# Patient Record
Sex: FEMALE | Race: WHITE | NOT HISPANIC OR LATINO | Employment: OTHER | URBAN - METROPOLITAN AREA
[De-identification: names, ages, dates, MRNs, and addresses within clinical notes are randomized per-mention and may not be internally consistent; named-entity substitution may affect disease eponyms.]

---

## 2017-02-08 ENCOUNTER — HOSPITAL ENCOUNTER (OUTPATIENT)
Dept: MAMMOGRAPHY | Facility: HOSPITAL | Age: 67
Discharge: HOME | End: 2017-02-08
Attending: FAMILY MEDICINE

## 2017-02-08 DIAGNOSIS — Z12.31 VISIT FOR SCREENING MAMMOGRAM: ICD-10-CM

## 2017-06-05 ENCOUNTER — RECORDS - HEALTHEAST (OUTPATIENT)
Dept: LAB | Facility: CLINIC | Age: 67
End: 2017-06-05

## 2017-06-05 LAB
CHOLEST SERPL-MCNC: 156 MG/DL
FASTING STATUS PATIENT QL REPORTED: YES
HDLC SERPL-MCNC: 38 MG/DL
LDLC SERPL CALC-MCNC: 76 MG/DL
TRIGL SERPL-MCNC: 210 MG/DL

## 2018-02-13 ENCOUNTER — RECORDS - HEALTHEAST (OUTPATIENT)
Dept: ADMINISTRATIVE | Facility: OTHER | Age: 68
End: 2018-02-13

## 2018-02-14 ENCOUNTER — HOSPITAL ENCOUNTER (OUTPATIENT)
Dept: MAMMOGRAPHY | Facility: HOSPITAL | Age: 68
Discharge: HOME | End: 2018-02-14
Attending: FAMILY MEDICINE

## 2018-02-14 DIAGNOSIS — Z12.31 VISIT FOR SCREENING MAMMOGRAM: ICD-10-CM

## 2018-02-21 ENCOUNTER — OFFICE VISIT - HEALTHEAST (OUTPATIENT)
Dept: SURGERY | Facility: CLINIC | Age: 68
End: 2018-02-21

## 2018-02-21 DIAGNOSIS — K43.9 VENTRAL HERNIA WITHOUT OBSTRUCTION OR GANGRENE: ICD-10-CM

## 2018-02-21 DIAGNOSIS — D25.9 UTERINE LEIOMYOMA, UNSPECIFIED LOCATION: ICD-10-CM

## 2018-02-21 RX ORDER — LISINOPRIL 10 MG/1
10 TABLET ORAL DAILY
Status: SHIPPED | COMMUNITY
Start: 2017-12-26 | End: 2024-05-23

## 2018-02-21 RX ORDER — BETAMETHASONE VALERATE 1.2 MG/G
1 CREAM TOPICAL 2 TIMES DAILY PRN
Status: SHIPPED | COMMUNITY
Start: 2017-12-26

## 2018-02-21 RX ORDER — PRAVASTATIN SODIUM 40 MG
40 TABLET ORAL AT BEDTIME
Status: SHIPPED | COMMUNITY
Start: 2024-05-12

## 2018-02-21 RX ORDER — OMEPRAZOLE 40 MG/1
40 CAPSULE, DELAYED RELEASE ORAL DAILY
Status: SHIPPED | COMMUNITY
Start: 2017-12-26

## 2018-02-21 RX ORDER — LEVOTHYROXINE SODIUM 150 UG/1
137 TABLET ORAL SEE ADMIN INSTRUCTIONS
Status: SHIPPED | COMMUNITY
Start: 2017-12-26 | End: 2024-05-15

## 2018-02-21 ASSESSMENT — MIFFLIN-ST. JEOR: SCORE: 1159.54

## 2018-03-09 ASSESSMENT — MIFFLIN-ST. JEOR: SCORE: 1167.92

## 2018-03-12 ENCOUNTER — ANESTHESIA - HEALTHEAST (OUTPATIENT)
Dept: SURGERY | Facility: HOSPITAL | Age: 68
End: 2018-03-12

## 2018-03-13 ENCOUNTER — SURGERY - HEALTHEAST (OUTPATIENT)
Dept: SURGERY | Facility: HOSPITAL | Age: 68
End: 2018-03-13

## 2018-03-13 ASSESSMENT — MIFFLIN-ST. JEOR: SCORE: 1155.16

## 2018-03-14 ASSESSMENT — MIFFLIN-ST. JEOR
SCORE: 1148.42
SCORE: 1154.31

## 2018-03-21 ENCOUNTER — OFFICE VISIT - HEALTHEAST (OUTPATIENT)
Dept: SURGERY | Facility: CLINIC | Age: 68
End: 2018-03-21

## 2018-03-21 DIAGNOSIS — K43.9 HERNIA, VENTRAL: ICD-10-CM

## 2018-06-08 ENCOUNTER — RECORDS - HEALTHEAST (OUTPATIENT)
Dept: LAB | Facility: CLINIC | Age: 68
End: 2018-06-08

## 2018-06-08 LAB
ALBUMIN SERPL-MCNC: 3.9 G/DL (ref 3.5–5)
ALP SERPL-CCNC: 96 U/L (ref 45–120)
ALT SERPL W P-5'-P-CCNC: 13 U/L (ref 0–45)
ANION GAP SERPL CALCULATED.3IONS-SCNC: 10 MMOL/L (ref 5–18)
AST SERPL W P-5'-P-CCNC: 16 U/L (ref 0–40)
BILIRUB SERPL-MCNC: 0.5 MG/DL (ref 0–1)
BUN SERPL-MCNC: 20 MG/DL (ref 8–22)
CALCIUM SERPL-MCNC: 9.7 MG/DL (ref 8.5–10.5)
CHLORIDE BLD-SCNC: 110 MMOL/L (ref 98–107)
CHOLEST SERPL-MCNC: 157 MG/DL
CO2 SERPL-SCNC: 21 MMOL/L (ref 22–31)
CREAT SERPL-MCNC: 0.82 MG/DL (ref 0.6–1.1)
FASTING STATUS PATIENT QL REPORTED: ABNORMAL
GFR SERPL CREATININE-BSD FRML MDRD: >60 ML/MIN/1.73M2
GLUCOSE BLD-MCNC: 94 MG/DL (ref 70–125)
HDLC SERPL-MCNC: 41 MG/DL
LDLC SERPL CALC-MCNC: 90 MG/DL
POTASSIUM BLD-SCNC: 4.4 MMOL/L (ref 3.5–5)
PROT SERPL-MCNC: 6.6 G/DL (ref 6–8)
SODIUM SERPL-SCNC: 141 MMOL/L (ref 136–145)
TRIGL SERPL-MCNC: 130 MG/DL
TSH SERPL DL<=0.005 MIU/L-ACNC: 0.18 UIU/ML (ref 0.3–5)

## 2018-10-30 ENCOUNTER — RECORDS - HEALTHEAST (OUTPATIENT)
Dept: LAB | Facility: CLINIC | Age: 68
End: 2018-10-30

## 2018-10-30 LAB
ANION GAP SERPL CALCULATED.3IONS-SCNC: 10 MMOL/L (ref 5–18)
BUN SERPL-MCNC: 19 MG/DL (ref 8–22)
CALCIUM SERPL-MCNC: 10 MG/DL (ref 8.5–10.5)
CHLORIDE BLD-SCNC: 108 MMOL/L (ref 98–107)
CO2 SERPL-SCNC: 22 MMOL/L (ref 22–31)
CREAT SERPL-MCNC: 0.86 MG/DL (ref 0.6–1.1)
ERYTHROCYTE [DISTWIDTH] IN BLOOD BY AUTOMATED COUNT: 13.1 % (ref 11–14.5)
GFR SERPL CREATININE-BSD FRML MDRD: >60 ML/MIN/1.73M2
GLUCOSE BLD-MCNC: 94 MG/DL (ref 70–125)
HCT VFR BLD AUTO: 40.4 % (ref 35–47)
HGB BLD-MCNC: 12.9 G/DL (ref 12–16)
MCH RBC QN AUTO: 28.2 PG (ref 27–34)
MCHC RBC AUTO-ENTMCNC: 31.9 G/DL (ref 32–36)
MCV RBC AUTO: 88 FL (ref 80–100)
PLATELET # BLD AUTO: 281 THOU/UL (ref 140–440)
PMV BLD AUTO: 10 FL (ref 8.5–12.5)
POTASSIUM BLD-SCNC: 4.5 MMOL/L (ref 3.5–5)
RBC # BLD AUTO: 4.57 MILL/UL (ref 3.8–5.4)
SODIUM SERPL-SCNC: 140 MMOL/L (ref 136–145)
WBC: 9.6 THOU/UL (ref 4–11)

## 2019-02-06 ENCOUNTER — OFFICE VISIT - HEALTHEAST (OUTPATIENT)
Dept: SURGERY | Facility: CLINIC | Age: 69
End: 2019-02-06

## 2019-02-06 DIAGNOSIS — R10.84 ABDOMINAL PAIN, GENERALIZED: ICD-10-CM

## 2019-02-12 ENCOUNTER — RECORDS - HEALTHEAST (OUTPATIENT)
Dept: LAB | Facility: CLINIC | Age: 69
End: 2019-02-12

## 2019-02-12 LAB
TSH SERPL DL<=0.005 MIU/L-ACNC: 0.07 UIU/ML (ref 0.3–5)
VIT B12 SERPL-MCNC: 968 PG/ML (ref 213–816)

## 2019-02-22 ENCOUNTER — HOSPITAL ENCOUNTER (OUTPATIENT)
Dept: MAMMOGRAPHY | Facility: CLINIC | Age: 69
Discharge: HOME | End: 2019-02-22
Attending: FAMILY MEDICINE

## 2019-02-22 DIAGNOSIS — Z12.31 VISIT FOR SCREENING MAMMOGRAM: ICD-10-CM

## 2019-04-19 ENCOUNTER — RECORDS - HEALTHEAST (OUTPATIENT)
Dept: LAB | Facility: CLINIC | Age: 69
End: 2019-04-19

## 2019-04-19 LAB
ANION GAP SERPL CALCULATED.3IONS-SCNC: 9 MMOL/L (ref 5–18)
BUN SERPL-MCNC: 18 MG/DL (ref 8–22)
CALCIUM SERPL-MCNC: 10.1 MG/DL (ref 8.5–10.5)
CHLORIDE BLD-SCNC: 107 MMOL/L (ref 98–107)
CO2 SERPL-SCNC: 26 MMOL/L (ref 22–31)
CREAT SERPL-MCNC: 0.95 MG/DL (ref 0.6–1.1)
ERYTHROCYTE [DISTWIDTH] IN BLOOD BY AUTOMATED COUNT: 13.6 % (ref 11–14.5)
GFR SERPL CREATININE-BSD FRML MDRD: 58 ML/MIN/1.73M2
GLUCOSE BLD-MCNC: 93 MG/DL (ref 70–125)
HCT VFR BLD AUTO: 41.5 % (ref 35–47)
HGB BLD-MCNC: 13.2 G/DL (ref 12–16)
MCH RBC QN AUTO: 27.8 PG (ref 27–34)
MCHC RBC AUTO-ENTMCNC: 31.8 G/DL (ref 32–36)
MCV RBC AUTO: 88 FL (ref 80–100)
PLATELET # BLD AUTO: 280 THOU/UL (ref 140–440)
PMV BLD AUTO: 9.9 FL (ref 8.5–12.5)
POTASSIUM BLD-SCNC: 4.9 MMOL/L (ref 3.5–5)
RBC # BLD AUTO: 4.74 MILL/UL (ref 3.8–5.4)
SODIUM SERPL-SCNC: 142 MMOL/L (ref 136–145)
WBC: 6.4 THOU/UL (ref 4–11)

## 2019-05-29 ENCOUNTER — RECORDS - HEALTHEAST (OUTPATIENT)
Dept: LAB | Facility: CLINIC | Age: 69
End: 2019-05-29

## 2019-05-31 LAB — BACTERIA SPEC CULT: NORMAL

## 2019-08-09 ENCOUNTER — RECORDS - HEALTHEAST (OUTPATIENT)
Dept: LAB | Facility: CLINIC | Age: 69
End: 2019-08-09

## 2019-08-09 LAB
CHOLEST SERPL-MCNC: 161 MG/DL
FASTING STATUS PATIENT QL REPORTED: NORMAL
HDLC SERPL-MCNC: 50 MG/DL
LDLC SERPL CALC-MCNC: 83 MG/DL
TRIGL SERPL-MCNC: 140 MG/DL
TSH SERPL DL<=0.005 MIU/L-ACNC: 0.16 UIU/ML (ref 0.3–5)

## 2019-08-12 LAB — 25(OH)D3 SERPL-MCNC: 56.8 NG/ML (ref 30–80)

## 2020-03-13 ENCOUNTER — HOSPITAL ENCOUNTER (OUTPATIENT)
Dept: MAMMOGRAPHY | Facility: CLINIC | Age: 70
Discharge: HOME | End: 2020-03-13
Attending: FAMILY MEDICINE

## 2020-03-13 DIAGNOSIS — Z12.31 VISIT FOR SCREENING MAMMOGRAM: ICD-10-CM

## 2020-03-16 ENCOUNTER — RECORDS - HEALTHEAST (OUTPATIENT)
Dept: ADMINISTRATIVE | Facility: OTHER | Age: 70
End: 2020-03-16

## 2020-03-20 ENCOUNTER — HOSPITAL ENCOUNTER (OUTPATIENT)
Dept: MAMMOGRAPHY | Facility: CLINIC | Age: 70
Discharge: HOME | End: 2020-03-20
Attending: FAMILY MEDICINE

## 2020-03-20 DIAGNOSIS — N64.89 BREAST ASYMMETRY: ICD-10-CM

## 2020-11-06 ENCOUNTER — RECORDS - HEALTHEAST (OUTPATIENT)
Dept: LAB | Facility: CLINIC | Age: 70
End: 2020-11-06

## 2020-11-06 LAB
ALBUMIN SERPL-MCNC: 3.9 G/DL (ref 3.5–5)
ALP SERPL-CCNC: 94 U/L (ref 45–120)
ALT SERPL W P-5'-P-CCNC: 22 U/L (ref 0–45)
ANION GAP SERPL CALCULATED.3IONS-SCNC: 11 MMOL/L (ref 5–18)
AST SERPL W P-5'-P-CCNC: 22 U/L (ref 0–40)
BILIRUB SERPL-MCNC: 0.4 MG/DL (ref 0–1)
BUN SERPL-MCNC: 22 MG/DL (ref 8–28)
CALCIUM SERPL-MCNC: 9.2 MG/DL (ref 8.5–10.5)
CHLORIDE BLD-SCNC: 108 MMOL/L (ref 98–107)
CHOLEST SERPL-MCNC: 173 MG/DL
CO2 SERPL-SCNC: 21 MMOL/L (ref 22–31)
CREAT SERPL-MCNC: 0.98 MG/DL (ref 0.6–1.1)
FASTING STATUS PATIENT QL REPORTED: ABNORMAL
GFR SERPL CREATININE-BSD FRML MDRD: 56 ML/MIN/1.73M2
GLUCOSE BLD-MCNC: 86 MG/DL (ref 70–125)
HDLC SERPL-MCNC: 46 MG/DL
LDLC SERPL CALC-MCNC: 97 MG/DL
POTASSIUM BLD-SCNC: 4.7 MMOL/L (ref 3.5–5)
PROT SERPL-MCNC: 6.4 G/DL (ref 6–8)
SODIUM SERPL-SCNC: 140 MMOL/L (ref 136–145)
TRIGL SERPL-MCNC: 148 MG/DL
TSH SERPL DL<=0.005 MIU/L-ACNC: 0.09 UIU/ML (ref 0.3–5)

## 2020-11-09 LAB — 25(OH)D3 SERPL-MCNC: 40.3 NG/ML (ref 30–80)

## 2020-12-28 ENCOUNTER — RECORDS - HEALTHEAST (OUTPATIENT)
Dept: LAB | Facility: CLINIC | Age: 70
End: 2020-12-28

## 2020-12-29 LAB — BACTERIA SPEC CULT: NO GROWTH

## 2021-05-06 ENCOUNTER — RECORDS - HEALTHEAST (OUTPATIENT)
Dept: LAB | Facility: CLINIC | Age: 71
End: 2021-05-06

## 2021-05-06 LAB — TSH SERPL DL<=0.005 MIU/L-ACNC: 0.35 UIU/ML (ref 0.3–5)

## 2021-05-07 ENCOUNTER — HOSPITAL ENCOUNTER (OUTPATIENT)
Dept: MAMMOGRAPHY | Facility: CLINIC | Age: 71
Discharge: HOME | End: 2021-05-07
Attending: FAMILY MEDICINE

## 2021-05-07 DIAGNOSIS — Z12.31 VISIT FOR SCREENING MAMMOGRAM: ICD-10-CM

## 2021-05-24 ENCOUNTER — RECORDS - HEALTHEAST (OUTPATIENT)
Dept: ADMINISTRATIVE | Facility: CLINIC | Age: 71
End: 2021-05-24

## 2021-05-25 ENCOUNTER — RECORDS - HEALTHEAST (OUTPATIENT)
Dept: ADMINISTRATIVE | Facility: CLINIC | Age: 71
End: 2021-05-25

## 2021-05-26 ENCOUNTER — RECORDS - HEALTHEAST (OUTPATIENT)
Dept: ADMINISTRATIVE | Facility: CLINIC | Age: 71
End: 2021-05-26

## 2021-05-28 ENCOUNTER — RECORDS - HEALTHEAST (OUTPATIENT)
Dept: ADMINISTRATIVE | Facility: CLINIC | Age: 71
End: 2021-05-28

## 2021-05-29 ENCOUNTER — RECORDS - HEALTHEAST (OUTPATIENT)
Dept: ADMINISTRATIVE | Facility: CLINIC | Age: 71
End: 2021-05-29

## 2021-05-30 ENCOUNTER — RECORDS - HEALTHEAST (OUTPATIENT)
Dept: ADMINISTRATIVE | Facility: CLINIC | Age: 71
End: 2021-05-30

## 2021-06-01 ENCOUNTER — RECORDS - HEALTHEAST (OUTPATIENT)
Dept: ADMINISTRATIVE | Facility: CLINIC | Age: 71
End: 2021-06-01

## 2021-06-01 VITALS — HEIGHT: 62 IN | BODY MASS INDEX: 27.92 KG/M2 | WEIGHT: 151.7 LBS

## 2021-06-01 VITALS — WEIGHT: 155.9 LBS | BODY MASS INDEX: 29.43 KG/M2 | HEIGHT: 61 IN

## 2021-06-16 PROBLEM — D25.9 FIBROID UTERUS: Status: ACTIVE | Noted: 2018-03-13

## 2021-06-16 NOTE — PROGRESS NOTES
Hernia education & surgery packet provided to pt.    Nori Navas CMA ([facility])     Ph: [phone]    Fx: [phone]

## 2021-06-16 NOTE — PROGRESS NOTES
HPI: Pt is here for follow up of a CLAIRE with Ventral hernia repair..   she is doing well.  Pain is well controlled.  No difficulties with the surgical wound/wounds.  she is eating well and denies fever and chills.         /81 (Patient Site: Right Arm, Patient Position: Sitting, Cuff Size: Adult Regular)  Pulse 85  SpO2 96%  Breastfeeding? No    EXAM:  GENERAL:Appears well  ABDOMEN:  Soft, +BS  SURGICAL WOUNDS:  Incisions healing well, no enduration or drainage.  Staples were removed in clinic today.      Assessment/Plan:   Doing well after surgery.  Her staples were removed and should follow up as needed.      Josue Love MD  [facility] Department of Surgery

## 2021-06-16 NOTE — PROGRESS NOTES
"GENERAL SURGICAL CONSULTATION    I was requested by Dr. Zamarripa, to consult on this pt to evaluate them for recurrent ventral hernia    HPI:  This is a 68 y.o. female here today with recurrent ventral hernia.  This patient had an open cholecystectomy for which she developed a ventral hernia.  She has had at least 2 ventral hernia repairs done in the past.  The patient has now been assessed by Dr. Zamarripa for an expansive uterine fibroid.  Dr. Zamarripa is planning an open hysterectomy and has requested surgical input because the patient has evidence for a recurrent ventral hernia.  The patient's not critically symptomatic with the hernia defect.  She does have deformity of the abdominal wall.  She denies troubles with nausea vomiting or weight loss.    Allergies:Penicillins    History reviewed. No pertinent past medical history.    Past surgical history:  Open cholecystectomy  Ventral hernias ×2 the last one was laparoscopic with mesh.    CURRENT MEDS:  Current Outpatient Prescriptions   Medication Sig Dispense Refill     betamethasone valerate (VALISONE) 0.1 % cream        levothyroxine (SYNTHROID, LEVOTHROID) 150 MCG tablet        lisinopril (PRINIVIL,ZESTRIL) 10 MG tablet        omeprazole (PRILOSEC) 40 MG capsule        pravastatin (PRAVACHOL) 40 MG tablet        No current facility-administered medications for this visit.        Family History   Problem Relation Age of Onset     Cancer Father 76     brain     Family history is not pertinent to this patients Chief Complaint.     reports that she has quit smoking. She has never used smokeless tobacco. She reports that she drinks alcohol. She reports that she does not use illicit drugs.    Review of Systems -   10 point Review of systems is negative except for; as mentioned above in HPI and PMHx    BP (!) 152/95 (Patient Site: Right Arm, Patient Position: Sitting, Cuff Size: Adult Regular)  Pulse 89  Ht 5' 1\" (1.549 m)  Wt 155 lb 14.4 oz (70.7 kg)  SpO2 95%  " Breastfeeding? No  BMI 29.46 kg/m2  Body mass index is 29.46 kg/(m^2).    EXAM:  GENERAL: Well developed female  HEENT: EOMI, Anicteric Sclera, Moist Mucous Membranes,  In Mouth the pt does not have redness or bleeding gums  CARDIOVASCULAR: RRR w/out murmur   CHEST/LUNG: Clear to Auscultation  ABDOMEN:  Non tender to palpation, +BS, the abdominal wall is deformed she has protuberance of the left side of the abdominal wall.  She has a herniation which is protruding out in the left periumbilical region.  At least some of that hernia content is reducible.  This area is not particularly tender to palpation, however the patient does say she is uncomfortable as I try to reduce the hernia.  MUSCULOSKELETAL:  No deformities with good range of motion in all extremities  NEURO: She is ambulatory with good strength in both legs.  HEME/LYMPH: No Cervical Adenopathy or tenderness.     IMAGES:  I evaluated these images myself and it shows an enlarged uterus along with the ventral hernia.    Assessment/Plan:  68-year-old patient with large fibroid which will require an open hysterectomy in addition that the patient has a ventral hernia and has had prior hernia repairs.  I think it is appropriate the general surgery works as an assistant to help with a hysterectomy because the patient will likely have anterior abdominal wall adhesions which may need to be negotiated and exposing the uterus for the hysterectomy.  Following the hysterectomy will need to do some abdominal wall reconstruction to treat the ventral hernia.  If there is no contamination with the surgery will likely use mesh if there is any contamination we may just need to do a primary abdominal wall closure and deal with any hernia recurrence as if they come up.    Thank Dr. Zamarripa for this consultation I look forward to working with you in the care of this very pleasant patient      Josue Love MD  [facility]  [phone]

## 2021-06-16 NOTE — ANESTHESIA CARE TRANSFER NOTE
Last vitals:   Vitals:    03/13/18 1013   BP: 133/67   Pulse: 96   Resp: 20   Temp: 36.5  C (97.7  F)   SpO2: 96%     Patient's level of consciousness is awake  Spontaneous respirations: yes  Maintains airway independently: yes  Dentition unchanged: yes  Oropharynx: oropharynx clear of all foreign objects    QCDR Measures:  ASA# 20 - Surgical Safety Checklist: WHO surgical safety checklist completed prior to induction  PQRS# 430 - Adult PONV Prevention: 4558F - Pt received => 2 anti-emetic agents (different classes) preop & intraop  ASA# 8 - Peds PONV Prevention: NA - Not pediatric patient, not GA or 2 or more risk factors NOT present  PQRS# 424 - Dipika-op Temp Management: 4559F - At least one body temp DOCUMENTED => 35.5C or 95.9F within required timeframe  PQRS# 426 - PACU Transfer Protocol: - Transfer of care checklist used  ASA# 14 - Acute Post-op Pain: ASA14A - Patient experienced pain >= 7 out of 10   Transferred to pacu w O2 via mask 8 L/min. VSS

## 2021-06-16 NOTE — ANESTHESIA PREPROCEDURE EVALUATION
Anesthesia Evaluation      Patient summary reviewed   History of anesthetic complications (PONV)     Airway   Mallampati: II  Neck ROM: limited   Pulmonary - normal exam                          Cardiovascular - normal exam  (+) hypertension, ,     ECG reviewed  Rhythm: regular  Rate: normal,         Neuro/Psych    (+) depression,     Endo/Other    (+) hypothyroidism,      GI/Hepatic/Renal    (+) GERD,             Dental - normal exam                 Chemistry        Component Value Date/Time     06/05/2017 0845    K 4.3 06/05/2017 0845     (H) 06/05/2017 0845    CO2 22 06/05/2017 0845    BUN 15 06/05/2017 0845    CREATININE 0.86 06/05/2017 0845     06/05/2017 0845        Component Value Date/Time    CALCIUM 9.5 06/05/2017 0845    ALKPHOS 91 06/05/2017 0845    AST 17 06/05/2017 0845    ALT 20 06/05/2017 0845    BILITOT 0.4 06/05/2017 0845        Lab Results   Component Value Date    WBC 7.9 02/19/2016    HGB 14.1 03/12/2018    HCT 40.3 02/19/2016    MCV 88 02/19/2016     02/19/2016                Anesthesia Plan  Planned anesthetic: general endotracheal  Background diprivan    ASA 2   Induction: intravenous   Anesthetic plan and risks discussed with: patient  Anesthesia plan special considerations: antiemetics,   Post-op plan: routine recovery

## 2021-06-16 NOTE — ANESTHESIA POSTPROCEDURE EVALUATION
Patient: Mary Martino  EXPLORATORY LAPAROTOMY TOTAL ABDOMINAL HYSTERECTOMY, BILATERAL SALPINGO OOPHORECTOMY PELVIC WASHINGS LYSIS OF ADHESIONS, VENTRAL HERNIA REPAIR  Anesthesia type: general    Patient location: PACU  Last vitals:   Vitals:    03/13/18 1030   BP: 98/57   Pulse: 81   Resp: 13   Temp:    SpO2: 94%     Post vital signs: stable  Level of consciousness: awake and responds to simple questions  Post-anesthesia pain: pain controlled  Post-anesthesia nausea and vomiting: nausea being treated  Pulmonary: unassisted, return to baseline  Cardiovascular: stable and blood pressure at baseline  Hydration: adequate  Anesthetic events: no     QCDR Measures:  ASA# 11 - Dipika-op Cardiac Arrest: ASA11B - Patient did NOT experience unanticipated cardiac arrest  ASA# 12 - Dipika-op Mortality Rate: ASA12B - Patient did NOT die  ASA# 13 - PACU Re-Intubation Rate: ASA13B - Patient did NOT require a new airway mgmt  ASA# 10 - Composite Anes Safety: ASA10A - No serious adverse event    Additional Notes:

## 2021-07-13 ENCOUNTER — RECORDS - HEALTHEAST (OUTPATIENT)
Dept: ADMINISTRATIVE | Facility: CLINIC | Age: 71
End: 2021-07-13

## 2021-07-21 ENCOUNTER — RECORDS - HEALTHEAST (OUTPATIENT)
Dept: ADMINISTRATIVE | Facility: CLINIC | Age: 71
End: 2021-07-21

## 2022-02-07 ENCOUNTER — LAB REQUISITION (OUTPATIENT)
Dept: LAB | Facility: CLINIC | Age: 72
End: 2022-02-07
Payer: COMMERCIAL

## 2022-02-07 DIAGNOSIS — Z00.00 ENCOUNTER FOR GENERAL ADULT MEDICAL EXAMINATION WITHOUT ABNORMAL FINDINGS: ICD-10-CM

## 2022-02-07 DIAGNOSIS — E78.5 HYPERLIPIDEMIA, UNSPECIFIED: ICD-10-CM

## 2022-02-07 LAB
ALBUMIN SERPL-MCNC: 3.8 G/DL (ref 3.5–5)
ALP SERPL-CCNC: 85 U/L (ref 45–120)
ALT SERPL W P-5'-P-CCNC: 31 U/L (ref 0–45)
ANION GAP SERPL CALCULATED.3IONS-SCNC: 10 MMOL/L (ref 5–18)
AST SERPL W P-5'-P-CCNC: 28 U/L (ref 0–40)
BILIRUB SERPL-MCNC: 0.5 MG/DL (ref 0–1)
BUN SERPL-MCNC: 20 MG/DL (ref 8–28)
CALCIUM SERPL-MCNC: 9.6 MG/DL (ref 8.5–10.5)
CHLORIDE BLD-SCNC: 108 MMOL/L (ref 98–107)
CHOLEST SERPL-MCNC: 166 MG/DL
CO2 SERPL-SCNC: 23 MMOL/L (ref 22–31)
CREAT SERPL-MCNC: 1.1 MG/DL (ref 0.6–1.1)
FASTING STATUS PATIENT QL REPORTED: NORMAL
GFR SERPL CREATININE-BSD FRML MDRD: 53 ML/MIN/1.73M2
GLUCOSE BLD-MCNC: 92 MG/DL (ref 70–125)
HDLC SERPL-MCNC: 50 MG/DL
LDLC SERPL CALC-MCNC: 96 MG/DL
POTASSIUM BLD-SCNC: 4.6 MMOL/L (ref 3.5–5)
PROT SERPL-MCNC: 6.3 G/DL (ref 6–8)
SODIUM SERPL-SCNC: 141 MMOL/L (ref 136–145)
TRIGL SERPL-MCNC: 102 MG/DL

## 2022-02-07 PROCEDURE — 82306 VITAMIN D 25 HYDROXY: CPT | Mod: ORL | Performed by: FAMILY MEDICINE

## 2022-02-07 PROCEDURE — 80061 LIPID PANEL: CPT | Mod: ORL | Performed by: FAMILY MEDICINE

## 2022-02-07 PROCEDURE — 80053 COMPREHEN METABOLIC PANEL: CPT | Mod: ORL | Performed by: FAMILY MEDICINE

## 2022-02-08 LAB — DEPRECATED CALCIDIOL+CALCIFEROL SERPL-MC: 47 UG/L (ref 30–80)

## 2022-06-14 ENCOUNTER — LAB REQUISITION (OUTPATIENT)
Dept: LAB | Facility: CLINIC | Age: 72
End: 2022-06-14
Payer: COMMERCIAL

## 2022-06-14 DIAGNOSIS — E03.9 HYPOTHYROIDISM, UNSPECIFIED: ICD-10-CM

## 2022-06-14 DIAGNOSIS — R20.2 PARESTHESIA OF SKIN: ICD-10-CM

## 2022-06-14 LAB
TSH SERPL DL<=0.005 MIU/L-ACNC: 2.54 UIU/ML (ref 0.3–5)
VIT B12 SERPL-MCNC: 439 PG/ML (ref 213–816)

## 2022-06-14 PROCEDURE — 82607 VITAMIN B-12: CPT | Mod: ORL | Performed by: FAMILY MEDICINE

## 2022-06-14 PROCEDURE — 84443 ASSAY THYROID STIM HORMONE: CPT | Mod: ORL | Performed by: FAMILY MEDICINE

## 2022-07-06 ENCOUNTER — ANCILLARY PROCEDURE (OUTPATIENT)
Dept: MAMMOGRAPHY | Facility: CLINIC | Age: 72
End: 2022-07-06
Attending: FAMILY MEDICINE
Payer: COMMERCIAL

## 2022-07-06 DIAGNOSIS — Z12.31 VISIT FOR SCREENING MAMMOGRAM: ICD-10-CM

## 2022-07-06 PROCEDURE — 77067 SCR MAMMO BI INCL CAD: CPT

## 2022-12-05 ENCOUNTER — HOSPITAL ENCOUNTER (OUTPATIENT)
Facility: AMBULATORY SURGERY CENTER | Age: 72
End: 2022-12-05
Attending: COLON & RECTAL SURGERY
Payer: COMMERCIAL

## 2023-02-20 ENCOUNTER — LAB REQUISITION (OUTPATIENT)
Dept: LAB | Facility: CLINIC | Age: 73
End: 2023-02-20
Payer: COMMERCIAL

## 2023-02-20 DIAGNOSIS — N18.31 CHRONIC KIDNEY DISEASE, STAGE 3A (H): ICD-10-CM

## 2023-02-20 DIAGNOSIS — E78.5 HYPERLIPIDEMIA, UNSPECIFIED: ICD-10-CM

## 2023-02-20 DIAGNOSIS — E55.9 VITAMIN D DEFICIENCY, UNSPECIFIED: ICD-10-CM

## 2023-02-20 DIAGNOSIS — E53.8 DEFICIENCY OF OTHER SPECIFIED B GROUP VITAMINS: ICD-10-CM

## 2023-02-20 LAB
ALBUMIN SERPL BCG-MCNC: 4 G/DL (ref 3.5–5.2)
ALP SERPL-CCNC: 83 U/L (ref 35–104)
ALT SERPL W P-5'-P-CCNC: 18 U/L (ref 10–35)
ANION GAP SERPL CALCULATED.3IONS-SCNC: 12 MMOL/L (ref 7–15)
AST SERPL W P-5'-P-CCNC: 22 U/L (ref 10–35)
BILIRUB SERPL-MCNC: 0.4 MG/DL
BUN SERPL-MCNC: 16 MG/DL (ref 8–23)
CALCIUM SERPL-MCNC: 9.4 MG/DL (ref 8.8–10.2)
CHLORIDE SERPL-SCNC: 108 MMOL/L (ref 98–107)
CHOLEST SERPL-MCNC: 165 MG/DL
CREAT SERPL-MCNC: 0.99 MG/DL (ref 0.51–0.95)
DEPRECATED CALCIDIOL+CALCIFEROL SERPL-MC: 51 UG/L (ref 20–75)
DEPRECATED HCO3 PLAS-SCNC: 23 MMOL/L (ref 22–29)
GFR SERPL CREATININE-BSD FRML MDRD: 60 ML/MIN/1.73M2
GLUCOSE SERPL-MCNC: 90 MG/DL (ref 70–99)
HDLC SERPL-MCNC: 47 MG/DL
LDLC SERPL CALC-MCNC: 90 MG/DL
NONHDLC SERPL-MCNC: 118 MG/DL
POTASSIUM SERPL-SCNC: 4.1 MMOL/L (ref 3.4–5.3)
PROT SERPL-MCNC: 6.1 G/DL (ref 6.4–8.3)
SODIUM SERPL-SCNC: 143 MMOL/L (ref 136–145)
TRIGL SERPL-MCNC: 138 MG/DL
VIT B12 SERPL-MCNC: 571 PG/ML (ref 232–1245)

## 2023-02-20 PROCEDURE — 80053 COMPREHEN METABOLIC PANEL: CPT | Mod: ORL | Performed by: FAMILY MEDICINE

## 2023-02-20 PROCEDURE — 82306 VITAMIN D 25 HYDROXY: CPT | Mod: ORL | Performed by: FAMILY MEDICINE

## 2023-02-20 PROCEDURE — 80061 LIPID PANEL: CPT | Mod: ORL | Performed by: FAMILY MEDICINE

## 2023-02-20 PROCEDURE — 82607 VITAMIN B-12: CPT | Mod: ORL | Performed by: FAMILY MEDICINE

## 2023-06-16 ENCOUNTER — HOSPITAL ENCOUNTER (EMERGENCY)
Facility: CLINIC | Age: 73
Discharge: HOME | End: 2023-06-16
Attending: FAMILY MEDICINE | Admitting: FAMILY MEDICINE
Payer: COMMERCIAL

## 2023-06-16 ENCOUNTER — APPOINTMENT (OUTPATIENT)
Dept: CT IMAGING | Facility: CLINIC | Age: 73
End: 2023-06-16
Attending: FAMILY MEDICINE
Payer: COMMERCIAL

## 2023-06-16 VITALS
HEART RATE: 80 BPM | BODY MASS INDEX: 27.56 KG/M2 | TEMPERATURE: 97.2 F | SYSTOLIC BLOOD PRESSURE: 123 MMHG | OXYGEN SATURATION: 95 % | WEIGHT: 146 LBS | HEIGHT: 61 IN | RESPIRATION RATE: 18 BRPM | DIASTOLIC BLOOD PRESSURE: 69 MMHG

## 2023-06-16 DIAGNOSIS — K43.9 VENTRAL HERNIA WITHOUT OBSTRUCTION OR GANGRENE: ICD-10-CM

## 2023-06-16 DIAGNOSIS — R10.33 PERIUMBILICAL ABDOMINAL PAIN: ICD-10-CM

## 2023-06-16 DIAGNOSIS — K85.90 ACUTE PANCREATITIS WITHOUT INFECTION OR NECROSIS, UNSPECIFIED PANCREATITIS TYPE: ICD-10-CM

## 2023-06-16 LAB
ALBUMIN SERPL BCG-MCNC: 3.8 G/DL (ref 3.5–5.2)
ALP SERPL-CCNC: 84 U/L (ref 35–104)
ALT SERPL W P-5'-P-CCNC: 16 U/L (ref 0–50)
ANION GAP SERPL CALCULATED.3IONS-SCNC: 14 MMOL/L (ref 7–15)
AST SERPL W P-5'-P-CCNC: 24 U/L (ref 0–45)
BASOPHILS # BLD AUTO: 0.1 10E3/UL (ref 0–0.2)
BASOPHILS NFR BLD AUTO: 1 %
BILIRUB SERPL-MCNC: 0.3 MG/DL
BUN SERPL-MCNC: 21.8 MG/DL (ref 8–23)
CALCIUM SERPL-MCNC: 10 MG/DL (ref 8.8–10.2)
CHLORIDE SERPL-SCNC: 94 MMOL/L (ref 98–107)
CREAT SERPL-MCNC: 1.24 MG/DL (ref 0.51–0.95)
DEPRECATED HCO3 PLAS-SCNC: 23 MMOL/L (ref 22–29)
EOSINOPHIL # BLD AUTO: 0.3 10E3/UL (ref 0–0.7)
EOSINOPHIL NFR BLD AUTO: 3 %
ERYTHROCYTE [DISTWIDTH] IN BLOOD BY AUTOMATED COUNT: 12.9 % (ref 10–15)
GFR SERPL CREATININE-BSD FRML MDRD: 46 ML/MIN/1.73M2
GLUCOSE SERPL-MCNC: 100 MG/DL (ref 70–99)
HCT VFR BLD AUTO: 37.1 % (ref 35–47)
HGB BLD-MCNC: 13 G/DL (ref 11.7–15.7)
IMM GRANULOCYTES # BLD: 0 10E3/UL
IMM GRANULOCYTES NFR BLD: 0 %
LIPASE SERPL-CCNC: 147 U/L (ref 13–60)
LYMPHOCYTES # BLD AUTO: 1.1 10E3/UL (ref 0.8–5.3)
LYMPHOCYTES NFR BLD AUTO: 14 %
MCH RBC QN AUTO: 28.8 PG (ref 26.5–33)
MCHC RBC AUTO-ENTMCNC: 35 G/DL (ref 31.5–36.5)
MCV RBC AUTO: 82 FL (ref 78–100)
MONOCYTES # BLD AUTO: 0.8 10E3/UL (ref 0–1.3)
MONOCYTES NFR BLD AUTO: 11 %
NEUTROPHILS # BLD AUTO: 5.5 10E3/UL (ref 1.6–8.3)
NEUTROPHILS NFR BLD AUTO: 71 %
NRBC # BLD AUTO: 0 10E3/UL
NRBC BLD AUTO-RTO: 0 /100
PLATELET # BLD AUTO: 274 10E3/UL (ref 150–450)
POTASSIUM SERPL-SCNC: 3.6 MMOL/L (ref 3.4–5.3)
PROT SERPL-MCNC: 6.5 G/DL (ref 6.4–8.3)
RBC # BLD AUTO: 4.51 10E6/UL (ref 3.8–5.2)
SODIUM SERPL-SCNC: 131 MMOL/L (ref 136–145)
WBC # BLD AUTO: 7.7 10E3/UL (ref 4–11)

## 2023-06-16 PROCEDURE — 74177 CT ABD & PELVIS W/CONTRAST: CPT

## 2023-06-16 PROCEDURE — 258N000003 HC RX IP 258 OP 636: Performed by: FAMILY MEDICINE

## 2023-06-16 PROCEDURE — 96360 HYDRATION IV INFUSION INIT: CPT | Mod: 59 | Performed by: FAMILY MEDICINE

## 2023-06-16 PROCEDURE — 99285 EMERGENCY DEPT VISIT HI MDM: CPT | Mod: 25 | Performed by: FAMILY MEDICINE

## 2023-06-16 PROCEDURE — 85025 COMPLETE CBC W/AUTO DIFF WBC: CPT | Performed by: FAMILY MEDICINE

## 2023-06-16 PROCEDURE — 99284 EMERGENCY DEPT VISIT MOD MDM: CPT | Performed by: FAMILY MEDICINE

## 2023-06-16 PROCEDURE — 96361 HYDRATE IV INFUSION ADD-ON: CPT | Performed by: FAMILY MEDICINE

## 2023-06-16 PROCEDURE — 83690 ASSAY OF LIPASE: CPT | Performed by: FAMILY MEDICINE

## 2023-06-16 PROCEDURE — 250N000011 HC RX IP 250 OP 636: Performed by: FAMILY MEDICINE

## 2023-06-16 PROCEDURE — 250N000009 HC RX 250: Performed by: FAMILY MEDICINE

## 2023-06-16 PROCEDURE — 36415 COLL VENOUS BLD VENIPUNCTURE: CPT | Performed by: FAMILY MEDICINE

## 2023-06-16 PROCEDURE — 80053 COMPREHEN METABOLIC PANEL: CPT | Performed by: FAMILY MEDICINE

## 2023-06-16 RX ORDER — IOPAMIDOL 755 MG/ML
64 INJECTION, SOLUTION INTRAVASCULAR ONCE
Status: COMPLETED | OUTPATIENT
Start: 2023-06-16 | End: 2023-06-16

## 2023-06-16 RX ADMIN — SODIUM CHLORIDE 1000 ML: 9 INJECTION, SOLUTION INTRAVENOUS at 13:27

## 2023-06-16 RX ADMIN — SODIUM CHLORIDE 58 ML: 9 INJECTION, SOLUTION INTRAVENOUS at 12:58

## 2023-06-16 RX ADMIN — IOPAMIDOL 64 ML: 755 INJECTION, SOLUTION INTRAVENOUS at 12:59

## 2023-06-16 ASSESSMENT — ACTIVITIES OF DAILY LIVING (ADL)
ADLS_ACUITY_SCORE: 35
ADLS_ACUITY_SCORE: 35

## 2023-06-16 NOTE — ED PROVIDER NOTES
History     Chief Complaint   Patient presents with     Abdominal Pain     Pt reports she had a colonoscopy on last Wednesday, pt reports periumbilical pain since. Pt reports they had a difficult time with the colonoscopy due to scar tissue and pt needs to have further testing done. Pt reports hx of hernia repairs in the past. Pt reports pain 7/10, pt denies nausea, vomiting and fevers at this time.      HPI  Mary Martino is a 73 year old female, past medical history is significant for uterine fibroids, ventral hernia, depression, GERD, hyperlipidemia, hypertension, hypothyroidism, seasonal allergies, presents to the emergency department with concerns of abdominal pain, acute on chronic, per the patient.  The patient states that she always has some discomfort around the site where she has had ventral hernia repair x4 the last in 2018, but since a failed attempt at colonoscopy with [facility] in [place] on 6/14/2023 she has had increased discomfort.  The patient states that she had planned colonoscopy and had done preparation for it but they were not able to get in very far due to stricture apparently of some type.  She has been able to eat and drink and reports no nausea since that time but has had persistent pain that is probably a little bit worse when she is upright or walking in the supraumbilical area where she had ventral hernia repair.  She notes no fever chills or sweats.  She really has not had much for rectal output since the time of colonoscopy due to the colonic prep.  She contacted her gastroenterologist and was directed to our emergency department.      Allergies:  Allergies   Allergen Reactions     Codeine Nausea and Vomiting     Penicillins Hives     Occurred during childhood, Other reaction(s): *Unknown - Childhood Rxn, tolerates cephalosporins w/o problems       Problem List:    Patient Active Problem List    Diagnosis Date Noted     Fibroid uterus 03/13/2018     Priority: Medium     Recurrent  ventral hernia with incarceration      Priority: Medium        Past Medical History:    Past Medical History:   Diagnosis Date     Depression      GERD (gastroesophageal reflux disease)      Hyperlipidemia      Hypertension      Hypothyroidism      Leiomyoma of uterus      PONV (postoperative nausea and vomiting)      Seasonal allergies      Ventral hernia        Past Surgical History:    Past Surgical History:   Procedure Laterality Date     APPENDECTOMY       CHOLECYSTECTOMY       COLON SURGERY  2009    partial colon resection     EYE SURGERY      for proptosis     HEMIARTHROPLASTY SHOULDER FRACTURE Right      HERNIA REPAIR       HERNIORRHAPHY VENTRAL N/A 3/13/2018    Procedure: VENTRAL HERNIA REPAIR;  Surgeon: Josue Love MD;  Location: [facility] [place];  Service:      HUMERUS FRACTURE SURGERY       HYSTERECTOMY Bilateral 3/13/2018    Procedure: EXPLORATORY LAPAROTOMY TOTAL ABDOMINAL HYSTERECTOMY, BILATERAL SALPINGO OOPHORECTOMY PELVIC WASHINGS LYSIS OF ADHESIONS;  Surgeon: Sheri Zamarripa MD;  Location: [facility] [place];  Service:      THYROID SURGERY      ablation     TUBAL LIGATION         Family History:    Family History   Problem Relation Age of Onset     Cancer Father 76.00        brain     Breast Cancer No family hx of        Social History:  Marital Status:   [2]  Social History     Tobacco Use     Smoking status: Former     Types: Cigarettes     Quit date: 2000     Years since quittin.4     Smokeless tobacco: Never   Substance Use Topics     Alcohol use: Yes     Comment: Alcoholic Drinks/day: 1 drink a month      Drug use: No        Medications:    aspirin 81 MG EC tablet  betamethasone valerate (VALISONE) 0.1 % cream  calcium carbonate (OS-VADIM) 600 mg calcium (1,500 mg) tablet  cholecalciferol, vitamin D3, 1,000 unit tablet  levothyroxine (SYNTHROID, LEVOTHROID) 150 MCG tablet  lisinopril (PRINIVIL,ZESTRIL) 10 MG tablet  naproxen sodium (ALEVE) 220 MG  "tablet  OMEGA-3/DHA/EPA/FISH OIL (FISH OIL-OMEGA-3 FATTY ACIDS) 300-1,000 mg capsule  omeprazole (PRILOSEC) 40 MG capsule  peg 400-propylene glycol (SYSTANE, PROPYLENE GLYCOL,) 0.4-0.3 % Drop  pravastatin (PRAVACHOL) 40 MG tablet          Review of Systems   All other systems reviewed and are negative.      Physical Exam   BP: 134/80  Pulse: 78  Temp: 97.2  F (36.2  C)  Resp: 18  Height: 154.9 cm (5' 1\")  Weight: 66.2 kg (146 lb)  SpO2: 96 %      Physical Exam  Vitals and nursing note reviewed.   Constitutional:       General: She is not in acute distress.     Appearance: She is well-developed and normal weight. She is not ill-appearing.   HENT:      Head: Normocephalic and atraumatic.      Mouth/Throat:      Mouth: Mucous membranes are moist.      Pharynx: Oropharynx is clear.   Eyes:      Extraocular Movements: Extraocular movements intact.      Pupils: Pupils are equal, round, and reactive to light.   Cardiovascular:      Rate and Rhythm: Normal rate and regular rhythm.      Heart sounds: Normal heart sounds.   Pulmonary:      Effort: Pulmonary effort is normal.      Breath sounds: Normal breath sounds.   Abdominal:      Comments: Soft bowel sounds present minimal tenderness supraumbilical midline area in the distribution of previous ventral hernia surgery.  Scar midline.  No rebound or guarding.  No referred pain no CVA tenderness.   Skin:     General: Skin is warm and dry.      Capillary Refill: Capillary refill takes less than 2 seconds.   Neurological:      General: No focal deficit present.      Mental Status: She is alert.   Psychiatric:         Mood and Affect: Mood normal.         Behavior: Behavior normal.         ED Course                 Procedures                Results for orders placed or performed during the hospital encounter of 06/16/23 (from the past 24 hour(s))   CBC with platelets, differential    Narrative    The following orders were created for panel order CBC with platelets, " differential.  Procedure                               Abnormality         Status                     ---------                               -----------         ------                     CBC with platelets and d...[[id number]]                      Final result                 Please view results for these tests on the individual orders.   Comprehensive metabolic panel   Result Value Ref Range    Sodium 131 (L) 136 - 145 mmol/L    Potassium 3.6 3.4 - 5.3 mmol/L    Chloride 94 (L) 98 - 107 mmol/L    Carbon Dioxide (CO2) 23 22 - 29 mmol/L    Anion Gap 14 7 - 15 mmol/L    Urea Nitrogen 21.8 8.0 - 23.0 mg/dL    Creatinine 1.24 (H) 0.51 - 0.95 mg/dL    Calcium 10.0 8.8 - 10.2 mg/dL    Glucose 100 (H) 70 - 99 mg/dL    Alkaline Phosphatase 84 35 - 104 U/L    AST 24 0 - 45 U/L    ALT 16 0 - 50 U/L    Protein Total 6.5 6.4 - 8.3 g/dL    Albumin 3.8 3.5 - 5.2 g/dL    Bilirubin Total 0.3 <=1.2 mg/dL    GFR Estimate 46 (L) >60 mL/min/1.73m2   Lipase   Result Value Ref Range    Lipase 147 (H) 13 - 60 U/L   CBC with platelets and differential   Result Value Ref Range    WBC Count 7.7 4.0 - 11.0 10e3/uL    RBC Count 4.51 3.80 - 5.20 10e6/uL    Hemoglobin 13.0 11.7 - 15.7 g/dL    Hematocrit 37.1 35.0 - 47.0 %    MCV 82 78 - 100 fL    MCH 28.8 26.5 - 33.0 pg    MCHC 35.0 31.5 - 36.5 g/dL    RDW 12.9 10.0 - 15.0 %    Platelet Count 274 150 - 450 10e3/uL    % Neutrophils 71 %    % Lymphocytes 14 %    % Monocytes 11 %    % Eosinophils 3 %    % Basophils 1 %    % Immature Granulocytes 0 %    NRBCs per 100 WBC 0 <1 /100    Absolute Neutrophils 5.5 1.6 - 8.3 10e3/uL    Absolute Lymphocytes 1.1 0.8 - 5.3 10e3/uL    Absolute Monocytes 0.8 0.0 - 1.3 10e3/uL    Absolute Eosinophils 0.3 0.0 - 0.7 10e3/uL    Absolute Basophils 0.1 0.0 - 0.2 10e3/uL    Absolute Immature Granulocytes 0.0 <=0.4 10e3/uL    Absolute NRBCs 0.0 10e3/uL   CT Abdomen Pelvis w Contrast    Narrative    CT ABDOMEN PELVIS W CONTRAST 6/16/2023 1:12 PM    CLINICAL HISTORY:  Central abdominal pain with history of ventral  hernia repair x4    TECHNIQUE: CT scan of the abdomen and pelvis was performed following  injection of IV contrast. Multiplanar reformats were obtained. Dose  reduction techniques were used.  CONTRAST: 64 mL Isovue-370    COMPARISON: None.    FINDINGS:   LOWER CHEST: Unremarkable.    HEPATOBILIARY: Cholecystectomy. No evidence of biliary obstruction.    PANCREAS: Normal.    SPLEEN: Normal.    ADRENAL GLANDS: Normal.    KIDNEYS/BLADDER: Bilateral simple-appearing cysts, no specific  follow-up recommended. No hydronephrosis. Urinary bladder is partially  obscured but otherwise unremarkable.    BOWEL: Likely prior right hemicolectomy. Diverticulosis in the colon.  No acute inflammatory change. No obstruction.     PELVIC ORGANS: Hysterectomy.    ADDITIONAL FINDINGS: Likely prior ventral hernia repair with mesh with  inferior diastases recti and superimposed fat containing hernia,  aperture measuring 1.7 cm (series 3 image 114). No internal structures  or fat stranding to indicate acute complication.    MUSCULOSKELETAL: Bilateral hip arthroplasties with significant  resultant artifact pelvis. No acute bony abnormality. Mild lumbar  scoliosis.      Impression    IMPRESSION:   1.  Prior ventral hernia repair with small inferior fat-containing  ventral hernia as above without evidence of acute complication.  2.  No additional visualized explanation for patient's symptoms.    MADELEINE WOODS MD         SYSTEM ID:  [id number]       Medications   0.9% sodium chloride BOLUS (1,000 mLs Intravenous $New Bag 6/16/23 1327)   iopamidol (ISOVUE-370) solution 64 mL (64 mLs Intravenous $Given 6/16/23 1259)   sodium chloride 0.9 % bag 500mL for CT scan flush use (58 mLs Intravenous $Given 6/16/23 1258)   3:20 PM  Reviewed results in the room with the patient who is currently comfortable and minimal discomfort in the supraumbilical area identified previously.  I suspect that the bump in her  lipase relates to the recent colonoscopy prep, it may be the reason for her pain however her CT abdomen shows a normal-appearing pancreas at this time.  Her labs are otherwise reassuring with no white count, vitally stable and within adult normals.  I suspect that this represents some ongoing chronic irritation relating to her previous ventral hernia repairs as well as possibly a small component of mild pancreatitis.  We discussed options and at this point the patient would like to go home which is excepted and provided that she follows a bland diet and clear fluids until symptoms have fully resolved.  If this is not the case if she is worsening or not improving she will return to the emergency department at any time.      Assessments & Plan (with Medical Decision Making)   Assessment and plan with medical decision making at the time stamp above.      Disclaimer: This note consists of symbols derived from keyboarding, dictation and/or voice recognition software. As a result, there may be errors in the script that have gone undetected. Please consider this when interpreting information found in this chart.      I have reviewed the nursing notes.    I have reviewed the findings, diagnosis, plan and need for follow up with the patient.        New Prescriptions    No medications on file       Final diagnoses:   Periumbilical abdominal pain   Acute pancreatitis without infection or necrosis, unspecified pancreatitis type   Ventral hernia without obstruction or gangrene       6/16/2023   [facility] EMERGENCY DEPT     Johny Segura MD  06/16/23 6647

## 2023-06-16 NOTE — DISCHARGE INSTRUCTIONS
[place] diet and clear fluids as we discussed until clearly improving.  Tylenol as needed for pain  Return if worse or changes.

## 2023-06-16 NOTE — ED TRIAGE NOTES
Pt reports she had a colonoscopy on last Wednesday, pt reports periumbilical pain since. Pt reports they had a difficult time with the colonoscopy due to scar tissue and pt needs to have further testing done. Pt reports hx of hernia repairs in the past. Pt reports pain 7/10, pt denies nausea, vomiting and fevers at this time.

## 2023-08-07 ENCOUNTER — LAB REQUISITION (OUTPATIENT)
Dept: LAB | Facility: CLINIC | Age: 73
End: 2023-08-07
Payer: COMMERCIAL

## 2023-08-07 DIAGNOSIS — Z87.19 PERSONAL HISTORY OF OTHER DISEASES OF THE DIGESTIVE SYSTEM: ICD-10-CM

## 2023-08-07 PROCEDURE — 80053 COMPREHEN METABOLIC PANEL: CPT | Mod: ORL | Performed by: PHYSICIAN ASSISTANT

## 2023-08-07 PROCEDURE — 83690 ASSAY OF LIPASE: CPT | Mod: ORL | Performed by: PHYSICIAN ASSISTANT

## 2023-08-08 LAB
ALBUMIN SERPL BCG-MCNC: 4.2 G/DL (ref 3.5–5.2)
ALP SERPL-CCNC: 88 U/L (ref 35–104)
ALT SERPL W P-5'-P-CCNC: 23 U/L (ref 0–50)
ANION GAP SERPL CALCULATED.3IONS-SCNC: 11 MMOL/L (ref 7–15)
AST SERPL W P-5'-P-CCNC: 23 U/L (ref 0–45)
BILIRUB SERPL-MCNC: 0.2 MG/DL
BUN SERPL-MCNC: 23.3 MG/DL (ref 8–23)
CALCIUM SERPL-MCNC: 9.7 MG/DL (ref 8.8–10.2)
CHLORIDE SERPL-SCNC: 108 MMOL/L (ref 98–107)
CREAT SERPL-MCNC: 1.15 MG/DL (ref 0.51–0.95)
DEPRECATED HCO3 PLAS-SCNC: 23 MMOL/L (ref 22–29)
GFR SERPL CREATININE-BSD FRML MDRD: 50 ML/MIN/1.73M2
GLUCOSE SERPL-MCNC: 102 MG/DL (ref 70–99)
LIPASE SERPL-CCNC: 110 U/L (ref 13–60)
POTASSIUM SERPL-SCNC: 4.1 MMOL/L (ref 3.4–5.3)
PROT SERPL-MCNC: 6.3 G/DL (ref 6.4–8.3)
SODIUM SERPL-SCNC: 142 MMOL/L (ref 136–145)

## 2023-08-21 ENCOUNTER — ANCILLARY PROCEDURE (OUTPATIENT)
Dept: MAMMOGRAPHY | Facility: CLINIC | Age: 73
End: 2023-08-21
Attending: FAMILY MEDICINE
Payer: COMMERCIAL

## 2023-08-21 DIAGNOSIS — Z12.31 VISIT FOR SCREENING MAMMOGRAM: ICD-10-CM

## 2023-08-21 PROCEDURE — 77067 SCR MAMMO BI INCL CAD: CPT

## 2023-12-11 ENCOUNTER — LAB REQUISITION (OUTPATIENT)
Dept: LAB | Facility: CLINIC | Age: 73
End: 2023-12-11
Payer: COMMERCIAL

## 2023-12-11 DIAGNOSIS — R06.02 SHORTNESS OF BREATH: ICD-10-CM

## 2023-12-11 DIAGNOSIS — R74.8 ABNORMAL LEVELS OF OTHER SERUM ENZYMES: ICD-10-CM

## 2023-12-11 PROCEDURE — 82150 ASSAY OF AMYLASE: CPT | Mod: ORL | Performed by: FAMILY MEDICINE

## 2023-12-11 PROCEDURE — 83690 ASSAY OF LIPASE: CPT | Mod: ORL | Performed by: FAMILY MEDICINE

## 2023-12-11 PROCEDURE — 80053 COMPREHEN METABOLIC PANEL: CPT | Mod: ORL | Performed by: FAMILY MEDICINE

## 2023-12-11 PROCEDURE — 84145 PROCALCITONIN (PCT): CPT | Mod: ORL | Performed by: FAMILY MEDICINE

## 2023-12-12 LAB
ALBUMIN SERPL BCG-MCNC: 3.7 G/DL (ref 3.5–5.2)
ALP SERPL-CCNC: 78 U/L (ref 40–150)
ALT SERPL W P-5'-P-CCNC: 17 U/L (ref 0–50)
AMYLASE SERPL-CCNC: 63 U/L (ref 28–100)
ANION GAP SERPL CALCULATED.3IONS-SCNC: 9 MMOL/L (ref 7–15)
AST SERPL W P-5'-P-CCNC: 23 U/L (ref 0–45)
BILIRUB SERPL-MCNC: 0.5 MG/DL
BUN SERPL-MCNC: 13 MG/DL (ref 8–23)
CALCIUM SERPL-MCNC: 8.3 MG/DL (ref 8.8–10.2)
CHLORIDE SERPL-SCNC: 98 MMOL/L (ref 98–107)
CREAT SERPL-MCNC: 1.1 MG/DL (ref 0.51–0.95)
DEPRECATED HCO3 PLAS-SCNC: 34 MMOL/L (ref 22–29)
EGFRCR SERPLBLD CKD-EPI 2021: 53 ML/MIN/1.73M2
GLUCOSE SERPL-MCNC: 91 MG/DL (ref 70–99)
LIPASE SERPL-CCNC: 59 U/L (ref 13–60)
POTASSIUM SERPL-SCNC: 2.7 MMOL/L (ref 3.4–5.3)
PROCALCITONIN SERPL IA-MCNC: 0.07 NG/ML
PROT SERPL-MCNC: 6.3 G/DL (ref 6.4–8.3)
SODIUM SERPL-SCNC: 141 MMOL/L (ref 135–145)

## 2024-02-26 ENCOUNTER — LAB REQUISITION (OUTPATIENT)
Dept: LAB | Facility: CLINIC | Age: 74
End: 2024-02-26
Payer: COMMERCIAL

## 2024-02-26 ENCOUNTER — HOSPITAL ENCOUNTER (EMERGENCY)
Facility: CLINIC | Age: 74
Discharge: HOME | End: 2024-02-26
Attending: EMERGENCY MEDICINE | Admitting: EMERGENCY MEDICINE
Payer: COMMERCIAL

## 2024-02-26 VITALS
DIASTOLIC BLOOD PRESSURE: 83 MMHG | HEART RATE: 84 BPM | OXYGEN SATURATION: 92 % | TEMPERATURE: 97 F | HEIGHT: 61 IN | RESPIRATION RATE: 12 BRPM | SYSTOLIC BLOOD PRESSURE: 146 MMHG | WEIGHT: 140 LBS | BODY MASS INDEX: 26.43 KG/M2

## 2024-02-26 DIAGNOSIS — N18.31 CHRONIC KIDNEY DISEASE, STAGE 3A (H): ICD-10-CM

## 2024-02-26 DIAGNOSIS — R55 SYNCOPE, UNSPECIFIED SYNCOPE TYPE: ICD-10-CM

## 2024-02-26 DIAGNOSIS — E03.9 HYPOTHYROIDISM, UNSPECIFIED: ICD-10-CM

## 2024-02-26 DIAGNOSIS — I12.9 HYPERTENSIVE CHRONIC KIDNEY DISEASE WITH STAGE 1 THROUGH STAGE 4 CHRONIC KIDNEY DISEASE, OR UNSPECIFIED CHRONIC KIDNEY DISEASE: ICD-10-CM

## 2024-02-26 DIAGNOSIS — E78.5 HYPERLIPIDEMIA, UNSPECIFIED: ICD-10-CM

## 2024-02-26 LAB
ALBUMIN UR-MCNC: NEGATIVE MG/DL
ANION GAP SERPL CALCULATED.3IONS-SCNC: 10 MMOL/L (ref 7–15)
APPEARANCE UR: CLEAR
BASOPHILS # BLD AUTO: 0.1 10E3/UL (ref 0–0.2)
BASOPHILS NFR BLD AUTO: 1 %
BILIRUB UR QL STRIP: NEGATIVE
BUN SERPL-MCNC: 23.4 MG/DL (ref 8–23)
CALCIUM SERPL-MCNC: 9.7 MG/DL (ref 8.8–10.2)
CHLORIDE SERPL-SCNC: 104 MMOL/L (ref 98–107)
COLOR UR AUTO: YELLOW
CREAT SERPL-MCNC: 1.12 MG/DL (ref 0.51–0.95)
DEPRECATED HCO3 PLAS-SCNC: 24 MMOL/L (ref 22–29)
EGFRCR SERPLBLD CKD-EPI 2021: 51 ML/MIN/1.73M2
EOSINOPHIL # BLD AUTO: 0.2 10E3/UL (ref 0–0.7)
EOSINOPHIL NFR BLD AUTO: 3 %
ERYTHROCYTE [DISTWIDTH] IN BLOOD BY AUTOMATED COUNT: 13.9 % (ref 10–15)
ERYTHROCYTE [DISTWIDTH] IN BLOOD BY AUTOMATED COUNT: 14.4 % (ref 10–15)
GLUCOSE SERPL-MCNC: 168 MG/DL (ref 70–99)
GLUCOSE UR STRIP-MCNC: NEGATIVE MG/DL
HCT VFR BLD AUTO: 37 % (ref 35–47)
HCT VFR BLD AUTO: 40.7 % (ref 35–47)
HGB BLD-MCNC: 12.4 G/DL (ref 11.7–15.7)
HGB BLD-MCNC: 13.1 G/DL (ref 11.7–15.7)
HGB UR QL STRIP: NEGATIVE
HOLD SPECIMEN: NORMAL
IMM GRANULOCYTES # BLD: 0 10E3/UL
IMM GRANULOCYTES NFR BLD: 1 %
KETONES UR STRIP-MCNC: NEGATIVE MG/DL
LEUKOCYTE ESTERASE UR QL STRIP: NEGATIVE
LYMPHOCYTES # BLD AUTO: 1.2 10E3/UL (ref 0.8–5.3)
LYMPHOCYTES NFR BLD AUTO: 17 %
MCH RBC QN AUTO: 27.5 PG (ref 26.5–33)
MCH RBC QN AUTO: 28.2 PG (ref 26.5–33)
MCHC RBC AUTO-ENTMCNC: 32.2 G/DL (ref 31.5–36.5)
MCHC RBC AUTO-ENTMCNC: 33.5 G/DL (ref 31.5–36.5)
MCV RBC AUTO: 84 FL (ref 78–100)
MCV RBC AUTO: 86 FL (ref 78–100)
MONOCYTES # BLD AUTO: 0.6 10E3/UL (ref 0–1.3)
MONOCYTES NFR BLD AUTO: 9 %
NEUTROPHILS # BLD AUTO: 5 10E3/UL (ref 1.6–8.3)
NEUTROPHILS NFR BLD AUTO: 69 %
NITRATE UR QL: NEGATIVE
NRBC # BLD AUTO: 0 10E3/UL
NRBC BLD AUTO-RTO: 0 /100
PH UR STRIP: 7 [PH] (ref 5–7)
PLATELET # BLD AUTO: 246 10E3/UL (ref 150–450)
PLATELET # BLD AUTO: 274 10E3/UL (ref 150–450)
POTASSIUM SERPL-SCNC: 4.6 MMOL/L (ref 3.4–5.3)
RBC # BLD AUTO: 4.39 10E6/UL (ref 3.8–5.2)
RBC # BLD AUTO: 4.76 10E6/UL (ref 3.8–5.2)
RBC URINE: 2 /HPF
SODIUM SERPL-SCNC: 138 MMOL/L (ref 135–145)
SP GR UR STRIP: 1.01 (ref 1–1.03)
TROPONIN T SERPL HS-MCNC: 20 NG/L
TROPONIN T SERPL HS-MCNC: 20 NG/L
UROBILINOGEN UR STRIP-MCNC: NORMAL MG/DL
WBC # BLD AUTO: 6.8 10E3/UL (ref 4–11)
WBC # BLD AUTO: 7.1 10E3/UL (ref 4–11)
WBC URINE: 1 /HPF

## 2024-02-26 PROCEDURE — 85025 COMPLETE CBC W/AUTO DIFF WBC: CPT | Performed by: EMERGENCY MEDICINE

## 2024-02-26 PROCEDURE — 80053 COMPREHEN METABOLIC PANEL: CPT | Performed by: EMERGENCY MEDICINE

## 2024-02-26 PROCEDURE — 84484 ASSAY OF TROPONIN QUANT: CPT | Performed by: EMERGENCY MEDICINE

## 2024-02-26 PROCEDURE — 93005 ELECTROCARDIOGRAM TRACING: CPT | Performed by: EMERGENCY MEDICINE

## 2024-02-26 PROCEDURE — 80061 LIPID PANEL: CPT | Mod: ORL | Performed by: FAMILY MEDICINE

## 2024-02-26 PROCEDURE — 84443 ASSAY THYROID STIM HORMONE: CPT | Mod: ORL | Performed by: FAMILY MEDICINE

## 2024-02-26 PROCEDURE — 81001 URINALYSIS AUTO W/SCOPE: CPT | Performed by: EMERGENCY MEDICINE

## 2024-02-26 PROCEDURE — 82607 VITAMIN B-12: CPT | Mod: ORL | Performed by: FAMILY MEDICINE

## 2024-02-26 PROCEDURE — 36415 COLL VENOUS BLD VENIPUNCTURE: CPT | Performed by: EMERGENCY MEDICINE

## 2024-02-26 PROCEDURE — 82306 VITAMIN D 25 HYDROXY: CPT | Mod: ORL | Performed by: FAMILY MEDICINE

## 2024-02-26 PROCEDURE — 85027 COMPLETE CBC AUTOMATED: CPT | Mod: ORL | Performed by: FAMILY MEDICINE

## 2024-02-26 PROCEDURE — 99284 EMERGENCY DEPT VISIT MOD MDM: CPT | Mod: 25 | Performed by: EMERGENCY MEDICINE

## 2024-02-26 PROCEDURE — 80048 BASIC METABOLIC PNL TOTAL CA: CPT | Performed by: EMERGENCY MEDICINE

## 2024-02-26 PROCEDURE — 258N000003 HC RX IP 258 OP 636: Performed by: EMERGENCY MEDICINE

## 2024-02-26 PROCEDURE — 96360 HYDRATION IV INFUSION INIT: CPT | Performed by: EMERGENCY MEDICINE

## 2024-02-26 PROCEDURE — 93010 ELECTROCARDIOGRAM REPORT: CPT | Performed by: EMERGENCY MEDICINE

## 2024-02-26 RX ADMIN — SODIUM CHLORIDE, POTASSIUM CHLORIDE, SODIUM LACTATE AND CALCIUM CHLORIDE 500 ML: 600; 310; 30; 20 INJECTION, SOLUTION INTRAVENOUS at 13:51

## 2024-02-26 ASSESSMENT — ACTIVITIES OF DAILY LIVING (ADL)
ADLS_ACUITY_SCORE: 35

## 2024-02-26 ASSESSMENT — COLUMBIA-SUICIDE SEVERITY RATING SCALE - C-SSRS
2. HAVE YOU ACTUALLY HAD ANY THOUGHTS OF KILLING YOURSELF IN THE PAST MONTH?: NO
6. HAVE YOU EVER DONE ANYTHING, STARTED TO DO ANYTHING, OR PREPARED TO DO ANYTHING TO END YOUR LIFE?: NO
1. IN THE PAST MONTH, HAVE YOU WISHED YOU WERE DEAD OR WISHED YOU COULD GO TO SLEEP AND NOT WAKE UP?: NO

## 2024-02-26 NOTE — DISCHARGE INSTRUCTIONS
You were seen today after passing out during a blood draw. Things here look ok. In the future, try to eat food before you get blood drawn, unless the doctor says to fast. Even then, you can drink water beforehand.    Take care and feel better soon.

## 2024-02-26 NOTE — ED TRIAGE NOTES
Pt had syncopal episode while having blood drawn in the clinic.      Triage Assessment (Adult)       Row Name 02/26/24 1211          Triage Assessment    Airway WDL WDL        Cognitive/Neuro/Behavioral WDL    Cognitive/Neuro/Behavioral WDL WDL

## 2024-02-26 NOTE — ED PROVIDER NOTES
History     Chief Complaint   Patient presents with    Syncope     HPI  Mary Martino is a 74 year old female who presents with syncope.  The patient had a blood draw today.  She during the blood draw looked at the blood and felt lightheaded and her vision was going dark and she lost consciousness briefly.  She did not hit her head.  No cardiac history.  Denies any palpitation or chest pain prior to this.  Denies any recent illness or fevers.  She feels well at this time.  She was would like to go home if possible.  Fevers, chills, diarrhea.  She does relate that she did not eat breakfast today.  Arrives via EMS.  They provided additional history.    Allergies:  Allergies   Allergen Reactions    Codeine Nausea and Vomiting    Penicillins Hives     Occurred during childhood, Other reaction(s): *Unknown - Childhood Rxn, tolerates cephalosporins w/o problems       Problem List:    Patient Active Problem List    Diagnosis Date Noted    Fibroid uterus 03/13/2018     Priority: Medium    Recurrent ventral hernia with incarceration      Priority: Medium        Past Medical History:    Past Medical History:   Diagnosis Date    Depression     GERD (gastroesophageal reflux disease)     Hyperlipidemia     Hypertension     Hypothyroidism     Leiomyoma of uterus     PONV (postoperative nausea and vomiting)     Seasonal allergies     Ventral hernia        Past Surgical History:    Past Surgical History:   Procedure Laterality Date    APPENDECTOMY      CHOLECYSTECTOMY      COLON SURGERY  2009    partial colon resection    EYE SURGERY      for proptosis    HEMIARTHROPLASTY SHOULDER FRACTURE Right     HERNIA REPAIR      HERNIORRHAPHY VENTRAL N/A 3/13/2018    Procedure: VENTRAL HERNIA REPAIR;  Surgeon: Josue Love MD;  Location: [facility] [place];  Service:     HUMERUS FRACTURE SURGERY      HYSTERECTOMY Bilateral 3/13/2018    Procedure: EXPLORATORY LAPAROTOMY TOTAL ABDOMINAL HYSTERECTOMY, BILATERAL SALPINGO OOPHORECTOMY  "PELVIC WASHINGS LYSIS OF ADHESIONS;  Surgeon: Sheri Zamarripa MD;  Location: [facility] OR;  Service:     THYROID SURGERY      ablation    TUBAL LIGATION         Family History:    Family History   Problem Relation Age of Onset    Cancer Father 76.00        brain    Breast Cancer No family hx of        Social History:  Marital Status:   [2]  Social History     Tobacco Use    Smoking status: Former     Types: Cigarettes     Quit date: 2000     Years since quittin.1    Smokeless tobacco: Never   Substance Use Topics    Alcohol use: Yes     Comment: Alcoholic Drinks/day: 1 drink a month     Drug use: No        Medications:    aspirin 81 MG EC tablet  betamethasone valerate (VALISONE) 0.1 % cream  calcium carbonate (OS-VADIM) 600 mg calcium (1,500 mg) tablet  cholecalciferol, vitamin D3, 1,000 unit tablet  levothyroxine (SYNTHROID, LEVOTHROID) 150 MCG tablet  lisinopril (PRINIVIL,ZESTRIL) 10 MG tablet  naproxen sodium (ALEVE) 220 MG tablet  OMEGA-3/DHA/EPA/FISH OIL (FISH OIL-OMEGA-3 FATTY ACIDS) 300-1,000 mg capsule  omeprazole (PRILOSEC) 40 MG capsule  peg 400-propylene glycol (SYSTANE, PROPYLENE GLYCOL,) 0.4-0.3 % Drop  pravastatin (PRAVACHOL) 40 MG tablet          Review of Systems    Physical Exam   BP: (!) 145/95  Pulse: 69  Temp: 97  F (36.1  C)  Resp: 18  Height: 154.9 cm (5' 1\")  Weight: 63.5 kg (140 lb)  SpO2: 97 %      Physical Exam  Vitals reviewed.   Constitutional:       General: She is not in acute distress.     Appearance: She is not toxic-appearing.   HENT:      Head: Normocephalic.      Right Ear: External ear normal.      Left Ear: External ear normal.   Eyes:      General:         Right eye: No discharge.         Left eye: No discharge.   Cardiovascular:      Rate and Rhythm: Normal rate.      Pulses: Normal pulses.   Pulmonary:      Effort: No respiratory distress.      Breath sounds: No wheezing.   Abdominal:      General: There is no distension.      Tenderness: There is no " abdominal tenderness.   Musculoskeletal:         General: No swelling.   Skin:     Capillary Refill: Capillary refill takes less than 2 seconds.      Coloration: Skin is not jaundiced.   Neurological:      Mental Status: She is alert and oriented to person, place, and time.      Cranial Nerves: No cranial nerve deficit.   Psychiatric:      Comments: Very nice         ED Course     ED Course as of 02/26/24 1511   Mon Feb 26, 2024   1315 For unknown reasons, labs were drawn prior to my seeing the patient.  This shows her creatinine is basically at baseline but she does have a slight bump in her BUN.  She is not confused.  Her high-sensitivity troponin is 20 and I do not think that this was a cardiac event.   1505 Urine reassuring. Patient ate here. Wants to go home. Gave ER precautions. Will discharge at her request at this time.      Procedures              EKG Interpretation:      Interpreted by Forrest Brooke MD  Time reviewed: 114   Symptoms at time of EKG: none (had syncope)  Rhythm: normal sinus   Rate: normal  Axis: normal  Ectopy: none  Conduction: normal, no Brugada pattern, no delta wave, no epsilon wave, no hocm pattern, no prolonged qt  ST Segments/ T Waves: No ST-T wave changes  Q Waves: none  Comparison to prior: no ischemic changes from 2018    Clinical Impression: normal EKG      Results for orders placed or performed during the hospital encounter of 02/26/24 (from the past 24 hour(s))   [place] Draw    Narrative    The following orders were created for panel order [place] Draw.  Procedure                               Abnormality         Status                     ---------                               -----------         ------                     Extra Blue Top Tube[[id number]]                              Final result               Extra Red Top Tube[[id number]]                               Final result               Extra Green Top (Lithium...[[id number]]                      Final result                Extra Purple Top Tube[[id number]]                            Final result                 Please view results for these tests on the individual orders.   Extra Blue Top Tube   Result Value Ref Range    Hold Specimen JIC    Extra Red Top Tube   Result Value Ref Range    Hold Specimen JIC    Extra Green Top (Lithium Heparin) Tube   Result Value Ref Range    Hold Specimen JIC    Extra Purple Top Tube   Result Value Ref Range    Hold Specimen JIC    CBC with Platelets & Differential    Narrative    The following orders were created for panel order CBC with Platelets & Differential.  Procedure                               Abnormality         Status                     ---------                               -----------         ------                     CBC with platelets and d...[[id number]]                      Final result                 Please view results for these tests on the individual orders.   Basic metabolic panel   Result Value Ref Range    Sodium 138 135 - 145 mmol/L    Potassium 4.6 3.4 - 5.3 mmol/L    Chloride 104 98 - 107 mmol/L    Carbon Dioxide (CO2) 24 22 - 29 mmol/L    Anion Gap 10 7 - 15 mmol/L    Urea Nitrogen 23.4 (H) 8.0 - 23.0 mg/dL    Creatinine 1.12 (H) 0.51 - 0.95 mg/dL    GFR Estimate 51 (L) >60 mL/min/1.73m2    Calcium 9.7 8.8 - 10.2 mg/dL    Glucose 168 (H) 70 - 99 mg/dL   Troponin T, High Sensitivity   Result Value Ref Range    Troponin T, High Sensitivity 20 (H) <=14 ng/L   CBC with platelets and differential   Result Value Ref Range    WBC Count 7.1 4.0 - 11.0 10e3/uL    RBC Count 4.39 3.80 - 5.20 10e6/uL    Hemoglobin 12.4 11.7 - 15.7 g/dL    Hematocrit 37.0 35.0 - 47.0 %    MCV 84 78 - 100 fL    MCH 28.2 26.5 - 33.0 pg    MCHC 33.5 31.5 - 36.5 g/dL    RDW 13.9 10.0 - 15.0 %    Platelet Count 246 150 - 450 10e3/uL    % Neutrophils 69 %    % Lymphocytes 17 %    % Monocytes 9 %    % Eosinophils 3 %    % Basophils 1 %    % Immature Granulocytes 1 %    NRBCs per 100 WBC 0 <1 /100     Absolute Neutrophils 5.0 1.6 - 8.3 10e3/uL    Absolute Lymphocytes 1.2 0.8 - 5.3 10e3/uL    Absolute Monocytes 0.6 0.0 - 1.3 10e3/uL    Absolute Eosinophils 0.2 0.0 - 0.7 10e3/uL    Absolute Basophils 0.1 0.0 - 0.2 10e3/uL    Absolute Immature Granulocytes 0.0 <=0.4 10e3/uL    Absolute NRBCs 0.0 10e3/uL   Troponin T, High Sensitivity   Result Value Ref Range    Troponin T, High Sensitivity 20 (H) <=14 ng/L   UA with Microscopic reflex to Culture    Specimen: Urine, Clean Catch   Result Value Ref Range    Color Urine Yellow Colorless, Straw, Light Yellow, Yellow    Appearance Urine Clear Clear    Glucose Urine Negative Negative mg/dL    Bilirubin Urine Negative Negative    Ketones Urine Negative Negative mg/dL    Specific Gravity Urine 1.011 1.003 - 1.035    Blood Urine Negative Negative    pH Urine 7.0 5.0 - 7.0    Protein Albumin Urine Negative Negative mg/dL    Urobilinogen Urine Normal Normal, 2.0 mg/dL    Nitrite Urine Negative Negative    Leukocyte Esterase Urine Negative Negative    RBC Urine 2 <=2 /HPF    WBC Urine 1 <=5 /HPF    Narrative    Urine Culture not indicated       Medications   lactated ringers BOLUS 500 mL (500 mLs Intravenous $New Bag 2/26/24 1351)       Assessments & Plan (with Medical Decision Making)     I reviewed the patient's previous EKG and the 1 today and they are reassuring.  She feels well.  I got her some food.  She is agreeable to giving us a urine sample.  She is nontoxic.  I presume that this was most likely a vasovagal event in the setting of a blood draw.  I will reassess her after this.  I do not think I need to pursue a broad workup.    I have reviewed the nursing notes.    I have reviewed the findings, diagnosis, plan and need for follow up with the patient.        Medical Decision Making  The patient's presentation was of high complexity (an acute health issue posing potential threat to life or bodily function).    The patient's evaluation involved:  an assessment requiring  an independent historian (see separate area of note for details)  ordering and/or review of 3+ test(s) in this encounter (see separate area of note for details)    The patient's management necessitated high risk (a decision regarding hospitalization).        New Prescriptions    No medications on file       Final diagnoses:   Syncope, unspecified syncope type       2/26/2024   [facility] EMERGENCY DEPT       Forrest Brooke MD  02/26/24 9810

## 2024-02-27 LAB
ALBUMIN SERPL BCG-MCNC: 4.1 G/DL (ref 3.5–5.2)
ALP SERPL-CCNC: 77 U/L (ref 40–150)
ALT SERPL W P-5'-P-CCNC: 17 U/L (ref 0–50)
ANION GAP SERPL CALCULATED.3IONS-SCNC: 11 MMOL/L (ref 7–15)
AST SERPL W P-5'-P-CCNC: 23 U/L (ref 0–45)
BILIRUB SERPL-MCNC: 0.4 MG/DL
BUN SERPL-MCNC: 22.7 MG/DL (ref 8–23)
CALCIUM SERPL-MCNC: 10 MG/DL (ref 8.8–10.2)
CHLORIDE SERPL-SCNC: 104 MMOL/L (ref 98–107)
CHOLEST SERPL-MCNC: 172 MG/DL
CREAT SERPL-MCNC: 1.12 MG/DL (ref 0.51–0.95)
DEPRECATED HCO3 PLAS-SCNC: 25 MMOL/L (ref 22–29)
EGFRCR SERPLBLD CKD-EPI 2021: 51 ML/MIN/1.73M2
FASTING STATUS PATIENT QL REPORTED: ABNORMAL
GLUCOSE SERPL-MCNC: 98 MG/DL (ref 70–99)
HDLC SERPL-MCNC: 47 MG/DL
LDLC SERPL CALC-MCNC: 98 MG/DL
NONHDLC SERPL-MCNC: 125 MG/DL
POTASSIUM SERPL-SCNC: 4.1 MMOL/L (ref 3.4–5.3)
PROT SERPL-MCNC: 6.7 G/DL (ref 6.4–8.3)
SODIUM SERPL-SCNC: 140 MMOL/L (ref 135–145)
TRIGL SERPL-MCNC: 137 MG/DL
TSH SERPL DL<=0.005 MIU/L-ACNC: 2.52 UIU/ML (ref 0.3–4.2)
VIT B12 SERPL-MCNC: 505 PG/ML (ref 232–1245)
VIT D+METAB SERPL-MCNC: 47 NG/ML (ref 20–50)

## 2024-05-15 ENCOUNTER — OFFICE VISIT (OUTPATIENT)
Dept: SURGERY | Facility: CLINIC | Age: 74
End: 2024-05-15
Payer: COMMERCIAL

## 2024-05-15 VITALS — SYSTOLIC BLOOD PRESSURE: 110 MMHG | WEIGHT: 142 LBS | BODY MASS INDEX: 26.83 KG/M2 | DIASTOLIC BLOOD PRESSURE: 64 MMHG

## 2024-05-15 DIAGNOSIS — R10.13 ABDOMINAL PAIN, EPIGASTRIC: Primary | ICD-10-CM

## 2024-05-15 PROCEDURE — 99204 OFFICE O/P NEW MOD 45 MIN: CPT | Performed by: SURGERY

## 2024-05-15 RX ORDER — LEVOTHYROXINE SODIUM 100 UG/1
100 TABLET ORAL DAILY
COMMUNITY
Start: 2024-04-25

## 2024-05-15 RX ORDER — LEVOFLOXACIN 500 MG/1
1 TABLET, FILM COATED ORAL
COMMUNITY
Start: 2024-01-06 | End: 2024-05-23

## 2024-05-15 RX ORDER — LISINOPRIL AND HYDROCHLOROTHIAZIDE 12.5; 2 MG/1; MG/1
1 TABLET ORAL EVERY EVENING
COMMUNITY

## 2024-05-15 RX ORDER — DIMENHYDRINATE 50 MG
1 TABLET ORAL DAILY
COMMUNITY

## 2024-05-15 NOTE — PROGRESS NOTES
"GENERAL SURGICAL CONSULTATION    I was requested by Misha Alonzo to consult on this pt to evaluate them for abdominal pain.    HPI:  This is a 74 year old female here today with abdominal pain. The pain is made worse by eating.  The pain is just above the umbilicus slightly Right of center.  I did a laparoscopic ventral hernia repair with a 5\" x 7\" ventreo mesh 3/13/2018 that covers the approximate area where she is having pain.       Allergies:Amlodipine, Codeine, and Penicillins    Past Medical History:   Diagnosis Date    Depression     GERD (gastroesophageal reflux disease)     Hyperlipidemia     Hypertension     Hypothyroidism     h/o grave's disease    Leiomyoma of uterus     PONV (postoperative nausea and vomiting)     Seasonal allergies     Ventral hernia        Past Surgical History:   Procedure Laterality Date    APPENDECTOMY      CHOLECYSTECTOMY      COLON SURGERY  2009    partial colon resection    EYE SURGERY      for proptosis    HEMIARTHROPLASTY SHOULDER FRACTURE Right     HERNIA REPAIR      HERNIORRHAPHY VENTRAL N/A 3/13/2018    Procedure: VENTRAL HERNIA REPAIR;  Surgeon: Josue Love MD;  Location: [facility] - [place];  Service:     HUMERUS FRACTURE SURGERY      HYSTERECTOMY Bilateral 3/13/2018    Procedure: EXPLORATORY LAPAROTOMY TOTAL ABDOMINAL HYSTERECTOMY, BILATERAL SALPINGO OOPHORECTOMY PELVIC WASHINGS LYSIS OF ADHESIONS;  Surgeon: Sheri Zamarripa MD;  Location: [facility] [place];  Service:     THYROID SURGERY      ablation    TUBAL LIGATION         CURRENT MEDS:  Current Outpatient Medications   Medication Sig Dispense Refill    aspirin 81 MG EC tablet [ASPIRIN 81 MG EC TABLET] Take 81 mg by mouth daily.      betamethasone valerate (VALISONE) 0.1 % cream [BETAMETHASONE VALERATE (VALISONE) 0.1 % CREAM] Apply 1 application topically 2 (two) times a day as needed.       calcium carbonate (OS-VADIM) 600 mg calcium (1,500 mg) tablet [CALCIUM CARBONATE (OS-VADIM) 600 MG CALCIUM (1,500 MG) " TABLET] Take 600 mg by mouth daily.       cholecalciferol, vitamin D3, 1,000 unit tablet [CHOLECALCIFEROL, VITAMIN D3, 1,000 UNIT TABLET] Take 1,000 Units by mouth daily.       Flaxseed, Linseed, (FLAX SEED OIL) 1000 MG capsule as directed Orally      levofloxacin (LEVAQUIN) 500 MG tablet Take 1 tablet by mouth daily at 2 pm      levothyroxine (SYNTHROID/LEVOTHROID) 100 MCG tablet       lisinopril (PRINIVIL,ZESTRIL) 10 MG tablet [LISINOPRIL (PRINIVIL,ZESTRIL) 10 MG TABLET] Take 10 mg by mouth daily.       lisinopril-hydrochlorothiazide (ZESTORETIC) 20-12.5 MG tablet TAKE 1 TABLET BY MOUTH ONCE  DAILY for 100      naproxen sodium (ALEVE) 220 MG tablet [NAPROXEN SODIUM (ALEVE) 220 MG TABLET] Take 220 mg by mouth 2 (two) times a day as needed.       omeprazole (PRILOSEC) 40 MG capsule [OMEPRAZOLE (PRILOSEC) 40 MG CAPSULE] Take 40 mg by mouth daily.       pravastatin (PRAVACHOL) 40 MG tablet [PRAVASTATIN (PRAVACHOL) 40 MG TABLET] Take 40 mg by mouth at bedtime.          Family History   Problem Relation Age of Onset    Cancer Father 76.00        brain    Breast Cancer No family hx of      Family history is not pertinent to this patients Chief Complaint.     reports that she quit smoking about 24 years ago. She has never used smokeless tobacco. She reports current alcohol use. She reports that she does not use drugs.    Review of Systems -   10 point Review of systems is negative except for; as mentioned above in HPI and PMHx    /64   Wt 64.4 kg (142 lb)   BMI 26.83 kg/m    Body mass index is 26.83 kg/m .    EXAM:  GENERAL: Well developed female  HEENT: EOMI, Anicteric Sclera, Moist Mucous Membranes,  In Mouth the pt does not have redness or bleeding gums  CARDIOVASCULAR: RRR w/out murmur   CHEST/LUNG: Clear to Auscultation  ABDOMEN:  Mildly tender to palpation just above the umbilicus, +BS, No palpable hernia.  MUSCULOSKELETAL:  No deformities with good range of motion in all extremities  NEURO: She is  ambulatory with good strength in both legs.  HEME/LYMPH: No Cervical Adenopathy or tenderness.     IMAGES:  I evaluated these images myself and her CT from June 2023 does not show any hernia's.  There is a loop of colon that comes in close approximation to where I believe the mesh is located.      Assessment/Plan:  Abdominal pain in a person who had a prior ventral hernia.  The pain seems to be worse with eating.  No new hernia seen .  I would be helpful to see an updated CT scan to help assess the source of this pain.      AB/ Pelvis CT with contrast.      Josue Love MD  [facility]  [phone]

## 2024-05-15 NOTE — LETTER
"    5/15/2024         RE: Mary Martino  [id number] [address] [address]        Dear Colleague,    Thank you for referring your patient, Mary Martino, to the [facility] [place]. Please see a copy of my visit note below.    GENERAL SURGICAL CONSULTATION    I was requested by Misha Alonzo to consult on this pt to evaluate them for abdominal pain.    HPI:  This is a 74 year old female here today with abdominal pain. The pain is made worse by eating.  The pain is just above the umbilicus slightly Right of center.  I did a laparoscopic ventral hernia repair with a 5\" x 7\" ventreo mesh 3/13/2018 that covers the approximate area where she is having pain.       Allergies:Amlodipine, Codeine, and Penicillins    Past Medical History:   Diagnosis Date     Depression      GERD (gastroesophageal reflux disease)      Hyperlipidemia      Hypertension      Hypothyroidism     h/o grave's disease     Leiomyoma of uterus      PONV (postoperative nausea and vomiting)      Seasonal allergies      Ventral hernia        Past Surgical History:   Procedure Laterality Date     APPENDECTOMY       CHOLECYSTECTOMY       COLON SURGERY  2009    partial colon resection     EYE SURGERY      for proptosis     HEMIARTHROPLASTY SHOULDER FRACTURE Right      HERNIA REPAIR       HERNIORRHAPHY VENTRAL N/A 3/13/2018    Procedure: VENTRAL HERNIA REPAIR;  Surgeon: Josue Love MD;  Location: [facility] - [place];  Service:      HUMERUS FRACTURE SURGERY       HYSTERECTOMY Bilateral 3/13/2018    Procedure: EXPLORATORY LAPAROTOMY TOTAL ABDOMINAL HYSTERECTOMY, BILATERAL SALPINGO OOPHORECTOMY PELVIC WASHINGS LYSIS OF ADHESIONS;  Surgeon: Sheri Zamarripa MD;  Location: [facility] [place];  Service:      THYROID SURGERY      ablation     TUBAL LIGATION         CURRENT MEDS:  Current Outpatient Medications   Medication Sig Dispense Refill     aspirin 81 MG EC tablet [ASPIRIN 81 MG EC TABLET] Take 81 mg by " mouth daily.       betamethasone valerate (VALISONE) 0.1 % cream [BETAMETHASONE VALERATE (VALISONE) 0.1 % CREAM] Apply 1 application topically 2 (two) times a day as needed.        calcium carbonate (OS-VADIM) 600 mg calcium (1,500 mg) tablet [CALCIUM CARBONATE (OS-VADIM) 600 MG CALCIUM (1,500 MG) TABLET] Take 600 mg by mouth daily.        cholecalciferol, vitamin D3, 1,000 unit tablet [CHOLECALCIFEROL, VITAMIN D3, 1,000 UNIT TABLET] Take 1,000 Units by mouth daily.        Flaxseed, Linseed, (FLAX SEED OIL) 1000 MG capsule as directed Orally       levofloxacin (LEVAQUIN) 500 MG tablet Take 1 tablet by mouth daily at 2 pm       levothyroxine (SYNTHROID/LEVOTHROID) 100 MCG tablet        lisinopril (PRINIVIL,ZESTRIL) 10 MG tablet [LISINOPRIL (PRINIVIL,ZESTRIL) 10 MG TABLET] Take 10 mg by mouth daily.        lisinopril-hydrochlorothiazide (ZESTORETIC) 20-12.5 MG tablet TAKE 1 TABLET BY MOUTH ONCE  DAILY for 100       naproxen sodium (ALEVE) 220 MG tablet [NAPROXEN SODIUM (ALEVE) 220 MG TABLET] Take 220 mg by mouth 2 (two) times a day as needed.        omeprazole (PRILOSEC) 40 MG capsule [OMEPRAZOLE (PRILOSEC) 40 MG CAPSULE] Take 40 mg by mouth daily.        pravastatin (PRAVACHOL) 40 MG tablet [PRAVASTATIN (PRAVACHOL) 40 MG TABLET] Take 40 mg by mouth at bedtime.          Family History   Problem Relation Age of Onset     Cancer Father 76.00        brain     Breast Cancer No family hx of      Family history is not pertinent to this patients Chief Complaint.     reports that she quit smoking about 24 years ago. She has never used smokeless tobacco. She reports current alcohol use. She reports that she does not use drugs.    Review of Systems -   10 point Review of systems is negative except for; as mentioned above in HPI and PMHx    /64   Wt 64.4 kg (142 lb)   BMI 26.83 kg/m    Body mass index is 26.83 kg/m .    EXAM:  GENERAL: Well developed female  HEENT: EOMI, Anicteric Sclera, Moist Mucous Membranes,  In Mouth  the pt does not have redness or bleeding gums  CARDIOVASCULAR: RRR w/out murmur   CHEST/LUNG: Clear to Auscultation  ABDOMEN:  Mildly tender to palpation just above the umbilicus, +BS, No palpable hernia.  MUSCULOSKELETAL:  No deformities with good range of motion in all extremities  NEURO: She is ambulatory with good strength in both legs.  HEME/LYMPH: No Cervical Adenopathy or tenderness.     IMAGES:  I evaluated these images myself and her CT from June 2023 does not show any hernia's.  There is a loop of colon that comes in close approximation to where I believe the mesh is located.      Assessment/Plan:  Abdominal pain in a person who had a prior ventral hernia.  The pain seems to be worse with eating.  No new hernia seen .  I would be helpful to see an updated CT scan to help assess the source of this pain.      AB/ Pelvis CT with contrast.      Josue Love MD  [facility]  [phone]       Again, thank you for allowing me to participate in the care of your patient.        Sincerely,        Josue Love MD

## 2024-05-22 ENCOUNTER — HOSPITAL ENCOUNTER (OUTPATIENT)
Dept: CT IMAGING | Facility: HOSPITAL | Age: 74
Discharge: HOME | End: 2024-05-22
Attending: SURGERY | Admitting: SURGERY
Payer: COMMERCIAL

## 2024-05-22 DIAGNOSIS — R10.13 ABDOMINAL PAIN, EPIGASTRIC: ICD-10-CM

## 2024-05-22 LAB
CREAT BLD-MCNC: 1.4 MG/DL (ref 0.6–1.1)
EGFRCR SERPLBLD CKD-EPI 2021: 39 ML/MIN/1.73M2

## 2024-05-22 PROCEDURE — 74177 CT ABD & PELVIS W/CONTRAST: CPT

## 2024-05-22 PROCEDURE — 250N000009 HC RX 250: Performed by: SURGERY

## 2024-05-22 PROCEDURE — 250N000011 HC RX IP 250 OP 636: Performed by: SURGERY

## 2024-05-22 PROCEDURE — 82565 ASSAY OF CREATININE: CPT

## 2024-05-22 RX ORDER — IOPAMIDOL 755 MG/ML
69 INJECTION, SOLUTION INTRAVASCULAR ONCE
Status: COMPLETED | OUTPATIENT
Start: 2024-05-22 | End: 2024-05-22

## 2024-05-22 RX ADMIN — IOPAMIDOL 69 ML: 755 INJECTION, SOLUTION INTRAVENOUS at 15:18

## 2024-05-22 RX ADMIN — SODIUM CHLORIDE 67 ML: 9 INJECTION, SOLUTION INTRAVENOUS at 15:25

## 2024-05-23 ENCOUNTER — APPOINTMENT (OUTPATIENT)
Dept: RADIOLOGY | Facility: HOSPITAL | Age: 74
End: 2024-05-23
Attending: EMERGENCY MEDICINE
Payer: COMMERCIAL

## 2024-05-23 ENCOUNTER — APPOINTMENT (OUTPATIENT)
Dept: CT IMAGING | Facility: HOSPITAL | Age: 74
End: 2024-05-23
Attending: EMERGENCY MEDICINE
Payer: COMMERCIAL

## 2024-05-23 ENCOUNTER — HOSPITAL ENCOUNTER (OUTPATIENT)
Facility: CLINIC | Age: 74
Setting detail: OBSERVATION
Discharge: HOME | End: 2024-05-24
Attending: FAMILY MEDICINE | Admitting: FAMILY MEDICINE
Payer: COMMERCIAL

## 2024-05-23 ENCOUNTER — HOSPITAL ENCOUNTER (EMERGENCY)
Facility: HOSPITAL | Age: 74
Discharge: ANOTHER HOSPITAL | End: 2024-05-23
Attending: FAMILY MEDICINE | Admitting: FAMILY MEDICINE
Payer: COMMERCIAL

## 2024-05-23 VITALS
OXYGEN SATURATION: 94 % | HEART RATE: 89 BPM | DIASTOLIC BLOOD PRESSURE: 83 MMHG | RESPIRATION RATE: 15 BRPM | WEIGHT: 139 LBS | BODY MASS INDEX: 26.26 KG/M2 | SYSTOLIC BLOOD PRESSURE: 131 MMHG | TEMPERATURE: 98 F

## 2024-05-23 DIAGNOSIS — J96.01 ACUTE RESPIRATORY FAILURE WITH HYPOXIA (H): ICD-10-CM

## 2024-05-23 DIAGNOSIS — S42.215A CLOSED NONDISPLACED FRACTURE OF SURGICAL NECK OF LEFT HUMERUS, UNSPECIFIED FRACTURE MORPHOLOGY, INITIAL ENCOUNTER: ICD-10-CM

## 2024-05-23 DIAGNOSIS — S42.214A CLOSED NONDISPLACED FRACTURE OF SURGICAL NECK OF RIGHT HUMERUS, UNSPECIFIED FRACTURE MORPHOLOGY, INITIAL ENCOUNTER: Primary | ICD-10-CM
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
## 2024-05-23 DIAGNOSIS — K29.70 GASTRITIS WITHOUT BLEEDING, UNSPECIFIED CHRONICITY, UNSPECIFIED GASTRITIS TYPE: ICD-10-CM

## 2024-05-23 PROBLEM — S42.213A HUMERUS SURGICAL NECK FRACTURE: Status: ACTIVE | Noted: 2024-05-23

## 2024-05-23 LAB
ALBUMIN UR-MCNC: NEGATIVE MG/DL
ANION GAP SERPL CALCULATED.3IONS-SCNC: 15 MMOL/L (ref 7–15)
APPEARANCE UR: CLEAR
BASE EXCESS BLDV CALC-SCNC: -13.1 MMOL/L (ref -3–3)
BASE EXCESS BLDV CALC-SCNC: -2.5 MMOL/L (ref -3–3)
BASOPHILS # BLD AUTO: 0 10E3/UL (ref 0–0.2)
BASOPHILS NFR BLD AUTO: 0 %
BILIRUB UR QL STRIP: NEGATIVE
BUN SERPL-MCNC: 23.4 MG/DL (ref 8–23)
CALCIUM SERPL-MCNC: 9.4 MG/DL (ref 8.8–10.2)
CHLORIDE SERPL-SCNC: 105 MMOL/L (ref 98–107)
COLOR UR AUTO: COLORLESS
CREAT SERPL-MCNC: 1.12 MG/DL (ref 0.51–0.95)
DEPRECATED HCO3 PLAS-SCNC: 21 MMOL/L (ref 22–29)
EGFRCR SERPLBLD CKD-EPI 2021: 51 ML/MIN/1.73M2
EOSINOPHIL # BLD AUTO: 0 10E3/UL (ref 0–0.7)
EOSINOPHIL NFR BLD AUTO: 0 %
ERYTHROCYTE [DISTWIDTH] IN BLOOD BY AUTOMATED COUNT: 14.2 % (ref 10–15)
GLUCOSE SERPL-MCNC: 120 MG/DL (ref 70–99)
GLUCOSE UR STRIP-MCNC: NEGATIVE MG/DL
HCO3 BLDV-SCNC: 11 MMOL/L (ref 21–28)
HCO3 BLDV-SCNC: 21 MMOL/L (ref 21–28)
HCT VFR BLD AUTO: 35.1 % (ref 35–47)
HGB BLD-MCNC: 12 G/DL (ref 11.7–15.7)
HGB UR QL STRIP: NEGATIVE
IMM GRANULOCYTES # BLD: 0.1 10E3/UL
IMM GRANULOCYTES NFR BLD: 1 %
KETONES UR STRIP-MCNC: NEGATIVE MG/DL
LACTATE SERPL-SCNC: 1.3 MMOL/L (ref 0.7–2)
LACTATE SERPL-SCNC: 3.5 MMOL/L (ref 0.7–2)
LEUKOCYTE ESTERASE UR QL STRIP: NEGATIVE
LYMPHOCYTES # BLD AUTO: 1 10E3/UL (ref 0.8–5.3)
LYMPHOCYTES NFR BLD AUTO: 9 %
MCH RBC QN AUTO: 28.8 PG (ref 26.5–33)
MCHC RBC AUTO-ENTMCNC: 34.2 G/DL (ref 31.5–36.5)
MCV RBC AUTO: 84 FL (ref 78–100)
MONOCYTES # BLD AUTO: 1.1 10E3/UL (ref 0–1.3)
MONOCYTES NFR BLD AUTO: 9 %
MUCOUS THREADS #/AREA URNS LPF: PRESENT /LPF
NEUTROPHILS # BLD AUTO: 9.7 10E3/UL (ref 1.6–8.3)
NEUTROPHILS NFR BLD AUTO: 81 %
NITRATE UR QL: NEGATIVE
NRBC # BLD AUTO: 0 10E3/UL
NRBC BLD AUTO-RTO: 0 /100
NT-PROBNP SERPL-MCNC: 307 PG/ML (ref 0–900)
O2/TOTAL GAS SETTING VFR VENT: 21 %
O2/TOTAL GAS SETTING VFR VENT: 28 %
OXYHGB MFR BLDV: 64 % (ref 70–75)
OXYHGB MFR BLDV: 71 % (ref 70–75)
PCO2 BLDV: 15 MM HG (ref 40–50)
PCO2 BLDV: 30 MM HG (ref 40–50)
PH BLDV: 7.46 [PH] (ref 7.32–7.43)
PH BLDV: 7.46 [PH] (ref 7.32–7.43)
PH UR STRIP: 7.5 [PH] (ref 5–7)
PLATELET # BLD AUTO: 245 10E3/UL (ref 150–450)
PO2 BLDV: 32 MM HG (ref 25–47)
PO2 BLDV: 37 MM HG (ref 25–47)
POTASSIUM SERPL-SCNC: 4.1 MMOL/L (ref 3.4–5.3)
RBC # BLD AUTO: 4.17 10E6/UL (ref 3.8–5.2)
RBC URINE: 0 /HPF
SALICYLATES SERPL-MCNC: <0.3 MG/DL
SAO2 % BLDV: 65.5 % (ref 70–75)
SAO2 % BLDV: 71.8 % (ref 70–75)
SODIUM SERPL-SCNC: 141 MMOL/L (ref 135–145)
SP GR UR STRIP: 1.02 (ref 1–1.03)
SQUAMOUS EPITHELIAL: <1 /HPF
TROPONIN T SERPL HS-MCNC: 17 NG/L
TROPONIN T SERPL HS-MCNC: 19 NG/L
UROBILINOGEN UR STRIP-MCNC: <2 MG/DL
WBC # BLD AUTO: 11.9 10E3/UL (ref 4–11)
WBC URINE: 0 /HPF

## 2024-05-23 PROCEDURE — 99223 1ST HOSP IP/OBS HIGH 75: CPT

## 2024-05-23 PROCEDURE — 250N000013 HC RX MED GY IP 250 OP 250 PS 637: Performed by: EMERGENCY MEDICINE

## 2024-05-23 PROCEDURE — 250N000013 HC RX MED GY IP 250 OP 250 PS 637

## 2024-05-23 PROCEDURE — 83605 ASSAY OF LACTIC ACID: CPT | Mod: 91 | Performed by: EMERGENCY MEDICINE

## 2024-05-23 PROCEDURE — 96366 THER/PROPH/DIAG IV INF ADDON: CPT

## 2024-05-23 PROCEDURE — 80048 BASIC METABOLIC PNL TOTAL CA: CPT | Performed by: EMERGENCY MEDICINE

## 2024-05-23 PROCEDURE — 84484 ASSAY OF TROPONIN QUANT: CPT | Performed by: EMERGENCY MEDICINE

## 2024-05-23 PROCEDURE — 83605 ASSAY OF LACTIC ACID: CPT | Performed by: EMERGENCY MEDICINE

## 2024-05-23 PROCEDURE — 96367 TX/PROPH/DG ADDL SEQ IV INF: CPT

## 2024-05-23 PROCEDURE — 71045 X-RAY EXAM CHEST 1 VIEW: CPT

## 2024-05-23 PROCEDURE — G0379 DIRECT REFER HOSPITAL OBSERV: HCPCS

## 2024-05-23 PROCEDURE — 93005 ELECTROCARDIOGRAM TRACING: CPT | Performed by: EMERGENCY MEDICINE

## 2024-05-23 PROCEDURE — 87040 BLOOD CULTURE FOR BACTERIA: CPT | Mod: XS | Performed by: EMERGENCY MEDICINE

## 2024-05-23 PROCEDURE — 999N000157 HC STATISTIC RCP TIME EA 10 MIN

## 2024-05-23 PROCEDURE — 74177 CT ABD & PELVIS W/CONTRAST: CPT

## 2024-05-23 PROCEDURE — 250N000011 HC RX IP 250 OP 636: Performed by: EMERGENCY MEDICINE

## 2024-05-23 PROCEDURE — 71275 CT ANGIOGRAPHY CHEST: CPT

## 2024-05-23 PROCEDURE — 82805 BLOOD GASES W/O2 SATURATION: CPT | Performed by: EMERGENCY MEDICINE

## 2024-05-23 PROCEDURE — 36415 COLL VENOUS BLD VENIPUNCTURE: CPT | Performed by: EMERGENCY MEDICINE

## 2024-05-23 PROCEDURE — 96365 THER/PROPH/DIAG IV INF INIT: CPT | Mod: 59

## 2024-05-23 PROCEDURE — G0378 HOSPITAL OBSERVATION PER HR: HCPCS

## 2024-05-23 PROCEDURE — 250N000009 HC RX 250: Performed by: EMERGENCY MEDICINE

## 2024-05-23 PROCEDURE — 83880 ASSAY OF NATRIURETIC PEPTIDE: CPT | Performed by: EMERGENCY MEDICINE

## 2024-05-23 PROCEDURE — 81001 URINALYSIS AUTO W/SCOPE: CPT | Performed by: EMERGENCY MEDICINE

## 2024-05-23 PROCEDURE — 96375 TX/PRO/DX INJ NEW DRUG ADDON: CPT

## 2024-05-23 PROCEDURE — 258N000003 HC RX IP 258 OP 636: Performed by: EMERGENCY MEDICINE

## 2024-05-23 PROCEDURE — 73030 X-RAY EXAM OF SHOULDER: CPT | Mod: LT

## 2024-05-23 PROCEDURE — 96361 HYDRATE IV INFUSION ADD-ON: CPT

## 2024-05-23 PROCEDURE — 94640 AIRWAY INHALATION TREATMENT: CPT

## 2024-05-23 PROCEDURE — 99285 EMERGENCY DEPT VISIT HI MDM: CPT | Mod: 25

## 2024-05-23 PROCEDURE — 73060 X-RAY EXAM OF HUMERUS: CPT | Mod: LT

## 2024-05-23 PROCEDURE — 85025 COMPLETE CBC W/AUTO DIFF WBC: CPT | Performed by: EMERGENCY MEDICINE

## 2024-05-23 PROCEDURE — C9113 INJ PANTOPRAZOLE SODIUM, VIA: HCPCS | Performed by: EMERGENCY MEDICINE

## 2024-05-23 PROCEDURE — 96376 TX/PRO/DX INJ SAME DRUG ADON: CPT

## 2024-05-23 PROCEDURE — 80179 DRUG ASSAY SALICYLATE: CPT | Performed by: EMERGENCY MEDICINE

## 2024-05-23 RX ORDER — LISINOPRIL AND HYDROCHLOROTHIAZIDE 12.5; 2 MG/1; MG/1
1 TABLET ORAL DAILY
Status: DISCONTINUED | OUTPATIENT
Start: 2024-05-24 | End: 2024-05-24 | Stop reason: HOSPADM

## 2024-05-23 RX ORDER — IOPAMIDOL 755 MG/ML
75 INJECTION, SOLUTION INTRAVASCULAR ONCE
Status: COMPLETED | OUTPATIENT
Start: 2024-05-23 | End: 2024-05-23

## 2024-05-23 RX ORDER — NALOXONE HYDROCHLORIDE 0.4 MG/ML
0.4 INJECTION, SOLUTION INTRAMUSCULAR; INTRAVENOUS; SUBCUTANEOUS
Status: DISCONTINUED | OUTPATIENT
Start: 2024-05-23 | End: 2024-05-24 | Stop reason: HOSPADM

## 2024-05-23 RX ORDER — NALOXONE HYDROCHLORIDE 0.4 MG/ML
0.2 INJECTION, SOLUTION INTRAMUSCULAR; INTRAVENOUS; SUBCUTANEOUS
Status: DISCONTINUED | OUTPATIENT
Start: 2024-05-23 | End: 2024-05-24 | Stop reason: HOSPADM

## 2024-05-23 RX ORDER — METRONIDAZOLE 500 MG/100ML
500 INJECTION, SOLUTION INTRAVENOUS ONCE
Status: COMPLETED | OUTPATIENT
Start: 2024-05-23 | End: 2024-05-23

## 2024-05-23 RX ORDER — ONDANSETRON 4 MG/1
4 TABLET, ORALLY DISINTEGRATING ORAL EVERY 6 HOURS PRN
Status: DISCONTINUED | OUTPATIENT
Start: 2024-05-23 | End: 2024-05-24 | Stop reason: HOSPADM

## 2024-05-23 RX ORDER — ONDANSETRON 2 MG/ML
4 INJECTION INTRAMUSCULAR; INTRAVENOUS ONCE
Status: COMPLETED | OUTPATIENT
Start: 2024-05-23 | End: 2024-05-23

## 2024-05-23 RX ORDER — LEVOTHYROXINE SODIUM 100 UG/1
100 TABLET ORAL
Status: DISCONTINUED | OUTPATIENT
Start: 2024-05-24 | End: 2024-05-24 | Stop reason: HOSPADM

## 2024-05-23 RX ORDER — METHYLPREDNISOLONE SODIUM SUCCINATE 125 MG/2ML
125 INJECTION, POWDER, LYOPHILIZED, FOR SOLUTION INTRAMUSCULAR; INTRAVENOUS ONCE
Status: COMPLETED | OUTPATIENT
Start: 2024-05-23 | End: 2024-05-23

## 2024-05-23 RX ORDER — AMOXICILLIN 250 MG
2 CAPSULE ORAL 2 TIMES DAILY
Status: DISCONTINUED | OUTPATIENT
Start: 2024-05-23 | End: 2024-05-24 | Stop reason: HOSPADM

## 2024-05-23 RX ORDER — IPRATROPIUM BROMIDE AND ALBUTEROL SULFATE 2.5; .5 MG/3ML; MG/3ML
3 SOLUTION RESPIRATORY (INHALATION) ONCE
Status: COMPLETED | OUTPATIENT
Start: 2024-05-23 | End: 2024-05-23

## 2024-05-23 RX ORDER — CEFEPIME HYDROCHLORIDE 1 G/1
1 INJECTION, POWDER, FOR SOLUTION INTRAMUSCULAR; INTRAVENOUS ONCE
Status: COMPLETED | OUTPATIENT
Start: 2024-05-23 | End: 2024-05-23

## 2024-05-23 RX ORDER — AMOXICILLIN 250 MG
1 CAPSULE ORAL 2 TIMES DAILY
Status: DISCONTINUED | OUTPATIENT
Start: 2024-05-23 | End: 2024-05-24 | Stop reason: HOSPADM

## 2024-05-23 RX ORDER — NAPROXEN 250 MG/1
250 TABLET ORAL
Status: DISCONTINUED | OUTPATIENT
Start: 2024-05-24 | End: 2024-05-24 | Stop reason: HOSPADM

## 2024-05-23 RX ORDER — PANTOPRAZOLE SODIUM 40 MG/1
40 TABLET, DELAYED RELEASE ORAL
Status: DISCONTINUED | OUTPATIENT
Start: 2024-05-24 | End: 2024-05-24 | Stop reason: HOSPADM

## 2024-05-23 RX ORDER — OXYCODONE HYDROCHLORIDE 5 MG/1
5 TABLET ORAL ONCE
Status: COMPLETED | OUTPATIENT
Start: 2024-05-23 | End: 2024-05-23

## 2024-05-23 RX ORDER — OXYCODONE HYDROCHLORIDE 5 MG/1
5 TABLET ORAL EVERY 4 HOURS PRN
Status: DISCONTINUED | OUTPATIENT
Start: 2024-05-23 | End: 2024-05-24 | Stop reason: HOSPADM

## 2024-05-23 RX ORDER — CARBOXYMETHYLCELLULOSE SODIUM 5 MG/ML
1 SOLUTION/ DROPS OPHTHALMIC
Status: DISCONTINUED | OUTPATIENT
Start: 2024-05-23 | End: 2024-05-24 | Stop reason: HOSPADM

## 2024-05-23 RX ORDER — HYDROMORPHONE HYDROCHLORIDE 1 MG/ML
0.5 INJECTION, SOLUTION INTRAMUSCULAR; INTRAVENOUS; SUBCUTANEOUS ONCE
Status: COMPLETED | OUTPATIENT
Start: 2024-05-23 | End: 2024-05-23

## 2024-05-23 RX ORDER — ACETAMINOPHEN 325 MG/1
975 TABLET ORAL EVERY 6 HOURS
Status: DISCONTINUED | OUTPATIENT
Start: 2024-05-23 | End: 2024-05-24 | Stop reason: HOSPADM

## 2024-05-23 RX ORDER — PRAVASTATIN SODIUM 20 MG
40 TABLET ORAL AT BEDTIME
Status: DISCONTINUED | OUTPATIENT
Start: 2024-05-23 | End: 2024-05-24 | Stop reason: HOSPADM

## 2024-05-23 RX ORDER — AMOXICILLIN 250 MG
1 CAPSULE ORAL 2 TIMES DAILY PRN
Status: DISCONTINUED | OUTPATIENT
Start: 2024-05-23 | End: 2024-05-24 | Stop reason: HOSPADM

## 2024-05-23 RX ORDER — AMOXICILLIN 250 MG
2 CAPSULE ORAL 2 TIMES DAILY PRN
Status: DISCONTINUED | OUTPATIENT
Start: 2024-05-23 | End: 2024-05-24 | Stop reason: HOSPADM

## 2024-05-23 RX ORDER — ASPIRIN 81 MG/1
81 TABLET ORAL DAILY
Status: DISCONTINUED | OUTPATIENT
Start: 2024-05-24 | End: 2024-05-24 | Stop reason: HOSPADM

## 2024-05-23 RX ORDER — ONDANSETRON 2 MG/ML
4 INJECTION INTRAMUSCULAR; INTRAVENOUS EVERY 6 HOURS PRN
Status: DISCONTINUED | OUTPATIENT
Start: 2024-05-23 | End: 2024-05-24 | Stop reason: HOSPADM

## 2024-05-23 RX ORDER — OXYCODONE AND ACETAMINOPHEN 5; 325 MG/1; MG/1
1 TABLET ORAL ONCE
Status: COMPLETED | OUTPATIENT
Start: 2024-05-23 | End: 2024-05-23

## 2024-05-23 RX ADMIN — HYDROMORPHONE HYDROCHLORIDE 0.5 MG: 1 INJECTION, SOLUTION INTRAMUSCULAR; INTRAVENOUS; SUBCUTANEOUS at 10:35

## 2024-05-23 RX ADMIN — METRONIDAZOLE 500 MG: 500 INJECTION, SOLUTION INTRAVENOUS at 11:38

## 2024-05-23 RX ADMIN — OXYCODONE HYDROCHLORIDE 5 MG: 5 TABLET ORAL at 14:08

## 2024-05-23 RX ADMIN — CEFEPIME HYDROCHLORIDE 1 G: 1 INJECTION, POWDER, FOR SOLUTION INTRAMUSCULAR; INTRAVENOUS at 10:41

## 2024-05-23 RX ADMIN — PRAVASTATIN SODIUM 40 MG: 20 TABLET ORAL at 21:51

## 2024-05-23 RX ADMIN — OXYCODONE HYDROCHLORIDE AND ACETAMINOPHEN 1 TABLET: 5; 325 TABLET ORAL at 18:54

## 2024-05-23 RX ADMIN — HYDROMORPHONE HYDROCHLORIDE 0.5 MG: 1 INJECTION, SOLUTION INTRAMUSCULAR; INTRAVENOUS; SUBCUTANEOUS at 09:18

## 2024-05-23 RX ADMIN — ACETAMINOPHEN 975 MG: 325 TABLET, FILM COATED ORAL at 21:51

## 2024-05-23 RX ADMIN — IPRATROPIUM BROMIDE AND ALBUTEROL SULFATE 3 ML: .5; 3 SOLUTION RESPIRATORY (INHALATION) at 11:40

## 2024-05-23 RX ADMIN — METHYLPREDNISOLONE SODIUM SUCCINATE 125 MG: 125 INJECTION, POWDER, FOR SOLUTION INTRAMUSCULAR; INTRAVENOUS at 11:37

## 2024-05-23 RX ADMIN — IOPAMIDOL 75 ML: 755 INJECTION, SOLUTION INTRAVENOUS at 10:32

## 2024-05-23 RX ADMIN — SODIUM CHLORIDE 1893 ML: 9 INJECTION, SOLUTION INTRAVENOUS at 09:20

## 2024-05-23 RX ADMIN — ONDANSETRON 4 MG: 2 INJECTION INTRAMUSCULAR; INTRAVENOUS at 13:24

## 2024-05-23 RX ADMIN — PANTOPRAZOLE SODIUM 40 MG: 40 INJECTION, POWDER, FOR SOLUTION INTRAVENOUS at 11:34

## 2024-05-23 ASSESSMENT — ENCOUNTER SYMPTOMS
WHEEZING: 0
BLOOD IN STOOL: 0
FEVER: 0
LOSS OF CONSCIOUSNESS: 0
SHORTNESS OF BREATH: 1
DIZZINESS: 0
CHILLS: 0
ORTHOPNEA: 0
NAUSEA: 0
COUGH: 0
PALPITATIONS: 0
ABDOMINAL PAIN: 1
VOMITING: 0
DIARRHEA: 1
HEADACHES: 0

## 2024-05-23 ASSESSMENT — ACTIVITIES OF DAILY LIVING (ADL)
ADLS_ACUITY_SCORE: 35
ADLS_ACUITY_SCORE: 35
ADLS_ACUITY_SCORE: 20
ADLS_ACUITY_SCORE: 35
ADLS_ACUITY_SCORE: 20
ADLS_ACUITY_SCORE: 35
ADLS_ACUITY_SCORE: 20
ADLS_ACUITY_SCORE: 35

## 2024-05-23 ASSESSMENT — COLUMBIA-SUICIDE SEVERITY RATING SCALE - C-SSRS
6. HAVE YOU EVER DONE ANYTHING, STARTED TO DO ANYTHING, OR PREPARED TO DO ANYTHING TO END YOUR LIFE?: NO
2. HAVE YOU ACTUALLY HAD ANY THOUGHTS OF KILLING YOURSELF IN THE PAST MONTH?: NO
1. IN THE PAST MONTH, HAVE YOU WISHED YOU WERE DEAD OR WISHED YOU COULD GO TO SLEEP AND NOT WAKE UP?: NO

## 2024-05-23 NOTE — H&P
[facility]    History and Physical  Hospital Medicine       Date of Admission:  (Not on file)  Date of Service: 5/23/2024     Assessment & Plan   Mary Martino is a 74 year old female who presents on 5/23/24 with left humerus fracture secondary to ground level fall. She is being admitted for pain control.    Acute fracture of left humerus secondary to ground level fall    XR showing acute mildly displaced fracture of the surgical neck of left humerus. Sling was placed in emergency department and   - Orthopedic consultation  - Pain management with acetaminophen 975 mg q6hrs and oxycodone 2.5 - 5.0 mg q4hrs PRN.    Acute respiratory failure with hypoxia (resolved)    Presented with labored breathing and tachypnea. Noted to be hypoxia and placed on 2 liters supplemental oxygen. Possibly related to fentanyl administered by EMS. On arrival to [facility] oxygenating in the lower 90's on RA. Patient correlated changes in breathing with pain secondary to arm movement. Primary respiratory alkalosis as evidenced by VBG with alkalosis (7.46), CO2 15, and bicarb 11. CT PE showing no evidence for pulmonary emboli or dissections. There is evidence of advanced emphysema. Afebrile, however with mild leukocytosis (11.9). Denies cough or URI symptoms. Low suspicion for infectious etiology. At this time favoring pain as trigger for acute change in respiratory status.  - Trend leukocytosis with AM CBC  - Supplemental oxygen PRN    Elevated lactate (resolved)    Lactate elevated 3.5 with normalization on repeat 1.3.    Leukocytosis    Mildly elevated (11.9) on presentation. No source for infection identified on CXR or urinalysis. Low suspicion for infection at this time. Likely secondary to physical stress from humerus fracture.  - Trend with CBC in AM    Gastritis, possible    Recent evaluation for abdominal pain by general surgery. No abdominal pain or tenderness to palpation on exam. CT abdomen showing  generalized thickening of the stomach similar to recent CT 5/22, possibly secondary to gastritis. Started on cefepime and metronidazole for possible intraabdominal infection given initial sepsis presentation with isolated reading of hypotension systolicly in the 90's on arrival.  - No further antibiotics  - Protonix 40 mg BID    Chronic kidney disease, stage 3a    Creatine 1.12 on admission with baseline creatine near 1.10. Appears stable.  - Monitor renal function daily    Essential hypertension    Chronic. Blood pressures reviewed, largely controlled. Managed PTA with lisinopril-hydrochlorothiazide 20-12.5 daily.  - Continue lisinopril-hydrochlorothiazide    Hypothyroidism/Graves disease    H/o Graves disease with thyroid ablation. Managed PTA with levothyroxine 100 mcg daily.  - Continue levothyroxine    Hyperlipidemia    Chronic. Managed PTA with pravastatin 40 mg daily.  - Continue pravastatin    Gastroesophageal reflux disease    Chronic. Managed PTA with omeprazole 40 mg daily.  - Continue omeprazole.    Clinically Significant Risk Factors Present on Admission   Diet: Regular Diet Adult  DVT Prophylaxis: Low Risk/Ambulatory with no VTE prophylaxis indicated  Agrawal Catheter: Not present  Code Status: Full Code  Lines: PIV    Disposition Plan   Medically Ready for Discharge: Anticipated Tomorrow    I have discussed patient and formulated plan with attending hospitalist physician, Dr. Ritchie.  VANDANA DIANA PA-C        Primary Care Physician   Misha Alonzo [phone]    History is obtained from the patient, emergency department physician, and review of old records via the EMR.    History of Present Illness   Mary Martino is a 74 year old female with past medical history of hypertension, hyperlipidemia, chronic kidney disease, hypothyroidism, GERD, now presents on 5/23 with left arm pain secondary to ground level fall.    Mary states that she developed left arm pain after sustaining a ground level  fall at home in the kitchen earlier today. Her fall sounds mechanical, as she describes tripper over her husbands feet. She denies lightheadedness or dizziness surrounding fall. Denies head injury or loss of consciousness. Not on anticoagulation. She was brought to the emergency department for evaluation in which XR left arm showing humerus fracture. On arrival to the emergency department she was notably with labored breathing and tachypnea. She denies prior difficulty breathing and attributes this to the pain in her arm. Also endorses ongoing abdominal pain with recent evaluation by general surgery in clinic and possible mesh component as source of pain. Outpatient CT abdomen showing similar prior ventral hernia repair with mesh. There is inferior to the mesh a similar small fat-containing ventral hernia. There is diffuse circumferential wall thickening in the stomach which is nonspecific. History of x 4 hernia repairs. Does not have active abdominal pains. She associated her pain with eating and states she has had nothing to eat today. Has had her gallbladder removed. Her stools have been loose, however this is intermittent. Denies nausea or vomiting. She takes an Aleve daily and also diluted vinegar which she states helps with her pains.     Review of Systems   Review of Systems   Constitutional:  Negative for chills and fever.   Respiratory:  Positive for shortness of breath. Negative for cough and wheezing.    Cardiovascular:  Negative for chest pain, palpitations, orthopnea and leg swelling.   Gastrointestinal:  Positive for abdominal pain and diarrhea. Negative for blood in stool, melena, nausea and vomiting.   Neurological:  Negative for dizziness, loss of consciousness and headaches.     Past Medical History    Past Medical History:   Diagnosis Date    Depression     GERD (gastroesophageal reflux disease)     Hyperlipidemia     Hypertension     Hypothyroidism     h/o grave's disease    Leiomyoma of uterus      PONV (postoperative nausea and vomiting)     Seasonal allergies     Ventral hernia      Past Surgical History   Past Surgical History:   Procedure Laterality Date    APPENDECTOMY      CHOLECYSTECTOMY      COLON SURGERY  2009    partial colon resection    EYE SURGERY      for proptosis    HEMIARTHROPLASTY SHOULDER FRACTURE Right     HERNIA REPAIR      HERNIORRHAPHY VENTRAL N/A 3/13/2018    Procedure: VENTRAL HERNIA REPAIR;  Surgeon: Josue Love MD;  Location: [facility];  Service:     HUMERUS FRACTURE SURGERY      HYSTERECTOMY Bilateral 3/13/2018    Procedure: EXPLORATORY LAPAROTOMY TOTAL ABDOMINAL HYSTERECTOMY, BILATERAL SALPINGO OOPHORECTOMY PELVIC WASHINGS LYSIS OF ADHESIONS;  Surgeon: Sheri Zamarripa MD;  Location: [facility];  Service:     THYROID SURGERY      ablation    TUBAL LIGATION        Prior to Admission Medications   Prior to Admission Medications   Prescriptions Last Dose Informant Patient Reported? Taking?   Flaxseed, Linseed, (FLAX SEED OIL) 1000 MG capsule 5/22/2024 at am  Yes Yes   Sig: Take 1 capsule by mouth daily   aspirin 81 MG EC tablet 5/22/2024 at PM  Yes Yes   Sig: [ASPIRIN 81 MG EC TABLET] Take 81 mg by mouth daily.   betamethasone valerate (VALISONE) 0.1 % cream 5/22/2024 at PM  Yes Yes   Sig: [BETAMETHASONE VALERATE (VALISONE) 0.1 % CREAM] Apply 1 application topically 2 (two) times a day as needed.    calcium carbonate (OS-VADIM) 600 mg calcium (1,500 mg) tablet 5/22/2024 at pm  Yes Yes   Sig: [CALCIUM CARBONATE (OS-VADIM) 600 MG CALCIUM (1,500 MG) TABLET] Take 600 mg by mouth daily.    cholecalciferol, vitamin D3, 1,000 unit tablet 5/23/2024 at am  Yes Yes   Sig: [CHOLECALCIFEROL, VITAMIN D3, 1,000 UNIT TABLET] Take 1,000 Units by mouth daily.    levothyroxine (SYNTHROID/LEVOTHROID) 100 MCG tablet 5/23/2024 at am  Yes Yes   Sig: Take 100 mcg by mouth daily   lisinopril-hydrochlorothiazide (ZESTORETIC) 20-12.5 MG tablet 5/23/2024 at am  Yes Yes   Sig: Take 1 tablet  by mouth daily   naproxen sodium (ALEVE) 220 MG tablet 2024 at am  Yes Yes   Sig: Take 220 mg by mouth daily (with breakfast)   omeprazole (PRILOSEC) 40 MG capsule 2024 at pm  Yes Yes   Sig: [OMEPRAZOLE (PRILOSEC) 40 MG CAPSULE] Take 40 mg by mouth daily.    pravastatin (PRAVACHOL) 40 MG tablet 2024 at pm  Yes Yes   Sig: [PRAVASTATIN (PRAVACHOL) 40 MG TABLET] Take 40 mg by mouth at bedtime.       Facility-Administered Medications: None     Allergies   Allergies   Allergen Reactions    Amlodipine Dizziness    Codeine Nausea and Vomiting    Penicillins Hives     Occurred during childhood, Other reaction(s): *Unknown - Childhood Rxn, tolerates cephalosporins w/o problems     Family History    Family History   Problem Relation Age of Onset    Cancer Father 76.00        brain    Breast Cancer No family hx of      Social History   Social History     Socioeconomic History    Marital status:      Spouse name: Not on file    Number of children: Not on file    Years of education: Not on file    Highest education level: Not on file   Occupational History    Not on file   Tobacco Use    Smoking status: Former     Current packs/day: 0.00     Types: Cigarettes     Quit date: 2000     Years since quittin.4    Smokeless tobacco: Never   Substance and Sexual Activity    Alcohol use: Yes     Comment: Alcoholic Drinks/day: 1 drink a month     Drug use: No    Sexual activity: Not on file   Other Topics Concern    Not on file   Social History Narrative    Not on file     Social Determinants of Health     Financial Resource Strain: Not on file   Food Insecurity: Not on file   Transportation Needs: Not on file   Physical Activity: Not on file   Stress: Not on file   Social Connections: Not on file   Interpersonal Safety: Not on file   Housing Stability: Not on file     Physical Exam   BP (!) 158/95 (BP Location: Right arm)   Pulse 101   Temp 97.9  F (36.6  C) (Oral)   Resp 16   SpO2 96%      Weight: 0  lbs 0 oz There is no height or weight on file to calculate BMI.     Constitutional: Laying in bed comfortably, elderly female, appears stated age, alert, oriented x 4, cooperative, no apparent distress, appears nontoxic.  Eyes: Eyes are clear, pupils are reactive.  HENT: Oropharynx is clear and moist. No evidence of cranial trauma.  Cardiovascular: Regular rate and rhythm, normal S1 and S2, and no murmur noted. Strong and regular radial pulse. No lower extremity edema bilaterally.  Respiratory: Clear to auscultation bilaterally. No wheezes, rhonchi, or crackles. Normal respiratory effort on RA with oxygenation in the lowe 90's. Speaking in full sentences. Not in respiratory distress.   GI: Soft, flat, non-tender to palpation throughout, normal bowel sounds, no hepatomegaly.  Genitourinary: Deferred  Musculoskeletal: Normal muscle bulk and tone. Left arm in sling. No ecchymosis or obvious deformity. Obvious pain with movement of left arm.  Skin: Warm and dry, no rashes.   Neurologic: Neck supple. Cranial nerves are grossly intact.  is symmetric.     Data   Data reviewed today:     I have personally reviewed the following data over the past 24 hrs:    11.9 (H)  \   12.0   / 245     141 105 23.4 (H) /  120 (H)   4.1 21 (L) 1.12 (H) \     Trop: 17 (H) BNP: 307     Procal: N/A CRP: N/A Lactic Acid: 1.3         Recent Results (from the past 24 hour(s))   Shoulder XR, left  2-3 vw PORTABLE    Narrative    EXAM: XR SHOULDER LEFT PORT G/E 2 VIEWS  LOCATION: [facility]  DATE: 5/23/2024    INDICATION: shoulder pain fall  COMPARISON: None.      Impression    IMPRESSION: Acute mildly displaced fracture of the surgical neck of the humerus. No evidence of additional fractures. Osteopenia. Advanced degenerative changes at the glenohumeral joint.   XR Chest Port 1 View    Narrative    EXAM: XR CHEST PORT 1 VIEW  LOCATION: [facility]  DATE: 5/23/2024    INDICATION: Hypotension.  Shortness of breath. Fall.  COMPARISON: 12/11/2023.      Impression    IMPRESSION: Minimal basilar opacities, likely atelectasis. No significant pleural effusion. No pneumothorax. Stable cardiomediastinal silhouette. No pulmonary edema. Scoliosis. Right shoulder arthroplasty. No obvious acute fracture by radiography.   XR Humerus Port Left G/E 2 Views    Narrative    EXAM: XR HUMERUS PORT LEFT G/E 2 VIEWS  LOCATION: [facility]  DATE: 5/23/2024    INDICATION: Left arm pain. Recent fall.  COMPARISON: None.      Impression    IMPRESSION: Acute mildly displaced fracture of the surgical neck of the humerus. Osteopenia. Advanced degenerative changes at the glenohumeral joint.   CT Chest Pulmonary Embolism w Contrast    Narrative    EXAM: CT CHEST PULMONARY EMBOLISM W CONTRAST  LOCATION: [facility]  DATE: 5/23/2024    INDICATION: tachypnea, transient hypotension, lactic acid 3.5, fall, left arm pain  COMPARISON: None.  TECHNIQUE: CT chest pulmonary angiogram during arterial phase injection of IV contrast. Multiplanar reformats and MIP reconstructions were performed. Dose reduction techniques were used.   CONTRAST: IsoVue 370 75mL    FINDINGS:  ANGIOGRAM CHEST: Enlarged central pulmonary arteries likely due to pulmonary hypertension. Negative for pulmonary emboli. Thoracic aorta is negative for dissection. No CT evidence of right heart strain.    LUNGS AND PLEURA: Advanced emphysema most marked in the lung apices. Lungs otherwise clear.    MEDIASTINUM/AXILLAE: Normal.    CORONARY ARTERY CALCIFICATION: Mild.    UPPER ABDOMEN: Normal.    MUSCULOSKELETAL: No significant findings.      Impression    IMPRESSION:  1.  Negative for pulmonary emboli and negative for dissections.    2.  Enlarged central pulmonary arteries should be due to pulmonary hypertension.    3.  Advanced emphysema.   CT Abdomen Pelvis w Contrast    Narrative    EXAM: CT ABDOMEN PELVIS W CONTRAST  LOCATION:   [facility]  DATE: 5/23/2024    INDICATION: tachypnea, fall, leukcytosis, transient hypotension, lactic acid 3.5, diarrhea  COMPARISON: CT 5/22/2024 and CT 2/15/2018.  TECHNIQUE: CT scan of the abdomen and pelvis was performed following injection of IV contrast. Multiplanar reformats were obtained. Dose reduction techniques were used.  CONTRAST: IsoVue 370 75mL    FINDINGS:   LOWER CHEST: Normal.    HEPATOBILIARY: Normal.    PANCREAS: Normal.    SPLEEN: Normal.    ADRENAL GLANDS: Nodularity in the adrenal glands more prominent on the right unchanged since 2018 can be considered benign adrenal adenomas. No follow-up needed for these.    KIDNEYS/BLADDER: Normal.    BOWEL: Generalized thickening of the stomach similar to previous suggestive of gastritis. Postop change right side of the colon. Moderate diverticulosis in the colon but nothing for acute inflammatory change.    LYMPH NODES: Normal.    VASCULATURE: No aneurysms.    PELVIC ORGANS: Normal.    MUSCULOSKELETAL: Bilateral total hip arthroplasties. No concerning bone lesion.      Impression    IMPRESSION:   1.  Generalized thickening of the stomach similar to recent CT from yesterday. Possibly from some generalized gastritis.    2.  No significant new finding.

## 2024-05-23 NOTE — ED PROVIDER NOTES
EMERGENCY DEPARTMENT ENCOUNTER      NAME: Mary Martino  AGE: 74 year old female  YOB: 1950  MRN: [id number]  EVALUATION DATE & TIME: 5/23/2024  8:24 AM    PCP: Misha Alonzo    ED PROVIDER: Mekhi Manzanares MD      Chief Complaint   Patient presents with    Fall         FINAL IMPRESSION:  1. Closed nondisplaced fracture of surgical neck of left humerus, unspecified fracture morphology, initial encounter    2. Acute respiratory failure with hypoxia (H)    3. Fall, initial encounter    4. Gastritis without bleeding, unspecified chronicity, unspecified gastritis type          ED COURSE & MEDICAL DECISION MAKING:    Pertinent Labs & Imaging studies reviewed. (See chart for details)  74 year old female presents to the Emergency Department for evaluation of fall, left arm injury, and tachypnea    ED Course as of 05/23/24 1632   Thu May 23, 2024   0825 Met with and introduced myself to the patient. Discussed history and plan of care.   0920 Patient presents via EMS with hypotension and tachypnea and left arm injury   0920 Patient reports she tripped injuring her left arm today and medics showed up and gave her 25 mics of fentanyl and patient became hypotensive and quite tachypneic.   0921 Pressure dropped to the 90s.  No urticaria or angioedema.  No wheezing.  With IV fluids blood pressure normalized   0921 Patient is tachypneic on exam.  Patient states she tripped and did not faint.  Has had some diarrhea today.  Denies chest pain or abdominal pain.  Left arm is painful with range of motion   0921 With tachypnea and hypotension will order portable x-ray to evaluate for pneumothorax and while getting x-rays will do left upper shoulder and arm   0921 With fall will do full workup with EKG, CBC, BMP, troponin, proBNP, lactic acid, UA   0922 Lactic Acid(!): 3.5  IV fluids ordered.  Blood cultures ordered.  Will add on stool testing and CTA PE and CT abdomen pelvis   0922 IV fluid boluses ordered sepsis  protocol 30 mg per kilogram   0951 Troponin T, High Sensitivity(!): 19  Will repeat delta troponin   0951 WBC(!): 11.9  UA, blood cultures, CT chest abdomen and pelvis pending   1013 Venous pCO2 15 per lab critical result, CTA PE ordered   1020 Patient has a respiratory alkalosis and what appears to be a metabolic acidosis   1022 With metabolic acidosis and respiratory alkalosis we will add on a salicylate level   1022 Cefepime and Flagyl ordered for possible intra-abdominal process.  Has a known hernia with mesh and sounds like per patient that surgery is worried that her bowel may be attached to her mesh.  Her current abdominal exam is without guarding or rigidity and he does not have evidence of peritonitis.  Doubt mesenteric ischemia   1032 Patient denies any excessive aspirin use.  Does use 81 mg of aspirin a day.  Does use naproxen.  Reports lidocaine lotion but denies using any more lidocaine and lotion that was prescribed.  Denies wintergreen   1041 I independently reviewed chest x-ray and do not appreciate any pneumonia but she does have marked scoliosis   1041 X-ray shows proximal humerus fracture.  Laced in sling.  Given pain meds for pain control   1058 CT shows generalized stomach thickening suggestive of possible gastritis.  Protonix ordered   1058 CT shows advanced emphysema but no pulmonary emboli   1059 No wheezing on exam   1059 But with tachypnea will give Solu-Medrol and DuoNeb   1107 Lactic Acid: 1.3  After IV fluids   1107 Repeat VBG shows marked improvement and CO2 and bicarb   1112 Patient reports former smoker.   1112 Lactic acidosis resolved.   1112 Sling ordered for broken humerus   1131 Troponin T, High Sensitivity(!): 17  Downtrending.  ACS unlikely   1255 Breathing is now improved.  Gas is resolved.  Doubt sepsis but did get dose of antibiotics based on the lactic acid being 3.5 and patient being tachypneic and leukocytotic   1256 After pain meds became hypoxic and does have extensive  emphysema and was working to breathe therefore given DuoNebs and Solu-Medrol with marked improvement in breathing.  Repeat VBG's improved   1256 Requiring 2 L of oxygen therefore plan for admission   1442 I spoke with orthopedics who recommends sling and nonoperative management acutely.   1500 I spoke with Dr. J[facility] hospitalist who agreed to transfer     Ultimately unclear what drove the lactic acidosis.  Breathing is improved.  Did give dose of sepsis antibiotics based on her initial labs.  Treated for COPD exacerbation.  No evidence of pulmonary emboli.  Does have proximal humeral head neck fracture treated with pain meds and sling    Medical Decision Making  Obtained supplemental history:Supplemental history obtained?: EMS  Reviewed external records: External records reviewed?: Outpatient Record: Reviewed General Surgery Consultation from 5/15/2024  and Prior Imaging: Reviewed CT abdomen pelvis results from 5/22/2024  Care impacted by chronic illness:Hyperlipidemia and Hypertension  Care significantly affected by social determinants of health:N/A  Did you consider but not order tests?: Work up considered but not performed and documented in chart, if applicable  Did you interpret images independently?: Independent interpretation of ECG and images noted in documentation, when applicable.  Consultation discussion with other provider:Did you involve another provider (consultant, MH, pharmacy, etc.)?: I discussed the care with another health care provider, see documentation for details.  Admit.    At the conclusion of the encounter I discussed the results of all of the tests and the disposition. The questions were answered. The patient or family acknowledged understanding and was agreeable with the care plan.       MEDICATIONS GIVEN IN THE EMERGENCY:  Medications   sodium chloride (PF) 0.9% PF flush 3 mL (has no administration in time range)   sodium chloride (PF) 0.9% PF flush 3 mL (3 mLs Intracatheter  Not Given 5/23/24 1621)   sodium chloride 0.9% BOLUS 1,893 mL (0 mLs Intravenous Stopped 5/23/24 1404)   HYDROmorphone (PF) (DILAUDID) injection 0.5 mg (0.5 mg Intravenous $Given 5/23/24 0918)   HYDROmorphone (PF) (DILAUDID) injection 0.5 mg (0.5 mg Intravenous $Given 5/23/24 1035)   ceFEPIme (MAXIPIME) 1 g vial to attach to  mL bag for ADULTS or NS 50 mL bag for PEDS (0 g Intravenous Stopped 5/23/24 1125)   metroNIDAZOLE (FLAGYL) infusion 500 mg (0 mg Intravenous Stopped 5/23/24 1325)   iopamidol (ISOVUE-370) solution 75 mL (75 mLs Intravenous $Given 5/23/24 1032)   pantoprazole (PROTONIX) IV push injection 40 mg (40 mg Intravenous $Given 5/23/24 1134)   methylPREDNISolone sodium succinate (solu-MEDROL) injection 125 mg (125 mg Intravenous $Given 5/23/24 1137)   ipratropium - albuterol 0.5 mg/2.5 mg/3 mL (DUONEB) neb solution 3 mL (3 mLs Nebulization $Given 5/23/24 1140)   ondansetron (ZOFRAN) injection 4 mg (4 mg Intravenous $Given 5/23/24 1324)   oxyCODONE (ROXICODONE) tablet 5 mg (5 mg Oral $Given 5/23/24 1408)       NEW PRESCRIPTIONS STARTED AT TODAY'S ER VISIT  New Prescriptions    No medications on file          =================================================================    HPI    Patient information was obtained from: Patient, EMS    Use of : N/A       Mary Martino is a 74 year old female with a pertinent history of hypertension, hyperlipidemia, hypothyroidism, and GERD who presents to this ED via EMS for evaluation of a fall.    Per chart review, the patient presented to [facility] on 5/15/2024 for evaluation of abdominal pain. History of a prior ventral hernia, no new hernia seen during visit. Recommended the patient obtain a CT scan of her abdomen and pelvis, which was completed on 5/22. Results from this showed diffuse circumferential wall thickening in the stomach which was nonspecific. There was also an indeterminate right adrenal  nodule. Recommended follow-up with adrenal mass CT.      REVIEW OF SYSTEMS   Review of Systems     PAST MEDICAL HISTORY:  Past Medical History:   Diagnosis Date    Depression     GERD (gastroesophageal reflux disease)     Hyperlipidemia     Hypertension     Hypothyroidism     h/o grave's disease    Leiomyoma of uterus     PONV (postoperative nausea and vomiting)     Seasonal allergies     Ventral hernia        PAST SURGICAL HISTORY:  Past Surgical History:   Procedure Laterality Date    APPENDECTOMY      CHOLECYSTECTOMY      COLON SURGERY  2009    partial colon resection    EYE SURGERY      for proptosis    HEMIARTHROPLASTY SHOULDER FRACTURE Right     HERNIA REPAIR      HERNIORRHAPHY VENTRAL N/A 3/13/2018    Procedure: VENTRAL HERNIA REPAIR;  Surgeon: Josue Love MD;  [place] [facility]  Service:     HUMERUS FRACTURE SURGERY      HYSTERECTOMY Bilateral 3/13/2018    Procedure: EXPLORATORY LAPAROTOMY TOTAL ABDOMINAL HYSTERECTOMY, BILATERAL SALPINGO OOPHORECTOMY PELVIC WASHINGS LYSIS OF ADHESIONS;  Surgeon: Sheri Zamarripa MD;  [place] [facility]  Service:     THYROID SURGERY      ablation    TUBAL LIGATION             CURRENT MEDICATIONS:    aspirin 81 MG EC tablet  betamethasone valerate (VALISONE) 0.1 % cream  calcium carbonate (OS-VADIM) 600 mg calcium (1,500 mg) tablet  cholecalciferol, vitamin D3, 1,000 unit tablet  Flaxseed, Linseed, (FLAX SEED OIL) 1000 MG capsule  levothyroxine (SYNTHROID/LEVOTHROID) 100 MCG tablet  lisinopril-hydrochlorothiazide (ZESTORETIC) 20-12.5 MG tablet  naproxen sodium (ALEVE) 220 MG tablet  omeprazole (PRILOSEC) 40 MG capsule  pravastatin (PRAVACHOL) 40 MG tablet        ALLERGIES:  Allergies   Allergen Reactions    Amlodipine Dizziness    Codeine Nausea and Vomiting    Penicillins Hives     Occurred during childhood, Other reaction(s): *Unknown - Childhood Rxn, tolerates cephalosporins w/o problems       FAMILY HISTORY:  Family History   Problem Relation Age  of Onset    Cancer Father 76.00        brain    Breast Cancer No family hx of        SOCIAL HISTORY:   Social History     Socioeconomic History    Marital status:    Tobacco Use    Smoking status: Former     Current packs/day: 0.00     Types: Cigarettes     Quit date: 2000     Years since quittin.4    Smokeless tobacco: Never   Substance and Sexual Activity    Alcohol use: Yes     Comment: Alcoholic Drinks/day: 1 drink a month     Drug use: No       VITALS:  /83   Pulse 89   Temp 98  F (36.7  C)   Resp 15   Wt 63 kg (139 lb)   SpO2 94%   BMI 26.26 kg/m      PHYSICAL EXAM      Vitals: /83   Pulse 89   Temp 98  F (36.7  C)   Resp 15   Wt 63 kg (139 lb)   SpO2 94%   BMI 26.26 kg/m    General: Tachypneic, mild distress, awake, alert, interactive.  Eyes: Conjunctivae non-injected. Sclera anicteric.  HENT: Atraumatic.  Neck: Supple.  Heart: 2+ radial pulse, regular rate and rhythm  Respiratory/Chest: Labored breathing with tachypnea, bilateral breath sounds  Abdomen: non distended, no abdominal tenderness  Musculoskeletal: Tenderness to left upper arm, painful range of motion left upper arm, no painful range of motion of right arm or lower extremities or pelvis  Skin: Normal color. No rash or diaphoresis.  Neurologic: Face symmetric, moves all extremities spontaneously. Speech clear.  Psychiatric: Oriented to person, place, and time. Affect appropriate.    LAB:  All pertinent labs reviewed and interpreted.  Results for orders placed or performed during the hospital encounter of 24   XR Chest Port 1 View    Impression    IMPRESSION: Minimal basilar opacities, likely atelectasis. No significant pleural effusion. No pneumothorax. Stable cardiomediastinal silhouette. No pulmonary edema. Scoliosis. Right shoulder arthroplasty. No obvious acute fracture by radiography.   Shoulder XR, left  2-3 vw PORTABLE    Impression    IMPRESSION: Acute mildly displaced fracture of the surgical  neck of the humerus. No evidence of additional fractures. Osteopenia. Advanced degenerative changes at the glenohumeral joint.   XR Humerus Port Left G/E 2 Views    Impression    IMPRESSION: Acute mildly displaced fracture of the surgical neck of the humerus. Osteopenia. Advanced degenerative changes at the glenohumeral joint.   CT Chest Pulmonary Embolism w Contrast    Impression    IMPRESSION:  1.  Negative for pulmonary emboli and negative for dissections.    2.  Enlarged central pulmonary arteries should be due to pulmonary hypertension.    3.  Advanced emphysema.   CT Abdomen Pelvis w Contrast    Impression    IMPRESSION:   1.  Generalized thickening of the stomach similar to recent CT from yesterday. Possibly from some generalized gastritis.    2.  No significant new finding.   Troponin T, High Sensitivity (now)   Result Value Ref Range    Troponin T, High Sensitivity 19 (H) <=14 ng/L   Basic metabolic panel   Result Value Ref Range    Sodium 141 135 - 145 mmol/L    Potassium 4.1 3.4 - 5.3 mmol/L    Chloride 105 98 - 107 mmol/L    Carbon Dioxide (CO2) 21 (L) 22 - 29 mmol/L    Anion Gap 15 7 - 15 mmol/L    Urea Nitrogen 23.4 (H) 8.0 - 23.0 mg/dL    Creatinine 1.12 (H) 0.51 - 0.95 mg/dL    GFR Estimate 51 (L) >60 mL/min/1.73m2    Calcium 9.4 8.8 - 10.2 mg/dL    Glucose 120 (H) 70 - 99 mg/dL   Nt probnp inpatient (BNP)   Result Value Ref Range    N terminal Pro BNP Inpatient 307 0 - 900 pg/mL   Lactic acid whole blood   Result Value Ref Range    Lactic Acid 3.5 (H) 0.7 - 2.0 mmol/L   CBC with platelets and differential   Result Value Ref Range    WBC Count 11.9 (H) 4.0 - 11.0 10e3/uL    RBC Count 4.17 3.80 - 5.20 10e6/uL    Hemoglobin 12.0 11.7 - 15.7 g/dL    Hematocrit 35.1 35.0 - 47.0 %    MCV 84 78 - 100 fL    MCH 28.8 26.5 - 33.0 pg    MCHC 34.2 31.5 - 36.5 g/dL    RDW 14.2 10.0 - 15.0 %    Platelet Count 245 150 - 450 10e3/uL    % Neutrophils 81 %    % Lymphocytes 9 %    % Monocytes 9 %    % Eosinophils 0 %     % Basophils 0 %    % Immature Granulocytes 1 %    NRBCs per 100 WBC 0 <1 /100    Absolute Neutrophils 9.7 (H) 1.6 - 8.3 10e3/uL    Absolute Lymphocytes 1.0 0.8 - 5.3 10e3/uL    Absolute Monocytes 1.1 0.0 - 1.3 10e3/uL    Absolute Eosinophils 0.0 0.0 - 0.7 10e3/uL    Absolute Basophils 0.0 0.0 - 0.2 10e3/uL    Absolute Immature Granulocytes 0.1 <=0.4 10e3/uL    Absolute NRBCs 0.0 10e3/uL   UA with Microscopic reflex to Culture    Specimen: Urine, NOS   Result Value Ref Range    Color Urine Colorless Colorless, Straw, Light Yellow, Yellow    Appearance Urine Clear Clear    Glucose Urine Negative Negative mg/dL    Bilirubin Urine Negative Negative    Ketones Urine Negative Negative mg/dL    Specific Gravity Urine 1.016 1.001 - 1.030    Blood Urine Negative Negative    pH Urine 7.5 (H) 5.0 - 7.0    Protein Albumin Urine Negative Negative mg/dL    Urobilinogen Urine <2.0 <2.0 mg/dL    Nitrite Urine Negative Negative    Leukocyte Esterase Urine Negative Negative    Mucus Urine Present (A) None Seen /LPF    RBC Urine 0 <=2 /HPF    WBC Urine 0 <=5 /HPF    Squamous Epithelials Urine <1 <=1 /HPF   Lactic Acid Whole Blood with 1X Repeat in 2 HR when >2   Result Value Ref Range    Lactic Acid, Initial 1.3 0.7 - 2.0 mmol/L   Blood gas venous   Result Value Ref Range    pH Venous 7.46 (H) 7.32 - 7.43    pCO2 Venous 15 (LL) 40 - 50 mm Hg    pO2 Venous 32 25 - 47 mm Hg    Bicarbonate Venous 11 (L) 21 - 28 mmol/L    Base Excess/Deficit Venous -13.1 (L) -3.0 - 3.0 mmol/L    FIO2 28     Oxyhemoglobin Venous 64 (L) 70 - 75 %    O2 Sat, Venous 65.5 (L) 70.0 - 75.0 %   Result Value Ref Range    Troponin T, High Sensitivity 17 (H) <=14 ng/L   Salicylate level   Result Value Ref Range    Salicylate <0.3   mg/dL   Blood gas venous   Result Value Ref Range    pH Venous 7.46 (H) 7.32 - 7.43    pCO2 Venous 30 (L) 40 - 50 mm Hg    pO2 Venous 37 25 - 47 mm Hg    Bicarbonate Venous 21 21 - 28 mmol/L    Base Excess/Deficit Venous -2.5 -3.0 -  3.0 mmol/L    FIO2 21     Oxyhemoglobin Venous 71 70 - 75 %    O2 Sat, Venous 71.8 70.0 - 75.0 %       RADIOLOGY:  Reviewed all pertinent imaging. Please see official radiology report.  CT Abdomen Pelvis w Contrast   Final Result   IMPRESSION:    1.  Generalized thickening of the stomach similar to recent CT from yesterday. Possibly from some generalized gastritis.      2.  No significant new finding.      CT Chest Pulmonary Embolism w Contrast   Final Result   IMPRESSION:   1.  Negative for pulmonary emboli and negative for dissections.      2.  Enlarged central pulmonary arteries should be due to pulmonary hypertension.      3.  Advanced emphysema.      XR Humerus Port Left G/E 2 Views   Final Result   IMPRESSION: Acute mildly displaced fracture of the surgical neck of the humerus. Osteopenia. Advanced degenerative changes at the glenohumeral joint.      XR Chest Port 1 View   Final Result   IMPRESSION: Minimal basilar opacities, likely atelectasis. No significant pleural effusion. No pneumothorax. Stable cardiomediastinal silhouette. No pulmonary edema. Scoliosis. Right shoulder arthroplasty. No obvious acute fracture by radiography.      Shoulder XR, left  2-3 vw PORTABLE   Final Result   IMPRESSION: Acute mildly displaced fracture of the surgical neck of the humerus. No evidence of additional fractures. Osteopenia. Advanced degenerative changes at the glenohumeral joint.          EKG:    Performed at: 08:32, 23-May-2024    Impression: Sinus rhythm with premature supraventricular complexes. Otherwise normal ECG.    Rate: 70 bpm  Rhythm: Sinus rhythm  Axis: 86  VT Interval: 206  QRS Interval: 76  QTc Interval: 455  ST Changes: NA  Comparison: When compared with ECG of 6-Jan-2002, premature supraventricular complexes are now present. QT has lengthened.     I have independently reviewed and interpreted the EKG(s) documented above.    PROCEDURES:   None        I, Jenniffer Murcia, am serving as a scribe to document  services personally performed by Mekhi Manzanares MD based on my observation and the provider's statements to me. I, Mekhi Manzanares MD, attest that Jenniffer Murcia is acting in a scribe capacity, has observed my performance of the services and has documented them in accordance with my direction.    Mekhi Manzanares MD  [facility] EMERGENCY DEPARTMENT  [address] [place] [id number]  [phone]      Mekhi Manzanares MD  05/23/24 4622       Mekhi Manzanares MD  05/23/24 7214

## 2024-05-23 NOTE — ED NOTES
Attempted to get patient up, patient became nauseas and vomited.  MD updated, patient to be admitted.

## 2024-05-23 NOTE — PHARMACY-ADMISSION MEDICATION HISTORY
Pharmacist Admission Medication History    Admission medication history is complete. The information provided in this note is only as accurate as the sources available at the time of the update.    Information Source(s): Patient, Clinic records, and CareEverywhere/SureScripts via in-person    Pertinent Information: none    Changes made to PTA medication list:  Added: None  Deleted: lisinopril, levofloxacin  Changed: flax seed oil, levothyroxine, naproxen (BID PRN -> QAM)    Allergies reviewed with patient and updates made in EHR: yes    Medication History Completed By: Nicollette McMann, RPH 5/23/2024 10:45 AM    PTA Med List   Medication Sig Last Dose    aspirin 81 MG EC tablet [ASPIRIN 81 MG EC TABLET] Take 81 mg by mouth daily. 5/22/2024 at PM    betamethasone valerate (VALISONE) 0.1 % cream [BETAMETHASONE VALERATE (VALISONE) 0.1 % CREAM] Apply 1 application topically 2 (two) times a day as needed.   at PRN    calcium carbonate (OS-VADIM) 600 mg calcium (1,500 mg) tablet [CALCIUM CARBONATE (OS-VADIM) 600 MG CALCIUM (1,500 MG) TABLET] Take 600 mg by mouth daily.  5/22/2024 at PM    cholecalciferol, vitamin D3, 1,000 unit tablet [CHOLECALCIFEROL, VITAMIN D3, 1,000 UNIT TABLET] Take 1,000 Units by mouth daily.  5/23/2024 at AM    Flaxseed, Linseed, (FLAX SEED OIL) 1000 MG capsule Take 1 capsule by mouth daily 5/22/2024 at PM    levothyroxine (SYNTHROID/LEVOTHROID) 100 MCG tablet Take 100 mcg by mouth daily 5/23/2024 at AM    lisinopril-hydrochlorothiazide (ZESTORETIC) 20-12.5 MG tablet Take 1 tablet by mouth daily 5/23/2024 at AM    naproxen sodium (ALEVE) 220 MG tablet Take 220 mg by mouth daily (with breakfast) 5/23/2024 at AM    omeprazole (PRILOSEC) 40 MG capsule [OMEPRAZOLE (PRILOSEC) 40 MG CAPSULE] Take 40 mg by mouth daily.  5/23/2024 at AM    pravastatin (PRAVACHOL) 40 MG tablet [PRAVASTATIN (PRAVACHOL) 40 MG TABLET] Take 40 mg by mouth at bedtime.  5/22/2024 at PM

## 2024-05-23 NOTE — ED NOTES
Bed: [place] [id number]  Expected date:   Expected time:   Means of arrival: Ambulance  Comments:  NENA Meier fall / arm pain

## 2024-05-23 NOTE — ED TRIAGE NOTES
Patient brought in by EMS from home, states patient tripped over her  and landed to left side, no LOC, no thinners, c/o left upper arm pain into shoulder.  Given 50 mcg Fentanyl en route with some relief, became hypotensive in 50's now within baseline. C/o diarrhea today, thinks she ate bad bread this morning.      Triage Assessment (Adult)       Row Name 05/23/24 0827          Triage Assessment    Airway WDL WDL        Respiratory WDL    Respiratory WDL WDL        Skin Circulation/Temperature WDL    Skin Circulation/Temperature WDL WDL        Cardiac WDL    Cardiac WDL WDL        Peripheral/Neurovascular WDL    Peripheral Neurovascular WDL neurovascular assessment upper;pulse assessment     Pulse Assessment radial        Pulse Radial    Left Radial Pulse 2+ (normal)     Right Radial Pulse 2+ (normal)        Cognitive/Neuro/Behavioral WDL    Cognitive/Neuro/Behavioral WDL WDL        LUE Neurovascular Assessment    Temperature LUE warm     Color LUE no discoloration     Sensation LUE tenderness present        RUE Neurovascular Assessment    Temperature RUE warm     Color RUE no discoloration     Sensation RUE no numbness;no tenderness;no tingling

## 2024-05-24 ENCOUNTER — APPOINTMENT (OUTPATIENT)
Dept: OCCUPATIONAL THERAPY | Facility: CLINIC | Age: 74
End: 2024-05-24
Attending: FAMILY MEDICINE
Payer: COMMERCIAL

## 2024-05-24 ENCOUNTER — APPOINTMENT (OUTPATIENT)
Dept: PHYSICAL THERAPY | Facility: CLINIC | Age: 74
End: 2024-05-24
Attending: FAMILY MEDICINE
Payer: COMMERCIAL

## 2024-05-24 VITALS
OXYGEN SATURATION: 93 % | BODY MASS INDEX: 26.99 KG/M2 | DIASTOLIC BLOOD PRESSURE: 76 MMHG | RESPIRATION RATE: 16 BRPM | SYSTOLIC BLOOD PRESSURE: 119 MMHG | WEIGHT: 142.86 LBS | TEMPERATURE: 97.8 F | HEART RATE: 88 BPM

## 2024-05-24 LAB
ANION GAP SERPL CALCULATED.3IONS-SCNC: 15 MMOL/L (ref 7–15)
BUN SERPL-MCNC: 20.4 MG/DL (ref 8–23)
CALCIUM SERPL-MCNC: 8.9 MG/DL (ref 8.8–10.2)
CHLORIDE SERPL-SCNC: 106 MMOL/L (ref 98–107)
CREAT SERPL-MCNC: 1.15 MG/DL (ref 0.51–0.95)
DEPRECATED HCO3 PLAS-SCNC: 18 MMOL/L (ref 22–29)
EGFRCR SERPLBLD CKD-EPI 2021: 50 ML/MIN/1.73M2
ERYTHROCYTE [DISTWIDTH] IN BLOOD BY AUTOMATED COUNT: 14.4 % (ref 10–15)
GLUCOSE SERPL-MCNC: 119 MG/DL (ref 70–99)
HCT VFR BLD AUTO: 33.6 % (ref 35–47)
HGB BLD-MCNC: 11.4 G/DL (ref 11.7–15.7)
MCH RBC QN AUTO: 28.7 PG (ref 26.5–33)
MCHC RBC AUTO-ENTMCNC: 33.9 G/DL (ref 31.5–36.5)
MCV RBC AUTO: 85 FL (ref 78–100)
PLATELET # BLD AUTO: 280 10E3/UL (ref 150–450)
POTASSIUM SERPL-SCNC: 3.7 MMOL/L (ref 3.4–5.3)
RBC # BLD AUTO: 3.97 10E6/UL (ref 3.8–5.2)
SODIUM SERPL-SCNC: 139 MMOL/L (ref 135–145)
WBC # BLD AUTO: 10.9 10E3/UL (ref 4–11)

## 2024-05-24 PROCEDURE — 97161 PT EVAL LOW COMPLEX 20 MIN: CPT | Mod: GP | Performed by: PHYSICAL MEDICINE & REHABILITATION

## 2024-05-24 PROCEDURE — 97110 THERAPEUTIC EXERCISES: CPT | Mod: GO

## 2024-05-24 PROCEDURE — 85027 COMPLETE CBC AUTOMATED: CPT

## 2024-05-24 PROCEDURE — 250N000013 HC RX MED GY IP 250 OP 250 PS 637: Performed by: FAMILY MEDICINE

## 2024-05-24 PROCEDURE — 97535 SELF CARE MNGMENT TRAINING: CPT | Mod: GO

## 2024-05-24 PROCEDURE — 250N000011 HC RX IP 250 OP 636

## 2024-05-24 PROCEDURE — 250N000013 HC RX MED GY IP 250 OP 250 PS 637

## 2024-05-24 PROCEDURE — G0378 HOSPITAL OBSERVATION PER HR: HCPCS

## 2024-05-24 PROCEDURE — 97165 OT EVAL LOW COMPLEX 30 MIN: CPT | Mod: GO

## 2024-05-24 PROCEDURE — 36415 COLL VENOUS BLD VENIPUNCTURE: CPT

## 2024-05-24 PROCEDURE — 97530 THERAPEUTIC ACTIVITIES: CPT | Mod: GP | Performed by: PHYSICAL MEDICINE & REHABILITATION

## 2024-05-24 PROCEDURE — 99239 HOSP IP/OBS DSCHRG MGMT >30: CPT | Performed by: INTERNAL MEDICINE

## 2024-05-24 PROCEDURE — 80048 BASIC METABOLIC PNL TOTAL CA: CPT

## 2024-05-24 RX ORDER — OXYCODONE HYDROCHLORIDE 5 MG/1
5 TABLET ORAL EVERY 4 HOURS PRN
Qty: 20 TABLET | Refills: 0 | Status: ON HOLD | OUTPATIENT
Start: 2024-05-24 | End: 2024-06-11

## 2024-05-24 RX ORDER — ONDANSETRON 4 MG/1
4 TABLET, ORALLY DISINTEGRATING ORAL EVERY 6 HOURS PRN
Qty: 30 TABLET | Refills: 0 | Status: SHIPPED | OUTPATIENT
Start: 2024-05-24

## 2024-05-24 RX ORDER — ACETAMINOPHEN 325 MG/1
650 TABLET ORAL EVERY 6 HOURS
Qty: 120 TABLET | Refills: 1 | Status: ON HOLD | OUTPATIENT
Start: 2024-05-24 | End: 2024-06-11

## 2024-05-24 RX ADMIN — ASPIRIN 81 MG: 81 TABLET ORAL at 08:57

## 2024-05-24 RX ADMIN — OXYCODONE HYDROCHLORIDE 5 MG: 5 TABLET ORAL at 06:07

## 2024-05-24 RX ADMIN — ACETAMINOPHEN 975 MG: 325 TABLET, FILM COATED ORAL at 02:27

## 2024-05-24 RX ADMIN — LEVOTHYROXINE SODIUM 100 MCG: 100 TABLET ORAL at 06:02

## 2024-05-24 RX ADMIN — OXYCODONE HYDROCHLORIDE 5 MG: 5 TABLET ORAL at 00:17

## 2024-05-24 RX ADMIN — PANTOPRAZOLE SODIUM 40 MG: 40 TABLET, DELAYED RELEASE ORAL at 06:02

## 2024-05-24 RX ADMIN — NAPROXEN 250 MG: 250 TABLET ORAL at 08:57

## 2024-05-24 RX ADMIN — LISINOPRIL AND HYDROCHLOROTHIAZIDE 1 TABLET: 12.5; 2 TABLET ORAL at 08:59

## 2024-05-24 RX ADMIN — ACETAMINOPHEN 975 MG: 325 TABLET, FILM COATED ORAL at 08:56

## 2024-05-24 RX ADMIN — OXYCODONE HYDROCHLORIDE 5 MG: 5 TABLET ORAL at 11:38

## 2024-05-24 RX ADMIN — ONDANSETRON 4 MG: 4 TABLET, ORALLY DISINTEGRATING ORAL at 08:56

## 2024-05-24 ASSESSMENT — ACTIVITIES OF DAILY LIVING (ADL)
ADLS_ACUITY_SCORE: 21
ADLS_ACUITY_SCORE: 21
ADLS_ACUITY_SCORE: 25
ADLS_ACUITY_SCORE: 20
ADLS_ACUITY_SCORE: 20
ADLS_ACUITY_SCORE: 21
ADLS_ACUITY_SCORE: 20
ADLS_ACUITY_SCORE: 21
ADLS_ACUITY_SCORE: 25
ADLS_ACUITY_SCORE: 20
ADLS_ACUITY_SCORE: 21
ADLS_ACUITY_SCORE: 21

## 2024-05-24 NOTE — PROGRESS NOTES
05/24/24 0900   Appointment Info   Signing Clinician's Name / Credentials (PT) Santiago De Guzman, PT, DPT   Rehab Comments (PT) No ROM of the left shoulder. Ok to do gentle left elbow ROM, ROM as tolerated of the wrist and hand. NWB LUE. Sling at all time   Quick Adds   Quick Adds Certification   Living Environment   People in Home spouse   Current Living Arrangements house   Home Accessibility stairs to enter home;stairs within home   Number of Stairs, Main Entrance 2   Stair Railings, Main Entrance railings safe and in good condition;railings on both sides of stairs   Transportation Anticipated family or friend will provide   Living Environment Comments has basement and upper floor, but all needs met on main floor   Self-Care   Equipment Currently Used at Home none   Fall history within last six months yes   Number of times patient has fallen within last six months 1   Activity/Exercise/Self-Care Comment does not use AD at baseline; has good social support from family; IND with ADLs PLOF   General Information   Onset of Illness/Injury or Date of Surgery 05/23/24   Referring Physician Dr. Galvan   Patient/Family Therapy Goals Statement (PT) to return home safely   Pertinent History of Current Problem (include personal factors and/or comorbidities that impact the POC) 74 year old female who presents on 5/23/24 with left humerus fracture secondary to ground level fall. She is being admitted for pain control.   Existing Precautions/Restrictions weight bearing   Weight-Bearing Status - LUE nonweight-bearing   Cognition   Affect/Mental Status (Cognition) WFL   Orientation Status (Cognition) oriented x 4   Follows Commands (Cognition) WFL   Pain Assessment   Patient Currently in Pain Yes, see Vital Sign flowsheet   Posture    Posture Forward head position;Kyphosis   Strength (Manual Muscle Testing)   Strength (Manual Muscle Testing) strength is WFL   Strength Comments not appropriate to test LUE   Bed Mobility   Bed  Mobility supine-sit   Supine-Sit Wells (Bed Mobility) supervision;verbal cues   Bed Mobility Limitations decreased ability to use arms for pushing/pulling   Impairments Contributing to Impaired Bed Mobility pain   Transfers   Transfers no deficits identified   Gait/Stairs (Locomotion)   Wells Level (Gait) supervision   Distance in Feet (Gait) 30   Pattern (Gait) step-through   Deviations/Abnormal Patterns (Gait) gait speed decreased   Clinical Impression   Criteria for Skilled Therapeutic Intervention Yes, treatment indicated   PT Diagnosis (PT) impaired functional mobility   Influenced by the following impairments pain, weakness   Functional limitations due to impairments bed mobility, transfers   Clinical Presentation (PT Evaluation Complexity) stable   Clinical Presentation Rationale clinical judgement   Clinical Decision Making (Complexity) low complexity   Planned Therapy Interventions (PT) bed mobility training;home exercise program;gait training;strengthening;progressive activity/exercise;patient/family education   Risk & Benefits of therapy have been explained evaluation/treatment results reviewed;care plan/treatment goals reviewed;risks/benefits reviewed;current/potential barriers reviewed;participants voiced agreement with care plan;participants included;patient;spouse/significant other   PT Total Evaluation Time   PT Eval, Low Complexity Minutes (98796) 10   Therapy Certification   Start of care date 05/24/24   Certification date from 05/24/24   Certification date to 05/31/24   Medical Diagnosis Humerus surgical neck fracture   Physical Therapy Goals   PT Frequency 5x/week   PT Predicted Duration/Target Date for Goal Attainment 05/31/24   PT Goals Bed Mobility;Gait;Stairs   PT: Bed Mobility Supervision/stand-by assist;Supine to/from sit;Within precautions   PT: Gait Supervision/stand-by assist;Within precautions;150 feet   PT: Stairs 2 stairs   Interventions   Interventions Quick Adds  Therapeutic Activity   Therapeutic Activity   Therapeutic Activities: dynamic activities to improve functional performance Minutes (09020) 15   Symptoms Noted During/After Treatment Increased pain   Treatment Detail/Skilled Intervention Patient educated on role of PT during hospital stay in regards to obtaining current funcitonal level resulting in a safe disposition plan. Sling was adjusted to correct position on arm/neck. Patient educated on NWB LUE precautions as well. Exercises discussed included gripping, supination/pronation, and elbow flex/ext all while in sling. Able to get up from bed, then chair to walk about in room without AD x30 feet with good stability SBA. Left with  call light within reach.   PT Discharge Planning   PT Plan Fri 1/5: progress gait distance without device, generalized BLE endurance   PT Discharge Recommendation (DC Rec) home with assist;home with outpatient physical therapy   PT Rationale for DC Rec Patient moving at baseline within LUE NWB precautions. She has her  at home 24/7 with good family support nearby that can help as needed.   PT Brief overview of current status SBA bed mobility, transfers, gait x30 feet at this time due to fatigue; No ROM of the left shoulder. Sling at all time   PT Equipment Needed at Discharge other (see comments)  (none needed)   Total Session Time   Timed Code Treatment Minutes 15   Total Session Time (sum of timed and untimed services) 25     [facility]  OUTPATIENT PHYSICAL THERAPY EVALUATION  PLAN OF TREATMENT FOR OUTPATIENT REHABILITATION  (COMPLETE FOR INITIAL CLAIMS ONLY)  Patient's Last Name, First Name, M.I.  YOB: 1950  Mary Martino                        Provider's Name  [facility] Medical Record No.  [id number]                             Onset Date:  05/23/24   Start of Care Date:  05/24/24   Type:     _X_PT   ___OT   ___SLP Medical Diagnosis:   Humerus surgical neck fracture              PT Diagnosis:  impaired functional mobility Visits from SOC:  1     See note for plan of treatment, functional goals and certification details    I CERTIFY THE NEED FOR THESE SERVICES FURNISHED UNDER        THIS PLAN OF TREATMENT AND WHILE UNDER MY CARE     (Physician co-signature of this document indicates review and certification of the therapy plan).

## 2024-05-24 NOTE — CONSULTS
Care Management:    The patient had a humerus fracture with repair.    Chart reviewed and plan of care discussed in Interdisciplinary Rounds.  Met with the Patient and , Polo.  The patient lives independently in the community.  There are no discharge needs identified.    Patient to arrange transportation upon discharge.    Plan:  Home today      Hallie Dumont RN, Care Coordinator [phone]

## 2024-05-24 NOTE — DISCHARGE SUMMARY
[place] Hospitalist Discharge Summary    Mary Martino MRN# [id number]   Age: 74 year old YOB: 1950     Date of Admission:  5/23/2024  Date of Discharge::  5/24/2024  Admitting Physician:  Johny Ritchie MD  Discharge Physician:  Jackie Galvan MD  Primary Physician: Misha Alonzo  Transferring Facility: N/A     Home clinic: unknown          Admission Diagnoses:   Closed nondisplaced fracture of surgical neck of left humerus,  Humerus surgical neck fracture          Discharge Diagnosis:     Principle diagnosis:   Acute fracture of left humerus secondary to ground level fall   Secondary diagnoses:  Patient Active Problem List   Diagnosis    Fibroid uterus    Recurrent ventral hernia with incarceration    Humerus surgical neck fracture          Brief History of Presenting Illness:   As per admit hx  Mary Martino is a 74 year old female with past medical history of hypertension, hyperlipidemia, chronic kidney disease, hypothyroidism, GERD, now presents on 5/23 with left arm pain secondary to ground level fall.     Mary states that she developed left arm pain after sustaining a ground level fall at home in the kitchen earlier today. Her fall sounds mechanical, as she describes tripper over her husbands feet. She denies lightheadedness or dizziness surrounding fall. Denies head injury or loss of consciousness. Not on anticoagulation. She was brought to the emergency department for evaluation in which XR left arm showing humerus fracture. On arrival to the emergency department she was notably with labored breathing and tachypnea. She denies prior difficulty breathing and attributes this to the pain in her arm. Also endorses ongoing abdominal pain with recent evaluation by general surgery in clinic and possible mesh component as source of pain         Hospital Course:   Acute fracture of left humerus secondary to ground level fall    XR showing acute mildly displaced fracture of the surgical neck of  left humerus. Sling was placed in emergency department and   Ortho seen. Conservative non operative rx recommended. To f/up OP in 2 weeks.   Will discharge on oxycodone/ zofran and tylenol prn     Acute respiratory failure with hypoxia (resolved)    Presented with labored breathing and tachypnea. Noted to be hypoxia and placed on 2 liters supplemental oxygen. Possibly related to fentanyl administered by EMS. On arrival to [facility] oxygenating in the lower 90's on RA. Patient correlated changes in breathing with pain secondary to arm movement. Primary respiratory alkalosis as evidenced by VBG with alkalosis (7.46), CO2 15, and bicarb 11. CT PE showing no evidence for pulmonary emboli or dissections. There is evidence of advanced emphysema.  Resolved. Stable sats on RA     Elevated lactate (resolved)  NAGMA    Lactate elevated 3.5 with normalization on repeat 1.3. no sn/sx of infection.  Bicarb is 18. F/up OP in 1-2 weeks     Leukocytosis    Mildly elevated (11.9) on presentation. No source for infection identified on CXR or urinalysis  Resolved      Abnormal CT abdomen    Recent evaluation for abdominal pain by general surgery. No abdominal pain or tenderness to palpation on exam. CT abdomen showing generalized thickening of the stomach similar to recent CT 5/22, possibly secondary to gastritis.   Pt not eating muc especially supper. Has also lost some weight. Could be gastric malignancy.   Continue prolosec at home -f/up OP for EGD with bx     Chronic kidney disease, stage 3a    Creatine 1.12 on admission with baseline creatine near 1.10. Appears stable.     Essential hypertension    Chronic. Blood pressures reviewed, largely controlled. Managed PTA with lisinopril-hydrochlorothiazide 20-12.5 daily.  - Continue lisinopril-hydrochlorothiazide     Hypothyroidism/Graves disease    H/o Graves disease with thyroid ablation. Managed PTA with levothyroxine 100 mcg daily.  - Continue levothyroxine     Hyperlipidemia     Chronic. Managed PTA with pravastatin 40 mg daily.  - Continue pravastatin     Gastroesophageal reflux disease    Chronic. Managed PTA with omeprazole 40 mg daily.  - Continue omeprazole.                Procedures:   No procedures performed during this admission         Allergies:      Allergies   Allergen Reactions    Amlodipine Dizziness    Codeine Nausea and Vomiting    Penicillins Hives     Occurred during childhood, Other reaction(s): *Unknown - Childhood Rxn, tolerates cephalosporins w/o problems             Medications Prior to Admission:     Medications Prior to Admission   Medication Sig Dispense Refill Last Dose    aspirin 81 MG EC tablet [ASPIRIN 81 MG EC TABLET] Take 81 mg by mouth daily.   5/22/2024 at PM    betamethasone valerate (VALISONE) 0.1 % cream [BETAMETHASONE VALERATE (VALISONE) 0.1 % CREAM] Apply 1 application topically 2 (two) times a day as needed.    5/22/2024 at PM    calcium carbonate (OS-VADIM) 600 mg calcium (1,500 mg) tablet [CALCIUM CARBONATE (OS-VADIM) 600 MG CALCIUM (1,500 MG) TABLET] Take 600 mg by mouth daily.    5/22/2024 at pm    cholecalciferol, vitamin D3, 1,000 unit tablet [CHOLECALCIFEROL, VITAMIN D3, 1,000 UNIT TABLET] Take 1,000 Units by mouth daily.    5/23/2024 at am    Flaxseed, Linseed, (FLAX SEED OIL) 1000 MG capsule Take 1 capsule by mouth daily   5/22/2024 at am    levothyroxine (SYNTHROID/LEVOTHROID) 100 MCG tablet Take 100 mcg by mouth daily   5/23/2024 at am    lisinopril-hydrochlorothiazide (ZESTORETIC) 20-12.5 MG tablet Take 1 tablet by mouth daily   5/23/2024 at am    naproxen sodium (ALEVE) 220 MG tablet Take 220 mg by mouth daily (with breakfast)   5/22/2024 at am    omeprazole (PRILOSEC) 40 MG capsule [OMEPRAZOLE (PRILOSEC) 40 MG CAPSULE] Take 40 mg by mouth daily.    5/22/2024 at pm    pravastatin (PRAVACHOL) 40 MG tablet [PRAVASTATIN (PRAVACHOL) 40 MG TABLET] Take 40 mg by mouth at bedtime.    5/22/2024 at pm             Discharge Medications:     Current  Discharge Medication List        START taking these medications    Details   acetaminophen (TYLENOL) 325 MG tablet Take 2 tablets (650 mg) by mouth every 6 hours  Qty: 120 tablet, Refills: 1    Associated Diagnoses: Closed nondisplaced fracture of surgical neck of right humerus, unspecified fracture morphology, initial encounter      ondansetron (ZOFRAN ODT) 4 MG ODT tab Take 1 tablet (4 mg) by mouth every 6 hours as needed for nausea or vomiting  Qty: 30 tablet, Refills: 0    Associated Diagnoses: Closed nondisplaced fracture of surgical neck of right humerus, unspecified fracture morphology, initial encounter      oxyCODONE (ROXICODONE) 5 MG tablet Take 1 tablet (5 mg) by mouth every 4 hours as needed for severe pain (IF pain not managed with non-pharmacological and non-opioid interventions)  Qty: 20 tablet, Refills: 0    Associated Diagnoses: Closed nondisplaced fracture of surgical neck of right humerus, unspecified fracture morphology, initial encounter           CONTINUE these medications which have NOT CHANGED    Details   aspirin 81 MG EC tablet [ASPIRIN 81 MG EC TABLET] Take 81 mg by mouth daily.      betamethasone valerate (VALISONE) 0.1 % cream [BETAMETHASONE VALERATE (VALISONE) 0.1 % CREAM] Apply 1 application topically 2 (two) times a day as needed.       calcium carbonate (OS-VADIM) 600 mg calcium (1,500 mg) tablet [CALCIUM CARBONATE (OS-VADIM) 600 MG CALCIUM (1,500 MG) TABLET] Take 600 mg by mouth daily.       cholecalciferol, vitamin D3, 1,000 unit tablet [CHOLECALCIFEROL, VITAMIN D3, 1,000 UNIT TABLET] Take 1,000 Units by mouth daily.       Flaxseed, Linseed, (FLAX SEED OIL) 1000 MG capsule Take 1 capsule by mouth daily      levothyroxine (SYNTHROID/LEVOTHROID) 100 MCG tablet Take 100 mcg by mouth daily      lisinopril-hydrochlorothiazide (ZESTORETIC) 20-12.5 MG tablet Take 1 tablet by mouth daily      naproxen sodium (ALEVE) 220 MG tablet Take 220 mg by mouth daily (with breakfast)      omeprazole  (PRILOSEC) 40 MG capsule [OMEPRAZOLE (PRILOSEC) 40 MG CAPSULE] Take 40 mg by mouth daily.       pravastatin (PRAVACHOL) 40 MG tablet [PRAVASTATIN (PRAVACHOL) 40 MG TABLET] Take 40 mg by mouth at bedtime.                    Consultations:   Consultation during this admission received from orthopedics            Discharge Exam:   Blood pressure 119/76, pulse 88, temperature 97.8  F (36.6  C), temperature source Oral, resp. rate 16, weight 64.8 kg (142 lb 13.7 oz), SpO2 93%.  GENERAL APPEARANCE: healthy, alert and no distress  EYES: conjunctiva clear, eyes grossly normal  HENT: external ears and nose normal   NECK: supple, no masses or adenopathy  RESP: lungs clear to auscultation - no rales, rhonchi or wheezes  CV: regular rate and rhythm, normal S1 S2, no S3 or S4 and no murmur, click or rub   ABDOMEN: soft, nontender, no HSM or masses and bowel sounds normal  MS: no clubbing, cyanosis; no edema  SKIN: clear without significant rashes or lesions  NEURO: Normal strength and tone, sensory exam grossly normal, mentation intact and speech normal    Unresulted Labs Ordered in the Past 30 Days of this Admission       Date and Time Order Name Status Description    5/23/2024  8:57 AM Blood Culture Peripheral Blood Preliminary     5/23/2024  8:57 AM Blood Culture Peripheral Blood Preliminary             No results found for this or any previous visit (from the past 24 hour(s)).         Pending Tests at Discharge:   Blood cx         Discharge Instructions and Follow-Up:     Discharge diet: Regular   Discharge activity: Activity as tolerated   Discharge follow-up: Follow up with primary care provider in 1-2 weeks. Needs OP EGD with bx scheduled           Discharge Disposition:     Discharged to home      Attestation:  I have reviewed today's vital signs, notes, medications, labs and imaging.    Time Spent on this Encounter   I, Jackie Galvan MD, personally saw the patient today and spent greater than 30 minutes discharging this  patient.    Jackie Galvan MD

## 2024-05-24 NOTE — PROGRESS NOTES
Assumed pt care at 0730. Pt in bed resting with posey vest on and 4 side rails up. Pt is on precedex gtt @0.4 . CIWA score at 0800=10. Will medicate per MAR. Pt has a low grade fever, otherwise VSS.  Pt is alert to self, disoriented to pl [facility] ADMISSION NOTE    Patient admitted to room 2405 at approximately 1945 via cart from [facility]. Patient was accompanied by transport tech.     Verbal SBAR report received from Merced FISHER  prior to patient arrival.     Patient trasferred to bed via air ronni. Patient alert and oriented X 4. The patient is not having any pain.  . Admission vital signs: Blood pressure (!) 158/95, pulse 101, temperature 97.9  F (36.6  C), temperature source Oral, resp. rate 16, SpO2 96%. Patient was oriented to plan of care, call light, bed controls, tv, telephone, bathroom, and visiting hours.     Risk Assessment    The following safety risks were identified during admission: fall. Yellow risk band applied: YES.     Skin Initial Assessment    This writer admitted this patient and completed a full skin assessment and Antelmo score in the Adult PCS flowsheet. Appropriate interventions initiated as needed.     Secondary skin check completed by Candace FISHER.         Education    Patient has a [place] to Observation order: Yes  Observation education completed and documented: Yes      Forrest Ramon RN

## 2024-05-24 NOTE — CONSULTS
[facility] Consultation    Consultation - [facility]  Level of consult: One-time consult to assist in determining a diagnosis, recommend an appropriate treatment plan and place orders    Mary Martino,  1950, MRN [id number]     Admitting Dx: Closed nondisplaced fracture of surgical neck of left humerus,  Humerus surgical neck fracture     PCP: Misha Alonzo, [phone]     Code status:  Full Code     Extended Emergency Contact Information  Primary Emergency Contact: Polo Martino  Address: [address] [place] [address]  Home Phone: [phone]  Relation: Spouse     Assessment:  Right proximal humerus surgical neck fracture with mild displacement    Plan:  This patient was discussed with Dr.Nels Cook, on-call surgeon for [facility] and they are in agreement with the following plan.   Plan for non-operative treatment with sling immobilization and close follow up in 1-2 weeks for repeat xrays.   Discussed with pt who expressed understanding and is in agreement; she has a history of a partial replacement of the right shoulder and has chronic left shoulder arthritic pain so is comfortable with the restrictions and plan.   No ROM of the left shoulder. Ok to do gentle left elbow ROM, ROM as tolerated of the wrist and hand.   NWB LUE. Sling at all times.   Orthopedically stable. Will sign off. Please consult again if further orthopedic needs arise.    Thank you for including [facility] in the care of Mary Martino. It has been a pleasure participating in her care.      Evaristo Larsen PA-C  [facility]    Active Problems:    Humerus surgical neck fracture       Chief Complaint  Left humerus fracture     HPI  The patient is seen in orthopedic consultation at the request of Ora Pugh PA-C.  The patient is a 74 year old female with moderate pain of the left  shoulder. The patient has a medical history  significant for right partial shoulder replacement, bilateral JAHAIRA, chronic left shoulder pain, CKD, HTN and GERD. On 5/23 she tripped in her kitchen at home and fell, landing on her left side. She was brought to the [facility] ED where xrays showed a mildly displaced proximal humerus fracture and she was placed in a sling. She was admitted to [facility] for further management. Currently she reports mild pain in the left shoulder, denies pain elsewhere in the arm and denies numbness or tingling in the fingers. Her hand feels a little swollen and she is feeling nauseous secondary to the oxycodone.      History is obtained from the patient and electronic health record     Past Medical History  Past Medical History:   Diagnosis Date    Depression     GERD (gastroesophageal reflux disease)     Hyperlipidemia     Hypertension     Hypothyroidism     h/o grave's disease    Leiomyoma of uterus     PONV (postoperative nausea and vomiting)     Seasonal allergies     Ventral hernia        Surgical History  Past Surgical History:   Procedure Laterality Date    APPENDECTOMY      CHOLECYSTECTOMY      COLON SURGERY  2009    partial colon resection    EYE SURGERY      for proptosis    HEMIARTHROPLASTY SHOULDER FRACTURE Right     HERNIA REPAIR      HERNIORRHAPHY VENTRAL N/A 3/13/2018    Procedure: VENTRAL HERNIA REPAIR;  Surgeon: Josue Love MD;  [place] [facility] [place]  Service:     HUMERUS FRACTURE SURGERY      HYSTERECTOMY Bilateral 3/13/2018    Procedure: EXPLORATORY LAPAROTOMY TOTAL ABDOMINAL HYSTERECTOMY, BILATERAL SALPINGO OOPHORECTOMY PELVIC WASHINGS LYSIS OF ADHESIONS;  Surgeon: Sheri Zamarripa MD;  [place] [facility] [place]  Service:     THYROID SURGERY      ablation    TUBAL LIGATION          Social History  Social History     Socioeconomic History    Marital status:      Spouse name: Not on file    Number of children: Not on file    Years of education: Not on file    Highest education level: Not  on file   Occupational History    Not on file   Tobacco Use    Smoking status: Former     Current packs/day: 0.00     Types: Cigarettes     Quit date: 2000     Years since quittin.4    Smokeless tobacco: Never   Substance and Sexual Activity    Alcohol use: Yes     Comment: Alcoholic Drinks/day: 1 drink a month     Drug use: No    Sexual activity: Not on file   Other Topics Concern    Not on file   Social History Narrative    Not on file     Social Determinants of Health     Financial Resource Strain: Not on file   Food Insecurity: Not on file   Transportation Needs: Not on file   Physical Activity: Not on file   Stress: Not on file   Social Connections: Not on file   Interpersonal Safety: Not on file   Housing Stability: Not on file       Family History  Family History   Problem Relation Age of Onset    Cancer Father 76.00        brain    Breast Cancer No family hx of         Allergies:  Amlodipine, Codeine, and Penicillins      Current Medications:  Current Facility-Administered Medications   Medication Dose Route Frequency Provider Last Rate Last Admin    acetaminophen (TYLENOL) tablet 975 mg  975 mg Oral Q6H Jeff Pugh PA-C   975 mg at 24 0856    Or    acetaminophen (TYLENOL) Suppository 650 mg  650 mg Rectal Q6H Jeff Pugh PA-C        aspirin EC tablet 81 mg  81 mg Oral Daily Jeff Pugh PA-C   81 mg at 24 0857    carboxymethylcellulose PF (REFRESH PLUS) 0.5 % ophthalmic solution 1 drop  1 drop Both Eyes Q1H PRN Jeff Pugh PA-C        levothyroxine (SYNTHROID/LEVOTHROID) tablet 100 mcg  100 mcg Oral QAM AC Jeff Pugh PA-C   100 mcg at 24 0602    lisinopril-hydrochlorothiazide (ZESTORETIC) 20-12.5 MG per tablet 1 tablet  1 tablet Oral Daily Jeff Pugh PA-C   1 tablet at 24 0859    naloxone (NARCAN) injection 0.2 mg  0.2 mg Intravenous Q2 Min PRN Johny Ritchie MD        Or    naloxone (NARCAN) injection 0.4 mg  0.4 mg Intravenous Q2 Min PRN  Johny Ritchie MD        Or    naloxone (NARCAN) injection 0.2 mg  0.2 mg Intramuscular Q2 Min PRN Johny Ritchie MD        Or    naloxone (NARCAN) injection 0.4 mg  0.4 mg Intramuscular Q2 Min PRN Johny Ritchie MD        naproxen (NAPROSYN) tablet 250 mg  250 mg Oral Daily with breakfast Jeff Pugh PA-C   250 mg at 05/24/24 0857    ondansetron (ZOFRAN ODT) ODT tab 4 mg  4 mg Oral Q6H PRN Jeff Pugh PA-C   4 mg at 05/24/24 0856    Or    ondansetron (ZOFRAN) injection 4 mg  4 mg Intravenous Q6H PRN Jeff Pugh PA-C        oxyCODONE (ROXICODONE) tablet 5 mg  5 mg Oral Q4H PRN Jeff Pugh PA-C   5 mg at 05/24/24 0607    oxyCODONE IR (ROXICODONE) half-tab 2.5 mg  2.5 mg Oral Q4H PRN Jeff Pugh PA-C        pantoprazole (PROTONIX) EC tablet 40 mg  40 mg Oral BID AC Johny Ritchie MD   40 mg at 05/24/24 0602    pravastatin (PRAVACHOL) tablet 40 mg  40 mg Oral At Bedtime Jeff Pugh PA-C   40 mg at 05/23/24 2151    senna-docusate (SENOKOT-S/PERICOLACE) 8.6-50 MG per tablet 1 tablet  1 tablet Oral BID PRN Jeff Pugh PA-C        Or    senna-docusate (SENOKOT-S/PERICOLACE) 8.6-50 MG per tablet 2 tablet  2 tablet Oral BID PRN Jeff Pugh PA-C        senna-docusate (SENOKOT-S/PERICOLACE) 8.6-50 MG per tablet 1 tablet  1 tablet Oral BID Jeff Pugh PA-C        Or    senna-docusate (SENOKOT-S/PERICOLACE) 8.6-50 MG per tablet 2 tablet  2 tablet Oral BID Jeff Pugh PA-C           Review of Systems:  See H&P    Physical Exam:  Temp:  [97.8  F (36.6  C)-98.1  F (36.7  C)] 97.8  F (36.6  C)  Pulse:  [] 88  Resp:  [15-35] 16  BP: (115-171)/(71-95) 119/76  SpO2:  [93 %-100 %] 93 %    General: On examination, the patient is resting comfortably, NAD, awake, and alert and oriented to person, place, time, and general circumstances  SKIN: Mild diffuse swelling in the left upper arm without ecchymosis, deformity or open wounds.   Pulses:  Fingers are warm and well perfused,  left radial pulse intact.  Sensation: intact and equal bilaterally to the distal upper extremities.  Tenderness: Mild TTP at the left proximal humerus  ROM: FROM of the left fingers and wrist     Pertinent Labs  Lab Results: personally reviewed.  Lab Results   Component Value Date    WBC 10.9 05/24/2024    HGB 11.4 (L) 05/24/2024    HCT 33.6 (L) 05/24/2024    MCV 85 05/24/2024     05/24/2024       Pertinent Radiology  Radiology Results: images and radiology report reviewed  Recent Results (from the past 24 hour(s))   Shoulder XR, left  2-3 vw PORTABLE    Narrative    EXAM: XR SHOULDER LEFT PORT G/E 2 VIEWS  LOCATION: [facility]  DATE: 5/23/2024    INDICATION: shoulder pain fall  COMPARISON: None.      Impression    IMPRESSION: Acute mildly displaced fracture of the surgical neck of the humerus. No evidence of additional fractures. Osteopenia. Advanced degenerative changes at the glenohumeral joint.                      XR Humerus Port Left G/E 2 Views    Narrative    EXAM: XR HUMERUS PORT LEFT G/E 2 VIEWS  LOCATION: [facility]  DATE: 5/23/2024    INDICATION: Left arm pain. Recent fall.  COMPARISON: None.      Impression    IMPRESSION: Acute mildly displaced fracture of the surgical neck of the humerus. Osteopenia. Advanced degenerative changes at the glenohumeral joint.                                             Attestation:  I have reviewed today's vital signs, notes, medications, labs and imaging.     Evaristo Larsen PA-C

## 2024-05-24 NOTE — PLAN OF CARE
[facility] DISCHARGE NOTE    Patient discharged to home at 1245 PMvia wheel chair. Accompanied by spouse and staff. Discharge instructions reviewed with patient and spouse, opportunity offered to ask questions. Prescriptions sent to patients preferred pharmacy. All belongings sent with patient.    Lola Holden RN

## 2024-05-24 NOTE — PROGRESS NOTES
"   05/24/24 1000   Appointment Info   Signing Clinician's Name / Credentials (OT) Catherine Giles OTR/L   Quick Adds   Quick Adds Certification       Present no   Living Environment   People in Home spouse   Current Living Arrangements house   Home Accessibility stairs to enter home;stairs within home  (all needs met on the main level)   Number of Stairs, Main Entrance 2   Stair Railings, Main Entrance railings safe and in good condition;railings on both sides of stairs   Number of Stairs, Within Home, Primary none   Transportation Anticipated family or friend will provide   Living Environment Comments All needs met on the main level, doesn't utilize cane or walker at baseline.   Self-Care   Usual Activity Tolerance moderate   Current Activity Tolerance fair   Regular Exercise No   Equipment Currently Used at Home walker, standard;raised toilet seat;cane, straight  (reacher)   Fall history within last six months yes   Number of times patient has fallen within last six months 1   Activity/Exercise/Self-Care Comment At baseline, pt is IND in all ADLs   Instrumental Activities of Daily Living (IADL)   Previous Responsibilities meal prep;housekeeping;laundry;yardwork;driving;medication management;finances   IADL Comments At baseline, IND in all IADLs prior to fall.   General Information   Onset of Illness/Injury or Date of Surgery 05/23/24   Referring Physician Jackie Lechuga MD   Patient/Family Therapy Goal Statement (OT) to return home safely   Additional Occupational Profile Info/Pertinent History of Current Problem As per chart; \"Mary Martino is a 74 year old female who presents on 5/23/24 with left humerus fracture secondary to ground level fall. She is being admitted for pain control\".   Existing Precautions/Restrictions weight bearing;fall   Left Upper Extremity (Weight-bearing Status) non weight-bearing (NWB)   Right Upper Extremity (Weight-bearing Status) full weight-bearing (FWB)   Left " "Lower Extremity (Weight-bearing Status) full weight-bearing (FWB)   Right Lower Extremity (Weight-bearing Status) full weight-bearing (FWB)   Cognitive Status Examination   Orientation Status orientation to person, place and time   Behavioral Issues other (see comments)  (pleasant)   Affect/Mental Status (Cognitive) WFL   Follows Commands WFL   Safety Deficit minimal deficit   Visual Perception   Visual Impairment/Limitations WFL   Sensory   Sensory Quick Adds sensation intact   Pain Assessment   Patient Currently in Pain Yes, see Vital Sign flowsheet   Posture   Posture not impaired   Range of Motion Comprehensive   General Range of Motion upper extremity range of motion deficits identified   Strength Comprehensive (MMT)   General Manual Muscle Testing (MMT) Assessment upper extremity strength deficits identified   Coordination   Upper Extremity Coordination Left UE impaired  (due to fracture)   Bed Mobility   Bed Mobility No deficits identified  (As per pt; \"IND in bed mobility\".)   Transfers   Transfers No deficits identified   Balance   Balance Assessment no deficits identified  (unable to assess)   Activities of Daily Living   BADL Assessment/Intervention upper body dressing;clothing fastener management;toileting   Upper Body Dressing Assessment/Training   [place] Level (Upper Body Dressing) unable to assess   Clothes Fastener Management   [place] Level (Clothes Fastener Management) unable to assess   Toileting   [place] Level (Toileting) unable to assess   Clinical Impression   Criteria for Skilled Therapeutic Interventions Met (OT) Yes, treatment indicated   OT Diagnosis ADL impairment due to humerus surgical neck fracture   OT Problem List-Impairments impacting ADL problems related to;activity tolerance impaired;range of motion (ROM);strength;other (see comments)  (fracture)   Assessment of Occupational Performance 1-3 Performance Deficits   Identified Performance Deficits UB dressing, " toileting, grooming & hygiene   Planned Therapy Interventions (OT) ADL retraining;home program guidelines   Clinical Decision Making Complexity (OT) problem focused assessment/low complexity   Risk & Benefits of therapy have been explained evaluation/treatment results reviewed;care plan/treatment goals reviewed;risks/benefits reviewed;current/potential barriers reviewed;participants voiced agreement with care plan;participants included;patient;spouse/significant other   OT Total Evaluation Time   OT Eval, Low Complexity Minutes (10883) 15   Therapy Certification   Medical Diagnosis Humerus surgical neck fracture   Start of Care Date 05/24/24   Certification date from 05/24/24   Certification date to 05/31/24   OT Goals   Therapy Frequency (OT) 5 times/week   OT Predicted Duration/Target Date for Goal Attainment 05/24/24   OT Goals Upper Body Dressing;Lower Body Dressing;Toilet Transfer/Toileting;OT Goal 1   OT: Upper Body Dressing Modified independent;Minimal assist   OT: Lower Body Dressing Modified independent   OT: Toilet Transfer/Toileting Modified independent   OT: Goal 1 Pt will verbalize and demonstrate understanding of HEP to wear sling at all times except for hygiene and elbow exercises.   Interventions   Interventions Quick Adds Self-Care/Home Management;Therapeutic Procedures/Exercise   Self-Care/Home Management   Self-Care/Home Mgmt/ADL, Compensatory, Meal Prep Minutes (71610) 10   Symptoms Noted During/After Treatment (Meal Preparation/Planning Training) none   Treatment Detail/Skilled Intervention Education provided on how to don/doff sling, educated on sling at all times except for hygiene and elbow exercises. Pt verbalizes understanding. Education provided (verbal, demo, handout) on one-arm dressing technique. Pt refused to don button-up shirt, wants to keep hospital gown. Provided recommendation to wear loose-fitting clothes to increase indep with dressing.   Therapeutic Procedures/Exercise    Therapeutic Procedure: strength, endurance, ROM, flexibillity minutes (90426) 10   Symptoms Noted During/After Treatment none   Treatment Detail/Skilled Intervention Educated on sling at all times except for hygiene (skin checks) and elbow exercises.Education provided on exercises forearm sup/pron, wrist flex/ext, wrist uln/rad deviation and fist pumps.Pt instructed to complete exercises 2x  daily, modify or stop if painful, apply ice afterwards. Handout provided.   OT Discharge Planning   OT Plan 5x/week   OT Discharge Recommendation (DC Rec) home with assist;home with home care occupational therapy   OT Rationale for DC Rec Pt has a supportive spouse, can provide assistance upon discharge. Pt is moving well, pt is safe to discharge home.   OT Brief overview of current status SBA for functional mobility   OT Equipment Needed at Discharge other (see comments)   Total Session Time   Timed Code Treatment Minutes 20   Total Session Time (sum of timed and untimed services) 35   Psychosocial Support   Trust Relationship/Rapport care explained;choices provided;emotional support provided;questions answered

## 2024-05-24 NOTE — PROGRESS NOTES
Care Management:    The patient had a humerus fracture with repair.    Chart reviewed and plan of care discussed in Interdisciplinary Rounds.  Met with the Patient and , Polo.  The patient lives independently in the community.  There are no discharge needs identified.    Plan:  Home       Hallie Dumont RN, Care Coordinator [phone]

## 2024-05-24 NOTE — PLAN OF CARE
Problem: Pain Acute  Goal: Optimal Pain Control and Function  Outcome: Progressing     Problem: Gas Exchange Impaired  Goal: Optimal Gas Exchange  Outcome: Progressing   Goal Outcome Evaluation:  A&O, standby assist and able to make needs known.   When pt arrived she was on 4L O2. She is now stable on RA O2 92%. Pt was able to ambulate to bathroom with minimal assist and reported no SOB. Has had some pain and soreness in L shoulder. Pain controled with scheduled tylenol and PRN oxycodone. Successful results.

## 2024-05-25 LAB
ATRIAL RATE - MUSE: 70 BPM
DIASTOLIC BLOOD PRESSURE - MUSE: NORMAL MMHG
INTERPRETATION ECG - MUSE: NORMAL
P AXIS - MUSE: 81 DEGREES
PR INTERVAL - MUSE: 206 MS
QRS DURATION - MUSE: 76 MS
QT - MUSE: 422 MS
QTC - MUSE: 455 MS
R AXIS - MUSE: 86 DEGREES
SYSTOLIC BLOOD PRESSURE - MUSE: NORMAL MMHG
T AXIS - MUSE: 83 DEGREES
VENTRICULAR RATE- MUSE: 70 BPM

## 2024-05-28 LAB
BACTERIA BLD CULT: NO GROWTH
BACTERIA BLD CULT: NO GROWTH

## 2024-06-04 NOTE — H&P (VIEW-ONLY)
"GENERAL SURGICAL CONSULTATION    I was requested by Misha Alonzo to consult on this pt to evaluate them for abdominal pain.    HPI:  This is a 74 year old female here today with abdominal pain. The pain is made worse by eating.  The pain is just above the umbilicus slightly Right of center.  I did a laparoscopic ventral hernia repair with a 5\" x 7\" ventreo mesh 3/13/2018 that covers the approximate area where she is having pain.         She also has early satiety and on her CT scan it was noted that she has a Thickened Stomach.  She does and has taken Omeprazole for a long time.           Allergies:Amlodipine, Codeine, and Penicillins    Past Medical History:   Diagnosis Date    Depression     GERD (gastroesophageal reflux disease)     Hyperlipidemia     Hypertension     Hypothyroidism     h/o grave's disease    Leiomyoma of uterus     PONV (postoperative nausea and vomiting)     Seasonal allergies     Ventral hernia        Past Surgical History:   Procedure Laterality Date    APPENDECTOMY      CHOLECYSTECTOMY      COLON SURGERY  2009    partial colon resection    EYE SURGERY      for proptosis    HEMIARTHROPLASTY SHOULDER FRACTURE Right     HERNIA REPAIR      HERNIORRHAPHY VENTRAL N/A 3/13/2018    Procedure: VENTRAL HERNIA REPAIR;  Surgeon: Josue Love MD;  Location: [facility] [place];  Service:     HUMERUS FRACTURE SURGERY      HYSTERECTOMY Bilateral 3/13/2018    Procedure: EXPLORATORY LAPAROTOMY TOTAL ABDOMINAL HYSTERECTOMY, BILATERAL SALPINGO OOPHORECTOMY PELVIC WASHINGS LYSIS OF ADHESIONS;  Surgeon: Sheri Zamarripa MD;  Location: [facility] [place];  Service:     THYROID SURGERY      ablation    TUBAL LIGATION         CURRENT MEDS:  Current Outpatient Medications   Medication Sig Dispense Refill    acetaminophen (TYLENOL) 325 MG tablet Take 2 tablets (650 mg) by mouth every 6 hours 120 tablet 1    aspirin 81 MG EC tablet [ASPIRIN 81 MG EC TABLET] Take 81 mg by mouth daily.      betamethasone " "valerate (VALISONE) 0.1 % cream [BETAMETHASONE VALERATE (VALISONE) 0.1 % CREAM] Apply 1 application topically 2 (two) times a day as needed.       calcium carbonate (OS-VADIM) 600 mg calcium (1,500 mg) tablet [CALCIUM CARBONATE (OS-VADIM) 600 MG CALCIUM (1,500 MG) TABLET] Take 600 mg by mouth daily.       cholecalciferol, vitamin D3, 1,000 unit tablet [CHOLECALCIFEROL, VITAMIN D3, 1,000 UNIT TABLET] Take 1,000 Units by mouth daily.       Flaxseed, Linseed, (FLAX SEED OIL) 1000 MG capsule Take 1 capsule by mouth daily      levothyroxine (SYNTHROID/LEVOTHROID) 100 MCG tablet Take 100 mcg by mouth daily      lisinopril-hydrochlorothiazide (ZESTORETIC) 20-12.5 MG tablet Take 1 tablet by mouth daily      naproxen sodium (ALEVE) 220 MG tablet Take 220 mg by mouth daily (with breakfast)      omeprazole (PRILOSEC) 40 MG capsule [OMEPRAZOLE (PRILOSEC) 40 MG CAPSULE] Take 40 mg by mouth daily.       ondansetron (ZOFRAN ODT) 4 MG ODT tab Take 1 tablet (4 mg) by mouth every 6 hours as needed for nausea or vomiting 30 tablet 0    oxyCODONE (ROXICODONE) 5 MG tablet Take 1 tablet (5 mg) by mouth every 4 hours as needed for severe pain (IF pain not managed with non-pharmacological and non-opioid interventions) 20 tablet 0    pravastatin (PRAVACHOL) 40 MG tablet [PRAVASTATIN (PRAVACHOL) 40 MG TABLET] Take 40 mg by mouth at bedtime.          Family History   Problem Relation Age of Onset    Cancer Father 76.00        brain    Breast Cancer No family hx of      Family history is not pertinent to this patients Chief Complaint.     reports that she quit smoking about 24 years ago. She has never used smokeless tobacco. She reports current alcohol use. She reports that she does not use drugs.    Review of Systems -   10 point Review of systems is negative except for; as mentioned above in HPI and PMHx    /74   Ht 1.549 m (5' 1\")   Wt 66.2 kg (146 lb)   BMI 27.59 kg/m       EXAM:  GENERAL: Well developed female  HEENT: EOMI, Anicteric " Sclera, Moist Mucous Membranes,  In Mouth the pt does not have redness or bleeding gums  CARDIOVASCULAR: RRR w/out murmur   CHEST/LUNG: Clear to Auscultation  ABDOMEN:  Mildly tender to palpation just above the umbilicus, +BS, No palpable hernia.  MUSCULOSKELETAL:  Broken Left arm, now in a sling.  No deformities with good range of motion in all extremities  NEURO: She is ambulatory with good strength in both legs.  HEME/LYMPH: No Cervical Adenopathy or tenderness.     IMAGES:  I evaluated these images myself and her CT from June 2023 does not show any hernia's.  There is a loop of colon that comes in close approximation to where I believe the mesh is located.      EXAM: CT ABDOMEN PELVIS W CONTRAST  LOCATION: [facility]  DATE: 5/23/2024     INDICATION: tachypnea, fall, leukcytosis, transient hypotension, lactic acid 3.5, diarrhea  COMPARISON: CT 5/22/2024 and CT 2/15/2018.  TECHNIQUE: CT scan of the abdomen and pelvis was performed following injection of IV contrast. Multiplanar reformats were obtained. Dose reduction techniques were used.  CONTRAST: IsoVue 370 75mL     FINDINGS:   LOWER CHEST: Normal.     HEPATOBILIARY: Normal.     PANCREAS: Normal.     SPLEEN: Normal.     ADRENAL GLANDS: Nodularity in the adrenal glands more prominent on the right unchanged since 2018 can be considered benign adrenal adenomas. No follow-up needed for these.     KIDNEYS/BLADDER: Normal.     BOWEL: Generalized thickening of the stomach similar to previous suggestive of gastritis. Postop change right side of the colon. Moderate diverticulosis in the colon but nothing for acute inflammatory change.     LYMPH NODES: Normal.     VASCULATURE: No aneurysms.     PELVIC ORGANS: Normal.     MUSCULOSKELETAL: Bilateral total hip arthroplasties. No concerning bone lesion.                                                                      IMPRESSION:   1.  Generalized thickening of the stomach similar to recent CT  from yesterday. Possibly from some generalized gastritis.     2.  No significant new finding.    Assessment/Plan:  Abdominal pain in a person who had a prior ventral hernia.  The pain seems to be worse with eating.  Her CT shows a thickened stomach.  She also has a small lower abdominal hernia 18 mm in the midline that is likely at the lower pole of the mesh. I would like to evaluate her stomach with an EGD.  I would not recommend surgery for treatment for the small hernia at this time.    EGD.    Josue Love MD  [facility]  [phone]

## 2024-06-04 NOTE — PROGRESS NOTES
"GENERAL SURGICAL CONSULTATION    I was requested by Misha Alonzo to consult on this pt to evaluate them for abdominal pain.    HPI:  This is a 74 year old female here today with abdominal pain. The pain is made worse by eating.  The pain is just above the umbilicus slightly Right of center.  I did a laparoscopic ventral hernia repair with a 5\" x 7\" ventreo mesh 3/13/2018 that covers the approximate area where she is having pain.         She also has early satiety and on her CT scan it was noted that she has a Thickened Stomach.  She does and has taken Omeprazole for a long time.           Allergies:Amlodipine, Codeine, and Penicillins    Past Medical History:   Diagnosis Date    Depression     GERD (gastroesophageal reflux disease)     Hyperlipidemia     Hypertension     Hypothyroidism     h/o grave's disease    Leiomyoma of uterus     PONV (postoperative nausea and vomiting)     Seasonal allergies     Ventral hernia        Past Surgical History:   Procedure Laterality Date    APPENDECTOMY      CHOLECYSTECTOMY      COLON SURGERY  2009    partial colon resection    EYE SURGERY      for proptosis    HEMIARTHROPLASTY SHOULDER FRACTURE Right     HERNIA REPAIR      HERNIORRHAPHY VENTRAL N/A 3/13/2018    Procedure: VENTRAL HERNIA REPAIR;  Surgeon: Josue Love MD;  [place] [facility] [place]:     HUMERUS FRACTURE SURGERY      HYSTERECTOMY Bilateral 3/13/2018    Procedure: EXPLORATORY LAPAROTOMY TOTAL ABDOMINAL HYSTERECTOMY, BILATERAL SALPINGO OOPHORECTOMY PELVIC WASHINGS LYSIS OF ADHESIONS;  Surgeon: Sheri Zamarripa MD;  [place] [facility] [place]  Service:     THYROID SURGERY      ablation    TUBAL LIGATION         CURRENT MEDS:  Current Outpatient Medications   Medication Sig Dispense Refill    acetaminophen (TYLENOL) 325 MG tablet Take 2 tablets (650 mg) by mouth every 6 hours 120 tablet 1    aspirin 81 MG EC tablet [ASPIRIN 81 MG EC TABLET] Take 81 mg by mouth daily.      betamethasone " "valerate (VALISONE) 0.1 % cream [BETAMETHASONE VALERATE (VALISONE) 0.1 % CREAM] Apply 1 application topically 2 (two) times a day as needed.       calcium carbonate (OS-VADIM) 600 mg calcium (1,500 mg) tablet [CALCIUM CARBONATE (OS-VADIM) 600 MG CALCIUM (1,500 MG) TABLET] Take 600 mg by mouth daily.       cholecalciferol, vitamin D3, 1,000 unit tablet [CHOLECALCIFEROL, VITAMIN D3, 1,000 UNIT TABLET] Take 1,000 Units by mouth daily.       Flaxseed, Linseed, (FLAX SEED OIL) 1000 MG capsule Take 1 capsule by mouth daily      levothyroxine (SYNTHROID/LEVOTHROID) 100 MCG tablet Take 100 mcg by mouth daily      lisinopril-hydrochlorothiazide (ZESTORETIC) 20-12.5 MG tablet Take 1 tablet by mouth daily      naproxen sodium (ALEVE) 220 MG tablet Take 220 mg by mouth daily (with breakfast)      omeprazole (PRILOSEC) 40 MG capsule [OMEPRAZOLE (PRILOSEC) 40 MG CAPSULE] Take 40 mg by mouth daily.       ondansetron (ZOFRAN ODT) 4 MG ODT tab Take 1 tablet (4 mg) by mouth every 6 hours as needed for nausea or vomiting 30 tablet 0    oxyCODONE (ROXICODONE) 5 MG tablet Take 1 tablet (5 mg) by mouth every 4 hours as needed for severe pain (IF pain not managed with non-pharmacological and non-opioid interventions) 20 tablet 0    pravastatin (PRAVACHOL) 40 MG tablet [PRAVASTATIN (PRAVACHOL) 40 MG TABLET] Take 40 mg by mouth at bedtime.          Family History   Problem Relation Age of Onset    Cancer Father 76.00        brain    Breast Cancer No family hx of      Family history is not pertinent to this patients Chief Complaint.     reports that she quit smoking about 24 years ago. She has never used smokeless tobacco. She reports current alcohol use. She reports that she does not use drugs.    Review of Systems -   10 point Review of systems is negative except for; as mentioned above in HPI and PMHx    /74   Ht 1.549 m (5' 1\")   Wt 66.2 kg (146 lb)   BMI 27.59 kg/m       EXAM:  GENERAL: Well developed female  HEENT: EOMI, Anicteric " Sclera, Moist Mucous Membranes,  In Mouth the pt does not have redness or bleeding gums  CARDIOVASCULAR: RRR w/out murmur   CHEST/LUNG: Clear to Auscultation  ABDOMEN:  Mildly tender to palpation just above the umbilicus, +BS, No palpable hernia.  MUSCULOSKELETAL:  Broken Left arm, now in a sling.  No deformities with good range of motion in all extremities  NEURO: She is ambulatory with good strength in both legs.  HEME/LYMPH: No Cervical Adenopathy or tenderness.     IMAGES:  I evaluated these images myself and her CT from June 2023 does not show any hernia's.  There is a loop of colon that comes in close approximation to where I believe the mesh is located.      EXAM: CT ABDOMEN PELVIS W CONTRAST  LOCATION: [facility]  DATE: 5/23/2024     INDICATION: tachypnea, fall, leukcytosis, transient hypotension, lactic acid 3.5, diarrhea  COMPARISON: CT 5/22/2024 and CT 2/15/2018.  TECHNIQUE: CT scan of the abdomen and pelvis was performed following injection of IV contrast. Multiplanar reformats were obtained. Dose reduction techniques were used.  CONTRAST: IsoVue 370 75mL     FINDINGS:   LOWER CHEST: Normal.     HEPATOBILIARY: Normal.     PANCREAS: Normal.     SPLEEN: Normal.     ADRENAL GLANDS: Nodularity in the adrenal glands more prominent on the right unchanged since 2018 can be considered benign adrenal adenomas. No follow-up needed for these.     KIDNEYS/BLADDER: Normal.     BOWEL: Generalized thickening of the stomach similar to previous suggestive of gastritis. Postop change right side of the colon. Moderate diverticulosis in the colon but nothing for acute inflammatory change.     LYMPH NODES: Normal.     VASCULATURE: No aneurysms.     PELVIC ORGANS: Normal.     MUSCULOSKELETAL: Bilateral total hip arthroplasties. No concerning bone lesion.                                                                      IMPRESSION:   1.  Generalized thickening of the stomach similar to recent CT  from yesterday. Possibly from some generalized gastritis.     2.  No significant new finding.    Assessment/Plan:  Abdominal pain in a person who had a prior ventral hernia.  The pain seems to be worse with eating.  Her CT shows a thickened stomach.  She also has a small lower abdominal hernia 18 mm in the midline that is likely at the lower pole of the mesh. I would like to evaluate her stomach with an EGD.  I would not recommend surgery for treatment for the small hernia at this time.    EGD.    Josue Love MD  [facility]  [phone]

## 2024-06-05 ENCOUNTER — HOSPITAL ENCOUNTER (OUTPATIENT)
Facility: AMBULATORY SURGERY CENTER | Age: 74
End: 2024-06-05
Attending: SURGERY
Payer: COMMERCIAL

## 2024-06-05 ENCOUNTER — OFFICE VISIT (OUTPATIENT)
Dept: SURGERY | Facility: CLINIC | Age: 74
End: 2024-06-05
Payer: COMMERCIAL

## 2024-06-05 VITALS
DIASTOLIC BLOOD PRESSURE: 74 MMHG | SYSTOLIC BLOOD PRESSURE: 120 MMHG | HEIGHT: 61 IN | BODY MASS INDEX: 27.56 KG/M2 | WEIGHT: 146 LBS

## 2024-06-05 DIAGNOSIS — K31.89 GASTRIC WALL THICKENING: Primary | ICD-10-CM

## 2024-06-05 PROCEDURE — 99214 OFFICE O/P EST MOD 30 MIN: CPT | Performed by: SURGERY

## 2024-06-05 NOTE — LETTER
"6/5/2024      Mary Martino  [address]  [place] [place] [address]      Dear Colleague,    Thank you for referring your patient, Mary Martino, to the [facility]. Please see a copy of my visit note below.    GENERAL SURGICAL CONSULTATION    I was requested by Misha Alonzo to consult on this pt to evaluate them for abdominal pain.    HPI:  This is a 74 year old female here today with abdominal pain. The pain is made worse by eating.  The pain is just above the umbilicus slightly Right of center.  I did a laparoscopic ventral hernia repair with a 5\" x 7\" ventreo mesh 3/13/2018 that covers the approximate area where she is having pain.         She also has early satiety and on her CT scan it was noted that she has a Thickened Stomach.  She does and has taken Omeprazole for a long time.           Allergies:Amlodipine, Codeine, and Penicillins    Past Medical History:   Diagnosis Date     Depression      GERD (gastroesophageal reflux disease)      Hyperlipidemia      Hypertension      Hypothyroidism     h/o grave's disease     Leiomyoma of uterus      PONV (postoperative nausea and vomiting)      Seasonal allergies      Ventral hernia        Past Surgical History:   Procedure Laterality Date     APPENDECTOMY       CHOLECYSTECTOMY       COLON SURGERY  2009    partial colon resection     EYE SURGERY      for proptosis     HEMIARTHROPLASTY SHOULDER FRACTURE Right      HERNIA REPAIR       HERNIORRHAPHY VENTRAL N/A 3/13/2018    Procedure: VENTRAL HERNIA REPAIR;  Surgeon: Josue Love MD;  [facility] [place];  Service:      HUMERUS FRACTURE SURGERY       HYSTERECTOMY Bilateral 3/13/2018    Procedure: EXPLORATORY LAPAROTOMY TOTAL ABDOMINAL HYSTERECTOMY, BILATERAL SALPINGO OOPHORECTOMY PELVIC WASHINGS LYSIS OF ADHESIONS;  Surgeon: Sheri Zamarripa MD;  [facility] [place];  Service:      THYROID SURGERY      ablation     TUBAL LIGATION   "       CURRENT MEDS:  Current Outpatient Medications   Medication Sig Dispense Refill     acetaminophen (TYLENOL) 325 MG tablet Take 2 tablets (650 mg) by mouth every 6 hours 120 tablet 1     aspirin 81 MG EC tablet [ASPIRIN 81 MG EC TABLET] Take 81 mg by mouth daily.       betamethasone valerate (VALISONE) 0.1 % cream [BETAMETHASONE VALERATE (VALISONE) 0.1 % CREAM] Apply 1 application topically 2 (two) times a day as needed.        calcium carbonate (OS-VADIM) 600 mg calcium (1,500 mg) tablet [CALCIUM CARBONATE (OS-VADIM) 600 MG CALCIUM (1,500 MG) TABLET] Take 600 mg by mouth daily.        cholecalciferol, vitamin D3, 1,000 unit tablet [CHOLECALCIFEROL, VITAMIN D3, 1,000 UNIT TABLET] Take 1,000 Units by mouth daily.        Flaxseed, Linseed, (FLAX SEED OIL) 1000 MG capsule Take 1 capsule by mouth daily       levothyroxine (SYNTHROID/LEVOTHROID) 100 MCG tablet Take 100 mcg by mouth daily       lisinopril-hydrochlorothiazide (ZESTORETIC) 20-12.5 MG tablet Take 1 tablet by mouth daily       naproxen sodium (ALEVE) 220 MG tablet Take 220 mg by mouth daily (with breakfast)       omeprazole (PRILOSEC) 40 MG capsule [OMEPRAZOLE (PRILOSEC) 40 MG CAPSULE] Take 40 mg by mouth daily.        ondansetron (ZOFRAN ODT) 4 MG ODT tab Take 1 tablet (4 mg) by mouth every 6 hours as needed for nausea or vomiting 30 tablet 0     oxyCODONE (ROXICODONE) 5 MG tablet Take 1 tablet (5 mg) by mouth every 4 hours as needed for severe pain (IF pain not managed with non-pharmacological and non-opioid interventions) 20 tablet 0     pravastatin (PRAVACHOL) 40 MG tablet [PRAVASTATIN (PRAVACHOL) 40 MG TABLET] Take 40 mg by mouth at bedtime.          Family History   Problem Relation Age of Onset     Cancer Father 76.00        brain     Breast Cancer No family hx of      Family history is not pertinent to this patients Chief Complaint.     reports that she quit smoking about 24 years ago. She has never used smokeless tobacco. She reports current alcohol  "use. She reports that she does not use drugs.    Review of Systems -   10 point Review of systems is negative except for; as mentioned above in HPI and PMHx    /74   Ht 1.549 m (5' 1\")   Wt 66.2 kg (146 lb)   BMI 27.59 kg/m       EXAM:  GENERAL: Well developed female  HEENT: EOMI, Anicteric Sclera, Moist Mucous Membranes,  In Mouth the pt does not have redness or bleeding gums  CARDIOVASCULAR: RRR w/out murmur   CHEST/LUNG: Clear to Auscultation  ABDOMEN:  Mildly tender to palpation just above the umbilicus, +BS, No palpable hernia.  MUSCULOSKELETAL:  Broken Left arm, now in a sling.  No deformities with good range of motion in all extremities  NEURO: She is ambulatory with good strength in both legs.  HEME/LYMPH: No Cervical Adenopathy or tenderness.     IMAGES:  I evaluated these images myself and her CT from June 2023 does not show any hernia's.  There is a loop of colon that comes in close approximation to where I believe the mesh is located.      EXAM: CT ABDOMEN PELVIS W CONTRAST  LOCATION: [facility]  DATE: 5/23/2024     INDICATION: tachypnea, fall, leukcytosis, transient hypotension, lactic acid 3.5, diarrhea  COMPARISON: CT 5/22/2024 and CT 2/15/2018.  TECHNIQUE: CT scan of the abdomen and pelvis was performed following injection of IV contrast. Multiplanar reformats were obtained. Dose reduction techniques were used.  CONTRAST: IsoVue 370 75mL     FINDINGS:   LOWER CHEST: Normal.     HEPATOBILIARY: Normal.     PANCREAS: Normal.     SPLEEN: Normal.     ADRENAL GLANDS: Nodularity in the adrenal glands more prominent on the right unchanged since 2018 can be considered benign adrenal adenomas. No follow-up needed for these.     KIDNEYS/BLADDER: Normal.     BOWEL: Generalized thickening of the stomach similar to previous suggestive of gastritis. Postop change right side of the colon. Moderate diverticulosis in the colon but nothing for acute inflammatory change.     LYMPH " NODES: Normal.     VASCULATURE: No aneurysms.     PELVIC ORGANS: Normal.     MUSCULOSKELETAL: Bilateral total hip arthroplasties. No concerning bone lesion.                                                                      IMPRESSION:   1.  Generalized thickening of the stomach similar to recent CT from yesterday. Possibly from some generalized gastritis.     2.  No significant new finding.    Assessment/Plan:  Abdominal pain in a person who had a prior ventral hernia.  The pain seems to be worse with eating.  Her CT shows a thickened stomach.  She also has a small lower abdominal hernia 18 mm in the midline that is likely at the lower pole of the mesh. I would like to evaluate her stomach with an EGD.  I would not recommend surgery for treatment for the small hernia at this time.    EGD.    Josue Love MD  [facility]  [phone]       Again, thank you for allowing me to participate in the care of your patient.        Sincerely,        Josue Love MD

## 2024-06-06 ENCOUNTER — TRANSFERRED RECORDS (OUTPATIENT)
Dept: HEALTH INFORMATION MANAGEMENT | Facility: CLINIC | Age: 74
End: 2024-06-06
Payer: COMMERCIAL

## 2024-06-06 NOTE — PROVIDER NOTIFICATION
06/06/24 1502   Discharge Planning   Patient/Family Anticipates Transition to home with family   Concerns to be Addressed all concerns addressed in this encounter   Living Arrangements   People in Home spouse   Type of Residence Private Residence   Is your private residence a single family home or apartment? Single family home   Number of Stairs, Within Home, Primary none   Stair Railings, Within Home, Primary railings safe and in good condition   Once home, are you able to live on one level? Yes   Which level? Main Level   Bathroom Shower/Tub Walk-in shower   Equipment Currently Used at Home raised toilet seat;cane, straight   Support System   Support Systems Spouse/Significant Other   Do you have someone available to stay with you one or two nights once you are home? Yes   Medical Clearance   Date of Physical 06/06/24   [facility]   It is recommended that you call and check with any specialty providers before surgery to see if you need surgical clearance.  Do you see any specialty providers outside of your primary care provider? No   Blood   Known Bleeding Disorder or Coagulopathy No   Does the patient have any Jehovah's witness/cultural preferences related to blood products? No   Education   Has the patient scheduled or completed pre-op total joint education, either in class or online, in the last 12 months? No   Relationship/Living Environment   Name(s) of People in Home malena

## 2024-06-07 ENCOUNTER — ANESTHESIA EVENT (OUTPATIENT)
Dept: SURGERY | Facility: CLINIC | Age: 74
End: 2024-06-07
Payer: COMMERCIAL

## 2024-06-07 ASSESSMENT — LIFESTYLE VARIABLES: TOBACCO_USE: 1

## 2024-06-07 NOTE — ANESTHESIA PREPROCEDURE EVALUATION
Anesthesia Pre-Procedure Evaluation    Patient: Mary Martino   MRN: [id number] : 1950        Procedure : Procedure(s):  Reverse total shoulder arthroplasty          Past Medical History:   Diagnosis Date    Depression     GERD (gastroesophageal reflux disease)     Hyperlipidemia     Hypertension     Hypothyroidism     h/o grave's disease    Leiomyoma of uterus     PONV (postoperative nausea and vomiting)     Seasonal allergies     Ventral hernia       Past Surgical History:   Procedure Laterality Date    APPENDECTOMY      CHOLECYSTECTOMY      COLON SURGERY  2009    partial colon resection    EYE SURGERY      for proptosis    HEMIARTHROPLASTY SHOULDER FRACTURE Right     HERNIA REPAIR      HERNIORRHAPHY VENTRAL N/A 3/13/2018    Procedure: VENTRAL HERNIA REPAIR;  Surgeon: Josue Love MD;  Location: [facility];  Service:     HUMERUS FRACTURE SURGERY      HYSTERECTOMY Bilateral 3/13/2018    Procedure: EXPLORATORY LAPAROTOMY TOTAL ABDOMINAL HYSTERECTOMY, BILATERAL SALPINGO OOPHORECTOMY PELVIC WASHINGS LYSIS OF ADHESIONS;  Surgeon: Sheri Zamarripa MD;  Location: [facility];  Service:     THYROID SURGERY      ablation    TUBAL LIGATION        Allergies   Allergen Reactions    Amlodipine Dizziness    Codeine Nausea and Vomiting    Penicillins Hives     Occurred during childhood, Other reaction(s): *Unknown - Childhood Rxn, tolerates cephalosporins w/o problems      Social History     Tobacco Use    Smoking status: Former     Current packs/day: 0.00     Types: Cigarettes     Quit date: 2000     Years since quittin.4    Smokeless tobacco: Never   Substance Use Topics    Alcohol use: Yes     Comment: Alcoholic Drinks/day: 1 drink a month       Wt Readings from Last 1 Encounters:   24 66.2 kg (146 lb)        Anesthesia Evaluation   Pt has had prior anesthetic. Type: General and MAC.    History of anesthetic complications  - PONV.      ROS/MED HX  ENT/Pulmonary:     (+)                " tobacco use, Past use,                       Neurologic:       Cardiovascular:     (+) Dyslipidemia hypertension- -   -  - -                                 Previous cardiac testing   Echo: Date: Results:    Stress Test:  Date: Results:    ECG Reviewed:  Date: 2/26/24 Results:  Sinus Rhythm   WITHIN NORMAL LIMITS  Cath:  Date: Results:      METS/Exercise Tolerance: >4 METS    Hematologic:       Musculoskeletal:   (+)     fracture, Fracture location: LUE (Proximal humerus fracture),         GI/Hepatic: Comment: H/o cholecystectomy & appendectomy    (+) GERD,                   Renal/Genitourinary:       Endo:     (+)          thyroid problem, hypothyroidism,           Psychiatric/Substance Use:     (+) psychiatric history depression       Infectious Disease:       Malignancy:       Other:            Physical Exam    Airway  airway exam normal      Mallampati: II   TM distance: > 3 FB   Neck ROM: full   Mouth opening: > 3 cm    Respiratory Devices and Support         Dental       (+) Minor Abnormalities - some fillings, tiny chips      Cardiovascular   cardiovascular exam normal          Pulmonary   pulmonary exam normal                OUTSIDE LABS:  CBC:   Lab Results   Component Value Date    WBC 10.9 05/24/2024    WBC 11.9 (H) 05/23/2024    HGB 11.4 (L) 05/24/2024    HGB 12.0 05/23/2024    HCT 33.6 (L) 05/24/2024    HCT 35.1 05/23/2024     05/24/2024     05/23/2024     BMP:   Lab Results   Component Value Date     05/24/2024     05/23/2024    POTASSIUM 3.7 05/24/2024    POTASSIUM 4.1 05/23/2024    CHLORIDE 106 05/24/2024    CHLORIDE 105 05/23/2024    CO2 18 (L) 05/24/2024    CO2 21 (L) 05/23/2024    BUN 20.4 05/24/2024    BUN 23.4 (H) 05/23/2024    CR 1.15 (H) 05/24/2024    CR 1.12 (H) 05/23/2024     (H) 05/24/2024     (H) 05/23/2024     COAGS: No results found for: \"PTT\", \"INR\", \"FIBR\"  POC: No results found for: \"BGM\", \"HCG\", \"HCGS\"  HEPATIC:   Lab Results   Component Value " "Date    ALBUMIN 4.1 02/26/2024    PROTTOTAL 6.7 02/26/2024    ALT 17 02/26/2024    AST 23 02/26/2024    ALKPHOS 77 02/26/2024    BILITOTAL 0.4 02/26/2024     OTHER:   Lab Results   Component Value Date    LACT 1.3 05/23/2024    VADIM 8.9 05/24/2024    LIPASE 59 12/11/2023    AMYLASE 63 12/11/2023    TSH 2.52 02/26/2024       Anesthesia Plan    ASA Status:  3    NPO Status:  NPO Appropriate    Anesthesia Type: General.     - Airway: ETT   Induction: Intravenous.   Maintenance: TIVA.        Consents    Anesthesia Plan(s) and associated risks, benefits, and realistic alternatives discussed. Questions answered and patient/representative(s) expressed understanding.     - Discussed: Risks, Benefits and Alternatives for BOTH SEDATION and the PROCEDURE were discussed     - Discussed with:  Patient            Postoperative Care    Pain management: IV analgesics, Oral pain medications, Multi-modal analgesia, Peripheral nerve block (Single Shot).   PONV prophylaxis: Ondansetron (or other 5HT-3), Dexamethasone or Solumedrol, Background Propofol Infusion     Comments:               BUTCH Beck CRNA    I have reviewed the pertinent notes and labs in the chart from the past 30 days and (re)examined the patient.  Any updates or changes from those notes are reflected in this note.              # Overweight: Estimated body mass index is 27.59 kg/m  as calculated from the following:    Height as of 6/5/24: 1.549 m (5' 1\").    Weight as of 6/5/24: 66.2 kg (146 lb).      "

## 2024-06-10 ENCOUNTER — ANESTHESIA (OUTPATIENT)
Dept: SURGERY | Facility: CLINIC | Age: 74
End: 2024-06-10
Payer: COMMERCIAL

## 2024-06-10 ENCOUNTER — HOSPITAL ENCOUNTER (OUTPATIENT)
Facility: CLINIC | Age: 74
Discharge: HOME | End: 2024-06-12
Attending: ORTHOPAEDIC SURGERY | Admitting: ORTHOPAEDIC SURGERY
Payer: COMMERCIAL

## 2024-06-10 ENCOUNTER — APPOINTMENT (OUTPATIENT)
Dept: GENERAL RADIOLOGY | Facility: CLINIC | Age: 74
End: 2024-06-10
Attending: ORTHOPAEDIC SURGERY
Payer: COMMERCIAL

## 2024-06-10 ENCOUNTER — ANCILLARY PROCEDURE (OUTPATIENT)
Dept: ULTRASOUND IMAGING | Facility: CLINIC | Age: 74
End: 2024-06-10
Payer: COMMERCIAL

## 2024-06-10 DIAGNOSIS — Z96.612 STATUS POST REVERSE ARTHROPLASTY OF LEFT SHOULDER: Primary | ICD-10-CM

## 2024-06-10 PROBLEM — Z96.619 S/P REVERSE TOTAL SHOULDER ARTHROPLASTY, UNSPECIFIED LATERALITY: Status: ACTIVE | Noted: 2024-06-10

## 2024-06-10 LAB — GLUCOSE BLDC GLUCOMTR-MCNC: 88 MG/DL (ref 70–99)

## 2024-06-10 PROCEDURE — 258N000003 HC RX IP 258 OP 636: Mod: JZ | Performed by: NURSE ANESTHETIST, CERTIFIED REGISTERED

## 2024-06-10 PROCEDURE — 999N000141 HC STATISTIC PRE-PROCEDURE NURSING ASSESSMENT: Performed by: ORTHOPAEDIC SURGERY

## 2024-06-10 PROCEDURE — 250N000011 HC RX IP 250 OP 636: Mod: JZ | Performed by: NURSE ANESTHETIST, CERTIFIED REGISTERED

## 2024-06-10 PROCEDURE — 250N000009 HC RX 250: Performed by: ORTHOPAEDIC SURGERY

## 2024-06-10 PROCEDURE — 272N000001 HC OR GENERAL SUPPLY STERILE: Performed by: ORTHOPAEDIC SURGERY

## 2024-06-10 PROCEDURE — 250N000011 HC RX IP 250 OP 636: Performed by: ORTHOPAEDIC SURGERY

## 2024-06-10 PROCEDURE — 271N000001 HC OR GENERAL SUPPLY NON-STERILE: Performed by: ORTHOPAEDIC SURGERY

## 2024-06-10 PROCEDURE — 250N000013 HC RX MED GY IP 250 OP 250 PS 637: Performed by: NURSE ANESTHETIST, CERTIFIED REGISTERED

## 2024-06-10 PROCEDURE — 258N000003 HC RX IP 258 OP 636: Mod: JZ

## 2024-06-10 PROCEDURE — 250N000013 HC RX MED GY IP 250 OP 250 PS 637: Performed by: ORTHOPAEDIC SURGERY

## 2024-06-10 PROCEDURE — 258N000003 HC RX IP 258 OP 636: Mod: JZ | Performed by: ORTHOPAEDIC SURGERY

## 2024-06-10 PROCEDURE — 82962 GLUCOSE BLOOD TEST: CPT

## 2024-06-10 PROCEDURE — C1713 ANCHOR/SCREW BN/BN,TIS/BN: HCPCS | Performed by: ORTHOPAEDIC SURGERY

## 2024-06-10 PROCEDURE — 250N000009 HC RX 250

## 2024-06-10 PROCEDURE — 258N000001 HC RX 258: Performed by: ORTHOPAEDIC SURGERY

## 2024-06-10 PROCEDURE — 250N000013 HC RX MED GY IP 250 OP 250 PS 637

## 2024-06-10 PROCEDURE — 250N000011 HC RX IP 250 OP 636: Mod: JZ

## 2024-06-10 PROCEDURE — 710N000009 HC RECOVERY PHASE 1, LEVEL 1, PER MIN: Performed by: ORTHOPAEDIC SURGERY

## 2024-06-10 PROCEDURE — 73030 X-RAY EXAM OF SHOULDER: CPT | Mod: LT

## 2024-06-10 PROCEDURE — 370N000017 HC ANESTHESIA TECHNICAL FEE, PER MIN: Performed by: ORTHOPAEDIC SURGERY

## 2024-06-10 PROCEDURE — 250N000009 HC RX 250: Performed by: NURSE ANESTHETIST, CERTIFIED REGISTERED

## 2024-06-10 PROCEDURE — 250N000011 HC RX IP 250 OP 636

## 2024-06-10 PROCEDURE — 250N000011 HC RX IP 250 OP 636: Performed by: NURSE ANESTHETIST, CERTIFIED REGISTERED

## 2024-06-10 PROCEDURE — 360N000077 HC SURGERY LEVEL 4, PER MIN: Performed by: ORTHOPAEDIC SURGERY

## 2024-06-10 PROCEDURE — C1776 JOINT DEVICE (IMPLANTABLE): HCPCS | Performed by: ORTHOPAEDIC SURGERY

## 2024-06-10 DEVICE — IMPLANTABLE DEVICE
Type: IMPLANTABLE DEVICE | Site: SHOULDER | Status: FUNCTIONAL
Brand: AEQUALIS™ FLEX REVIVE™

## 2024-06-10 DEVICE — SCREW PERIPHERAL 26MM: Type: IMPLANTABLE DEVICE | Site: SHOULDER | Status: FUNCTIONAL

## 2024-06-10 DEVICE — IMPLANTABLE DEVICE
Type: IMPLANTABLE DEVICE | Site: SHOULDER | Status: FUNCTIONAL
Brand: TORNIER FLEX SHOULDER SYSTEM

## 2024-06-10 DEVICE — PTC PROXIMAL BODY
Type: IMPLANTABLE DEVICE | Site: SHOULDER | Status: FUNCTIONAL
Brand: TORNIER HRS

## 2024-06-10 DEVICE — SIMPLEX® HV IS A FAST-SETTING ACRYLIC RESIN FOR USE IN BONE SURGERY. MIXING THE TWO SEPARATE STERILE COMPONENTS PRODUCES A DUCTILE BONE CEMENT WHICH, AFTER HARDENING, FIXES THE IMPLANT AND TRANSFERS STRESSES PRODUCED DURING MOVEMENT EVENLY TO THE BONE. SIMPLEX® HV CEMENT POWDER ALSO CONTAINS INSOLUBLE ZIRCONIUM DIOXIDE AS AN X-RAY CONTRAST MEDIUM. SIMPLEX® HV DOES NOT EMIT A SIGNAL AND DOES NOT POSE A SAFETY RISK IN A MAGNETIC RESONANCE ENVIRONMENT.
Type: IMPLANTABLE DEVICE | Site: SHOULDER | Status: FUNCTIONAL
Brand: SIMPLEX HV

## 2024-06-10 DEVICE — IMPLANTABLE DEVICE
Type: IMPLANTABLE DEVICE | Site: SHOULDER | Status: FUNCTIONAL
Brand: TORNIER PERFORM® REVERSED AUGMENTED GLENOID

## 2024-06-10 DEVICE — IMPLANTABLE DEVICE: Type: IMPLANTABLE DEVICE | Site: SHOULDER | Status: FUNCTIONAL

## 2024-06-10 DEVICE — LOCKING CAP
Type: IMPLANTABLE DEVICE | Site: SHOULDER | Status: FUNCTIONAL
Brand: TORNIER HRS

## 2024-06-10 DEVICE — ASSEMBLY SCREW
Type: IMPLANTABLE DEVICE | Site: SHOULDER | Status: FUNCTIONAL
Brand: TORNIER HRS

## 2024-06-10 DEVICE — SCREW PERIPHERAL 5.0X34MM DWJ334: Type: IMPLANTABLE DEVICE | Site: SHOULDER | Status: FUNCTIONAL

## 2024-06-10 DEVICE — SCREW PERIPHERAL 14MM DWJ314: Type: IMPLANTABLE DEVICE | Site: SHOULDER | Status: FUNCTIONAL

## 2024-06-10 RX ORDER — FENTANYL CITRATE 50 UG/ML
25 INJECTION, SOLUTION INTRAMUSCULAR; INTRAVENOUS EVERY 5 MIN PRN
Status: DISCONTINUED | OUTPATIENT
Start: 2024-06-10 | End: 2024-06-10 | Stop reason: HOSPADM

## 2024-06-10 RX ORDER — HYDROMORPHONE HCL IN WATER/PF 6 MG/30 ML
0.1 PATIENT CONTROLLED ANALGESIA SYRINGE INTRAVENOUS
Status: DISCONTINUED | OUTPATIENT
Start: 2024-06-10 | End: 2024-06-12 | Stop reason: HOSPADM

## 2024-06-10 RX ORDER — TRANEXAMIC ACID 650 MG/1
1950 TABLET ORAL ONCE
Status: COMPLETED | OUTPATIENT
Start: 2024-06-10 | End: 2024-06-10

## 2024-06-10 RX ORDER — NALOXONE HYDROCHLORIDE 0.4 MG/ML
0.1 INJECTION, SOLUTION INTRAMUSCULAR; INTRAVENOUS; SUBCUTANEOUS
Status: DISCONTINUED | OUTPATIENT
Start: 2024-06-10 | End: 2024-06-10 | Stop reason: HOSPADM

## 2024-06-10 RX ORDER — LIDOCAINE 40 MG/G
CREAM TOPICAL
Status: DISCONTINUED | OUTPATIENT
Start: 2024-06-10 | End: 2024-06-10 | Stop reason: HOSPADM

## 2024-06-10 RX ORDER — ASPIRIN 81 MG/1
81 TABLET ORAL 2 TIMES DAILY
Status: DISCONTINUED | OUTPATIENT
Start: 2024-06-10 | End: 2024-06-12 | Stop reason: HOSPADM

## 2024-06-10 RX ORDER — ACETAMINOPHEN 325 MG/1
650 TABLET ORAL EVERY 4 HOURS PRN
Status: DISCONTINUED | OUTPATIENT
Start: 2024-06-13 | End: 2024-06-12 | Stop reason: HOSPADM

## 2024-06-10 RX ORDER — NALOXONE HYDROCHLORIDE 0.4 MG/ML
0.4 INJECTION, SOLUTION INTRAMUSCULAR; INTRAVENOUS; SUBCUTANEOUS
Status: DISCONTINUED | OUTPATIENT
Start: 2024-06-10 | End: 2024-06-12 | Stop reason: HOSPADM

## 2024-06-10 RX ORDER — DEXAMETHASONE SODIUM PHOSPHATE 4 MG/ML
4 INJECTION, SOLUTION INTRA-ARTICULAR; INTRALESIONAL; INTRAMUSCULAR; INTRAVENOUS; SOFT TISSUE
Status: DISCONTINUED | OUTPATIENT
Start: 2024-06-10 | End: 2024-06-10 | Stop reason: HOSPADM

## 2024-06-10 RX ORDER — NALOXONE HYDROCHLORIDE 0.4 MG/ML
0.2 INJECTION, SOLUTION INTRAMUSCULAR; INTRAVENOUS; SUBCUTANEOUS
Status: DISCONTINUED | OUTPATIENT
Start: 2024-06-10 | End: 2024-06-12 | Stop reason: HOSPADM

## 2024-06-10 RX ORDER — OXYCODONE HYDROCHLORIDE 5 MG/1
5 TABLET ORAL EVERY 4 HOURS PRN
Status: DISCONTINUED | OUTPATIENT
Start: 2024-06-10 | End: 2024-06-12 | Stop reason: HOSPADM

## 2024-06-10 RX ORDER — SODIUM CHLORIDE, SODIUM LACTATE, POTASSIUM CHLORIDE, CALCIUM CHLORIDE 600; 310; 30; 20 MG/100ML; MG/100ML; MG/100ML; MG/100ML
INJECTION, SOLUTION INTRAVENOUS CONTINUOUS
Status: DISCONTINUED | OUTPATIENT
Start: 2024-06-10 | End: 2024-06-10 | Stop reason: HOSPADM

## 2024-06-10 RX ORDER — POLYETHYLENE GLYCOL 3350 17 G/17G
17 POWDER, FOR SOLUTION ORAL DAILY
Status: DISCONTINUED | OUTPATIENT
Start: 2024-06-11 | End: 2024-06-12 | Stop reason: HOSPADM

## 2024-06-10 RX ORDER — DEXAMETHASONE SODIUM PHOSPHATE 10 MG/ML
INJECTION, SOLUTION INTRAMUSCULAR; INTRAVENOUS
Status: COMPLETED | OUTPATIENT
Start: 2024-06-10 | End: 2024-06-10

## 2024-06-10 RX ORDER — ONDANSETRON 2 MG/ML
INJECTION INTRAMUSCULAR; INTRAVENOUS PRN
Status: DISCONTINUED | OUTPATIENT
Start: 2024-06-10 | End: 2024-06-10

## 2024-06-10 RX ORDER — CEFAZOLIN SODIUM 1 G/3ML
1 INJECTION, POWDER, FOR SOLUTION INTRAMUSCULAR; INTRAVENOUS EVERY 8 HOURS
Qty: 10 ML | Refills: 0 | Status: COMPLETED | OUTPATIENT
Start: 2024-06-10 | End: 2024-06-11

## 2024-06-10 RX ORDER — PROPOFOL 10 MG/ML
INJECTION, EMULSION INTRAVENOUS PRN
Status: DISCONTINUED | OUTPATIENT
Start: 2024-06-10 | End: 2024-06-10

## 2024-06-10 RX ORDER — ACETAMINOPHEN 325 MG/1
650 TABLET ORAL EVERY 4 HOURS PRN
Status: SHIPPED
Start: 2024-06-10 | End: 2024-06-26

## 2024-06-10 RX ORDER — HYDROMORPHONE HCL IN WATER/PF 6 MG/30 ML
0.4 PATIENT CONTROLLED ANALGESIA SYRINGE INTRAVENOUS EVERY 5 MIN PRN
Status: DISCONTINUED | OUTPATIENT
Start: 2024-06-10 | End: 2024-06-10 | Stop reason: HOSPADM

## 2024-06-10 RX ORDER — CEFAZOLIN SODIUM/WATER 2 G/20 ML
2 SYRINGE (ML) INTRAVENOUS
Status: COMPLETED | OUTPATIENT
Start: 2024-06-10 | End: 2024-06-10

## 2024-06-10 RX ORDER — HYDROXYZINE HYDROCHLORIDE 10 MG/1
10 TABLET, FILM COATED ORAL EVERY 6 HOURS PRN
Qty: 30 TABLET | Refills: 0 | Status: SHIPPED | OUTPATIENT
Start: 2024-06-10

## 2024-06-10 RX ORDER — HYDROXYZINE HYDROCHLORIDE 10 MG/1
10 TABLET, FILM COATED ORAL EVERY 6 HOURS PRN
Status: DISCONTINUED | OUTPATIENT
Start: 2024-06-10 | End: 2024-06-12 | Stop reason: HOSPADM

## 2024-06-10 RX ORDER — ACETAMINOPHEN 325 MG/1
975 TABLET ORAL EVERY 8 HOURS
Qty: 27 TABLET | Refills: 0 | Status: DISCONTINUED | OUTPATIENT
Start: 2024-06-10 | End: 2024-06-12 | Stop reason: HOSPADM

## 2024-06-10 RX ORDER — AMOXICILLIN 250 MG
1 CAPSULE ORAL 2 TIMES DAILY
Status: DISCONTINUED | OUTPATIENT
Start: 2024-06-10 | End: 2024-06-12 | Stop reason: HOSPADM

## 2024-06-10 RX ORDER — OXYCODONE HYDROCHLORIDE 5 MG/1
5-10 TABLET ORAL EVERY 4 HOURS PRN
Qty: 30 TABLET | Refills: 0 | Status: SHIPPED | OUTPATIENT
Start: 2024-06-10 | End: 2024-06-26

## 2024-06-10 RX ORDER — LIDOCAINE HYDROCHLORIDE 20 MG/ML
INJECTION, SOLUTION INFILTRATION; PERINEURAL PRN
Status: DISCONTINUED | OUTPATIENT
Start: 2024-06-10 | End: 2024-06-10

## 2024-06-10 RX ORDER — ONDANSETRON 4 MG/1
4 TABLET, ORALLY DISINTEGRATING ORAL EVERY 30 MIN PRN
Status: DISCONTINUED | OUTPATIENT
Start: 2024-06-10 | End: 2024-06-10 | Stop reason: HOSPADM

## 2024-06-10 RX ORDER — ONDANSETRON 2 MG/ML
4 INJECTION INTRAMUSCULAR; INTRAVENOUS EVERY 6 HOURS PRN
Status: DISCONTINUED | OUTPATIENT
Start: 2024-06-10 | End: 2024-06-12 | Stop reason: HOSPADM

## 2024-06-10 RX ORDER — ONDANSETRON 4 MG/1
4 TABLET, ORALLY DISINTEGRATING ORAL EVERY 6 HOURS PRN
Status: DISCONTINUED | OUTPATIENT
Start: 2024-06-10 | End: 2024-06-12 | Stop reason: HOSPADM

## 2024-06-10 RX ORDER — ACETAMINOPHEN 325 MG/1
975 TABLET ORAL ONCE
Status: COMPLETED | OUTPATIENT
Start: 2024-06-10 | End: 2024-06-10

## 2024-06-10 RX ORDER — HYDROMORPHONE HCL IN WATER/PF 6 MG/30 ML
0.2 PATIENT CONTROLLED ANALGESIA SYRINGE INTRAVENOUS EVERY 5 MIN PRN
Status: DISCONTINUED | OUTPATIENT
Start: 2024-06-10 | End: 2024-06-10 | Stop reason: HOSPADM

## 2024-06-10 RX ORDER — PROPOFOL 10 MG/ML
INJECTION, EMULSION INTRAVENOUS CONTINUOUS PRN
Status: DISCONTINUED | OUTPATIENT
Start: 2024-06-10 | End: 2024-06-10

## 2024-06-10 RX ORDER — ROPIVACAINE HYDROCHLORIDE 5 MG/ML
INJECTION, SOLUTION EPIDURAL; INFILTRATION; PERINEURAL
Status: COMPLETED | OUTPATIENT
Start: 2024-06-10 | End: 2024-06-10

## 2024-06-10 RX ORDER — ASPIRIN 81 MG/1
81 TABLET ORAL 2 TIMES DAILY
Qty: 60 TABLET | Refills: 0 | Status: SHIPPED | OUTPATIENT
Start: 2024-06-10

## 2024-06-10 RX ORDER — ONDANSETRON 2 MG/ML
4 INJECTION INTRAMUSCULAR; INTRAVENOUS EVERY 30 MIN PRN
Status: DISCONTINUED | OUTPATIENT
Start: 2024-06-10 | End: 2024-06-10 | Stop reason: HOSPADM

## 2024-06-10 RX ORDER — BISACODYL 10 MG
10 SUPPOSITORY, RECTAL RECTAL DAILY PRN
Status: DISCONTINUED | OUTPATIENT
Start: 2024-06-10 | End: 2024-06-12 | Stop reason: HOSPADM

## 2024-06-10 RX ORDER — CEFAZOLIN SODIUM/WATER 2 G/20 ML
2 SYRINGE (ML) INTRAVENOUS SEE ADMIN INSTRUCTIONS
Status: DISCONTINUED | OUTPATIENT
Start: 2024-06-10 | End: 2024-06-10 | Stop reason: HOSPADM

## 2024-06-10 RX ORDER — FENTANYL CITRATE 50 UG/ML
INJECTION, SOLUTION INTRAMUSCULAR; INTRAVENOUS PRN
Status: DISCONTINUED | OUTPATIENT
Start: 2024-06-10 | End: 2024-06-10

## 2024-06-10 RX ORDER — HYDROXYZINE HYDROCHLORIDE 10 MG/1
10 TABLET, FILM COATED ORAL EVERY 6 HOURS PRN
Status: DISCONTINUED | OUTPATIENT
Start: 2024-06-10 | End: 2024-06-10 | Stop reason: HOSPADM

## 2024-06-10 RX ORDER — SODIUM CHLORIDE, SODIUM LACTATE, POTASSIUM CHLORIDE, CALCIUM CHLORIDE 600; 310; 30; 20 MG/100ML; MG/100ML; MG/100ML; MG/100ML
INJECTION, SOLUTION INTRAVENOUS CONTINUOUS
Status: DISCONTINUED | OUTPATIENT
Start: 2024-06-10 | End: 2024-06-12 | Stop reason: HOSPADM

## 2024-06-10 RX ORDER — HYDROMORPHONE HCL IN WATER/PF 6 MG/30 ML
0.2 PATIENT CONTROLLED ANALGESIA SYRINGE INTRAVENOUS
Status: DISCONTINUED | OUTPATIENT
Start: 2024-06-10 | End: 2024-06-12 | Stop reason: HOSPADM

## 2024-06-10 RX ORDER — AMOXICILLIN 250 MG
1-2 CAPSULE ORAL 2 TIMES DAILY PRN
Qty: 15 TABLET | Refills: 0 | Status: SHIPPED | OUTPATIENT
Start: 2024-06-10 | End: 2024-06-26

## 2024-06-10 RX ORDER — FENTANYL CITRATE 50 UG/ML
50 INJECTION, SOLUTION INTRAMUSCULAR; INTRAVENOUS EVERY 5 MIN PRN
Status: DISCONTINUED | OUTPATIENT
Start: 2024-06-10 | End: 2024-06-10 | Stop reason: HOSPADM

## 2024-06-10 RX ORDER — LIDOCAINE 40 MG/G
CREAM TOPICAL
Status: DISCONTINUED | OUTPATIENT
Start: 2024-06-10 | End: 2024-06-12 | Stop reason: HOSPADM

## 2024-06-10 RX ORDER — PROCHLORPERAZINE MALEATE 5 MG
5 TABLET ORAL EVERY 6 HOURS PRN
Status: DISCONTINUED | OUTPATIENT
Start: 2024-06-10 | End: 2024-06-12 | Stop reason: HOSPADM

## 2024-06-10 RX ORDER — DEXAMETHASONE SODIUM PHOSPHATE 4 MG/ML
INJECTION, SOLUTION INTRA-ARTICULAR; INTRALESIONAL; INTRAMUSCULAR; INTRAVENOUS; SOFT TISSUE PRN
Status: DISCONTINUED | OUTPATIENT
Start: 2024-06-10 | End: 2024-06-10

## 2024-06-10 RX ADMIN — TRANEXAMIC ACID 1950 MG: 650 TABLET ORAL at 08:37

## 2024-06-10 RX ADMIN — LIDOCAINE HYDROCHLORIDE 0.1 ML: 10 INJECTION, SOLUTION EPIDURAL; INFILTRATION; INTRACAUDAL; PERINEURAL at 08:48

## 2024-06-10 RX ADMIN — FENTANYL CITRATE 50 MCG: 50 INJECTION INTRAMUSCULAR; INTRAVENOUS at 09:53

## 2024-06-10 RX ADMIN — ROCURONIUM BROMIDE 30 MG: 50 INJECTION, SOLUTION INTRAVENOUS at 11:10

## 2024-06-10 RX ADMIN — PROPOFOL 150 MG: 10 INJECTION, EMULSION INTRAVENOUS at 10:24

## 2024-06-10 RX ADMIN — Medication 2 G: at 10:19

## 2024-06-10 RX ADMIN — FENTANYL CITRATE 50 MCG: 50 INJECTION INTRAMUSCULAR; INTRAVENOUS at 09:43

## 2024-06-10 RX ADMIN — MIDAZOLAM 2 MG: 1 INJECTION INTRAMUSCULAR; INTRAVENOUS at 09:43

## 2024-06-10 RX ADMIN — SODIUM CHLORIDE, POTASSIUM CHLORIDE, SODIUM LACTATE AND CALCIUM CHLORIDE: 600; 310; 30; 20 INJECTION, SOLUTION INTRAVENOUS at 11:52

## 2024-06-10 RX ADMIN — LIDOCAINE HYDROCHLORIDE 100 MG: 20 INJECTION, SOLUTION INFILTRATION; PERINEURAL at 10:24

## 2024-06-10 RX ADMIN — FENTANYL CITRATE 25 MCG: 50 INJECTION INTRAMUSCULAR; INTRAVENOUS at 13:00

## 2024-06-10 RX ADMIN — SODIUM CHLORIDE, POTASSIUM CHLORIDE, SODIUM LACTATE AND CALCIUM CHLORIDE: 600; 310; 30; 20 INJECTION, SOLUTION INTRAVENOUS at 14:25

## 2024-06-10 RX ADMIN — DEXAMETHASONE SODIUM PHOSPHATE 4 MG: 4 INJECTION, SOLUTION INTRA-ARTICULAR; INTRALESIONAL; INTRAMUSCULAR; INTRAVENOUS; SOFT TISSUE at 10:24

## 2024-06-10 RX ADMIN — PROPOFOL 200 MCG/KG/MIN: 10 INJECTION, EMULSION INTRAVENOUS at 10:24

## 2024-06-10 RX ADMIN — ROPIVACAINE HYDROCHLORIDE 30 ML: 5 INJECTION, SOLUTION EPIDURAL; INFILTRATION; PERINEURAL at 09:58

## 2024-06-10 RX ADMIN — CEFAZOLIN 1 G: 1 INJECTION, POWDER, FOR SOLUTION INTRAMUSCULAR; INTRAVENOUS at 18:41

## 2024-06-10 RX ADMIN — ONDANSETRON 4 MG: 2 INJECTION INTRAMUSCULAR; INTRAVENOUS at 11:59

## 2024-06-10 RX ADMIN — ACETAMINOPHEN 975 MG: 325 TABLET, FILM COATED ORAL at 08:37

## 2024-06-10 RX ADMIN — FENTANYL CITRATE 25 MCG: 50 INJECTION INTRAMUSCULAR; INTRAVENOUS at 12:54

## 2024-06-10 RX ADMIN — ONDANSETRON 4 MG: 2 INJECTION INTRAMUSCULAR; INTRAVENOUS at 12:56

## 2024-06-10 RX ADMIN — SODIUM CHLORIDE, POTASSIUM CHLORIDE, SODIUM LACTATE AND CALCIUM CHLORIDE: 600; 310; 30; 20 INJECTION, SOLUTION INTRAVENOUS at 08:48

## 2024-06-10 RX ADMIN — DEXAMETHASONE SODIUM PHOSPHATE 4 MG: 10 INJECTION, SOLUTION INTRAMUSCULAR; INTRAVENOUS at 09:58

## 2024-06-10 RX ADMIN — ROCURONIUM BROMIDE 50 MG: 50 INJECTION, SOLUTION INTRAVENOUS at 10:24

## 2024-06-10 RX ADMIN — SUGAMMADEX 200 MG: 100 INJECTION, SOLUTION INTRAVENOUS at 12:06

## 2024-06-10 RX ADMIN — ACETAMINOPHEN 975 MG: 325 TABLET, FILM COATED ORAL at 16:18

## 2024-06-10 RX ADMIN — PHENYLEPHRINE HYDROCHLORIDE 0.5 MCG/KG/MIN: 10 INJECTION INTRAVENOUS at 10:24

## 2024-06-10 RX ADMIN — ROCURONIUM BROMIDE 20 MG: 50 INJECTION, SOLUTION INTRAVENOUS at 11:57

## 2024-06-10 ASSESSMENT — ACTIVITIES OF DAILY LIVING (ADL)
ADLS_ACUITY_SCORE: 24
ADLS_ACUITY_SCORE: 22
ADLS_ACUITY_SCORE: 22
ADLS_ACUITY_SCORE: 24
ADLS_ACUITY_SCORE: 19
ADLS_ACUITY_SCORE: 24
ADLS_ACUITY_SCORE: 22
ADLS_ACUITY_SCORE: 19
ADLS_ACUITY_SCORE: 24
ADLS_ACUITY_SCORE: 19

## 2024-06-10 NOTE — PROGRESS NOTES
"[place] [facility] [place]MISSION NOTE    Patient admitted to room 2309 at approximately 1400 via cart from surgery. Patient was accompanied by transport tech.     Verbal SBAR report received from Melanie FISHER prior to patient arrival.     Patient trasferred to bed via air ronni. Patient alert and oriented X 3. The patient is not having any pain.  . Admission vital signs: Blood pressure (!) 152/85, pulse 69, temperature 97.7  F (36.5  C), temperature source Oral, resp. rate 18, height 1.549 m (5' 1\"), weight 66.2 kg (146 lb), SpO2 96%. Patient was oriented to plan of care, call light, bed controls, tv, telephone, bathroom, and visiting hours.     Risk Assessment    The following safety risks were identified during admission: fall. Yellow risk band applied: YES.     Skin Initial Assessment    This writer admitted this patient and completed a full skin assessment and Antelmo score in the Adult PCS flowsheet.   Photo documentation of skin problem and/or wound competed via [employer] application (located under Media):  Yes    Appropriate interventions initiated as needed.     Secondary skin check completed by Laxmi.         Education    Patient has a [place] to Observation order: No  Observation education completed and documented: N/A      Dixie Champion RN      "

## 2024-06-10 NOTE — ANESTHESIA CARE TRANSFER NOTE
Patient: Mary Martino    Procedure: Procedure(s):  Reverse total shoulder arthroplasty       Diagnosis: Proximal humerus fracture [S42.209A]  Diagnosis Additional Information: No value filed.    Anesthesia Type:   General     Note:    Oropharynx: oropharynx clear of all foreign objects and spontaneously breathing  Level of Consciousness: drowsy  Oxygen Supplementation: face mask  Level of Supplemental Oxygen (L/min / FiO2): 8  Independent Airway: airway patency satisfactory and stable  Dentition: dentition unchanged  Vital Signs Stable: post-procedure vital signs reviewed and stable  Report to RN Given: handoff report given  Patient transferred to: PACU    Handoff Report: Identifed the Patient, Identified the Reponsible Provider, Reviewed the pertinent medical history, Discussed the surgical course, Reviewed Intra-OP anesthesia mangement and issues during anesthesia, Set expectations for post-procedure period and Allowed opportunity for questions and acknowledgement of understanding      Vitals:  Vitals Value Taken Time   /72 06/10/24 1225   Temp 36  C (96.8  F) 06/10/24 1227   Pulse 77 06/10/24 1225   Resp 19 06/10/24 1227   SpO2 99 % 06/10/24 1227   Vitals shown include unfiled device data.    Electronically Signed By: BUTCH Prieto CRNA  Christy 10, 2024  12:28 PM

## 2024-06-10 NOTE — PROVIDER NOTIFICATION
06/10/24 0849   Discharge Planning   People in Home spouse   Support Systems Spouse/Significant Other   Type of Residence Private Residence   Patient/Family Anticipates Transition to home with family   Relationship/Living Environment   Name(s) of People in Home malena

## 2024-06-10 NOTE — PROVIDER NOTIFICATION
06/10/24 0849   Discharge Planning   Patient/Family Anticipates Transition to home with family   Concerns to be Addressed all concerns addressed in this encounter;no discharge needs identified   Living Arrangements   People in Home spouse   Type of Residence Private Residence   Is your private residence a single family home or apartment? Single family home   Number of Stairs, Within Home, Primary none   Stair Railings, Within Home, Primary railings safe and in good condition   Once home, are you able to live on one level? Yes   Which level? Main Level   Bathroom Shower/Tub Walk-in shower   Equipment Currently Used at Home cane, straight;raised toilet seat   Support System   Support Systems Spouse/Significant Other   Do you have someone available to stay with you one or two nights once you are home? Yes   Medical Clearance   Date of Physical 06/06/24   [facility]   It is recommended that you call and check with any specialty providers before surgery to see if you need surgical clearance.  Do you see any specialty providers outside of your primary care provider? No   Blood   Known Bleeding Disorder or Coagulopathy No   Does the patient have any Voodoo/cultural preferences related to blood products? No   Education   Has the patient scheduled or completed pre-op total joint education, either in class or online, in the last 12 months? No   Relationship/Living Environment   Name(s) of People in Home malena

## 2024-06-10 NOTE — MEDICATION SCRIBE - ADMISSION MEDICATION HISTORY
Medication Scribe Admission Medication History    Admission medication history is complete. The information provided in this note is only as accurate as the sources available at the time of the update.    Information Source(s): Patient and CareEverywhere/SureScripts via  with patient in room and finished at desk.    Pertinent Information: Patient was able to answer med history questions with little to no prompting.  Took no oral medicines today.    Changes made to PTA medication list:  Added: None  Deleted: Aspirin 81 mg(she stated that she was told that she did not need to take this)  Changed: Naproxen from daily to daily prn.    Allergies reviewed with patient and updates made in EHR: yes, no change.    Medication History Completed By: Erlinda Viera 6/10/2024 9:08 AM    PTA Med List   Medication Sig Last Dose    acetaminophen (TYLENOL) 325 MG tablet Take 2 tablets (650 mg) by mouth every 6 hours Past Week    betamethasone valerate (VALISONE) 0.1 % cream [BETAMETHASONE VALERATE (VALISONE) 0.1 % CREAM] Apply 1 application topically 2 (two) times a day as needed.  6/10/2024 at am    calcium carbonate (OS-VADIM) 600 mg calcium (1,500 mg) tablet [CALCIUM CARBONATE (OS-VADIM) 600 MG CALCIUM (1,500 MG) TABLET] Take 600 mg by mouth daily.  6/9/2024 at pm    cholecalciferol, vitamin D3, 1,000 unit tablet [CHOLECALCIFEROL, VITAMIN D3, 1,000 UNIT TABLET] Take 1,000 Units by mouth daily.  6/9/2024 at am    Flaxseed, Linseed, (FLAX SEED OIL) 1000 MG capsule Take 1 capsule by mouth daily 6/8/2024 at am    levothyroxine (SYNTHROID/LEVOTHROID) 100 MCG tablet Take 100 mcg by mouth daily 6/9/2024 at am    lisinopril-hydrochlorothiazide (ZESTORETIC) 20-12.5 MG tablet Take 1 tablet by mouth every evening 6/9/2024 at pm    naproxen sodium (ALEVE) 220 MG tablet Take 220 mg by mouth daily as needed for moderate pain Past Week at prn    omeprazole (PRILOSEC) 40 MG capsule [OMEPRAZOLE (PRILOSEC) 40 MG CAPSULE] Take 40 mg by mouth daily.   6/9/2024 at am    ondansetron (ZOFRAN ODT) 4 MG ODT tab Take 1 tablet (4 mg) by mouth every 6 hours as needed for nausea or vomiting Past Month at prn    oxyCODONE (ROXICODONE) 5 MG tablet Take 1 tablet (5 mg) by mouth every 4 hours as needed for severe pain (IF pain not managed with non-pharmacological and non-opioid interventions) Past Month at prn has some left at home    pravastatin (PRAVACHOL) 40 MG tablet [PRAVASTATIN (PRAVACHOL) 40 MG TABLET] Take 40 mg by mouth at bedtime.  6/9/2024 at hs

## 2024-06-10 NOTE — PROCEDURES
Christy 10, 2024    PREOPERATIVE DIAGNOSIS:     1. Left proximal humerus fracture in setting of severe glenohumeral arthritis       POSTOPERATIVE DIAGNOSIS:     1. Left proximal humerus fracture in setting of severe glenohumeral arthritis       PROCEDURES:     1. Reverse left total shoulder replacement   2. Left shoulder biceps tenodesis      SURGEON:  Chago Iqbal MD       ASSISTANT:  Lydia Patterson PA-C.  The presence of a PA was necessary for position, retraction, and safe progression of the case.       ANESTHESIA:  Interscalene block with general.       ESTIMATED BLOOD LOSS:  300 mL.       COMPLICATIONS:  None apparent.       DISPOSITION:  Stable to PACU.       IMPLANTS USED:      Tornier Revive Reverse shoulder system with:                         90mm x 15mm distal stem, 20mm x 15mm spacer, and 15mm proximal body (for total humeral length of 150mm)                         25mm +3mm Perform lateralized baseplate with two  5.0mm nonlocking and two 5.0mm locking screws screws peripherally, 36mm standard glenosphere.       INDICATIONS: Mary is a 74 year old female with a long history regarding her shoulder.  She has a long history of shoulder pain due to glenohumeral arthritis.  Unfortunate, she fell on 5/23/2024 and sustained left proximal humerus fracture.  She was seen in clinic and discussed management options including nonoperative management, ORIF, or reverse shoulder arthroplasty.  Given the extent of her pre-existing arthritis as well as her previous symptoms, she will to move forward with a reverse shoulder replacement in order to improve her pain and optimize long-term function. She understood the risks of infection, damage to vessels and nerves, ongoing pain, loosening, acromial fracture, instability, need for further surgery.       DESCRIPTION OF PROCEDURE:  Mary was met in the preoperative holding area; the lefg shoulder was marked.  Consent was reviewed.  She had an interscalene block placed by  Anesthesia which was tolerated well.  Following this, she was transferred to the operating theater.  After smooth induction of general anesthesia, she was positioned in a beach chair position with all bony prominences well padded.  A timeout was performed verifying the correct patient, surgery, location.  She received preoperative antibiotics.  The left upper extremity was then prepped and draped in standard sterile fashion.       We started making an incision in line with a standard deltopectoral approach to the shoulder.  Dissection was sharply carried down through the skin and subcutaneous tissue.  The cephalic vein was identified and retracted laterally.  We then used a Narinder to free up the subdeltoid space.  We incised the clavicpectoral fascia and placed a Brown retractor under the deltoid.  We identified the bicipital groove and released this all the way up through the rotator interval to the glenoid.  We tenodesed the biceps to the upper rolled border of the pec with #5 ethibond.  We identified her proximal humerus and her proximal humerus fracture.  Fracture hematoma was debrided.  The subscapularis was intact on the proximal fragment and a subscap peel was performed. She had multiple large osteophytes on the inferior portion of the humeral head which were removed.      We freshened up the neck cut, with the cut exiting at the lateral portion of the tuberosity, which was in the proximal fragment of the fracture.  We provisionally reduced the fracture and cleaned out fracture hematoma.  We then used a T handle to gain access to the canal, confirming we were in the canal both proximally and distally.  With a 15mm x 90mm distal stem we had nice rotational and axial stability.  At that point, our overall construct length measured 150mm.  We assembled a trial and placed it.  We then placed a metal protective cap on top of the broach and placed retractors to expose the glenoid.  We put a guide pin in the  glenoid, cheating slightly inferiorly to minimize risk of scapular notching.  We then used the centralized glenoid reamer over the guide pin and reamed down to a bed of healthy bone for our baseplate.  Following this, we drilled our central boss for the baseplate.  We inserted a standard 25mm x +3mm lateralized baseplate.  Our central 6.5mm screw did not have good purchase.  We therefore removed it and placed two 5.0mm compression screws in the anterior and posterior hole with 5.0mm locking screws in the superior and inferior holes. Following this, we placed a standard glenosphere was placed, utilizing the set screw to engage the ballard taper.  This was then impacted in place.  We then turned our attention back to the humerus.  We placed a high eccentricity trial humeral tray, maximizing coverage over the osteotomized head, with a 36 mm standard bearing and then reduced the shoulder. Motion was external rotation to 50, with the arm abducted 90 degrees internal rotation to about 50, and forward flexion to about 160.  We then placed our final humeral stem with a very good press-fit.  We placed our final humeral tray with the 36 mm standard bearing.  The shoulder was again reduced.  Subscapularis was not repaired as it would have been under undo tension..  The wound was then soaked in dilute betadine and thoroughly irrigated.  We loosely closed the deltopectoral interval, marking it with a FiberWire suture and then running a Vicryl.  Subcutaneous layer was closed with 2-0 Vicryl and skin with staples.  A sterile compressive dressing followed by a sling was applied and Mary was awoken from anesthesia and transferred to the PACU in stable condition.       POSTOPERATIVE PLAN:   1.  Admit to hospital for pain control, PT, and IV antibiotics.   2.  Discharge home tomorrow  3.  Sling while block in effect.  Once block wears off, can remove sling and start pendulums, table crawls.          ANUEL OBRIEN MD

## 2024-06-10 NOTE — ANESTHESIA PROCEDURE NOTES
Airway       Patient location during procedure: OR       Procedure Start/Stop Times: 6/10/2024 10:30 AM  Staff -        CRNA: Luis Fernando Matta APRN CRNA       Performed By: CRNA  Consent for Airway        Urgency: elective  Indications and Patient Condition       Indications for airway management: mildred-procedural       Induction type:intravenous       Mask difficulty assessment: 1 - vent by mask    Final Airway Details       Final airway type: endotracheal airway       Successful airway: ETT - single and Oral  Endotracheal Airway Details        ETT size (mm): 6.5       Cuffed: yes       Successful intubation technique: video laryngoscopy       VL Blade Size: Jones 3       Grade View of Cords: 1       Adjucts: stylet       Position: Right       Measured from: gums/teeth       Secured at (cm): 21       Bite block used: None    Post intubation assessment        Placement verified by: capnometry, equal breath sounds and chest rise        Number of attempts at approach: 1       Number of other approaches attempted: 0       Secured with: tape       Ease of procedure: easy       Dentition: Intact and Unchanged    Medication(s) Administered   Medication Administration Time: 6/10/2024 10:30 AM

## 2024-06-10 NOTE — ANESTHESIA PROCEDURE NOTES
Brachial plexus Procedure Note    Pre-Procedure   Staff -        CRNA: Dixie Ohara APRN CRNA       Performed By: CRNA       Location: pre-op       Pre-Anesthestic Checklist: patient identified, IV checked, site marked, risks and benefits discussed, informed consent, monitors and equipment checked, pre-op evaluation, at physician/surgeon's request and post-op pain management  Timeout:       Correct Patient: Yes        Correct Procedure: Yes        Correct Site: Yes        Correct Position: Yes        Correct Laterality: Yes        Site Marked: Yes  Procedure Documentation  Procedure: Brachial plexus       Laterality: left       Patient Position: supine       Skin prep: Chloraprep       Local skin infiltrated with 0.25 mL of 1% lidocaine.  (interscalene approach).       Needle Type: insulated       Needle Gauge: 21.        Needle Length (millimeters): 100        Ultrasound guided       1. Ultrasound was used to identify targeted nerve, plexus, vascular marker, or fascial plane and place a needle adjacent to it in real-time.       2. Ultrasound was used to visualize the spread of anesthetic in close proximity to the above referenced structure.       3. A permanent image is entered into the patient's record.       4. The visualized anatomic structures appeared normal.       5. There were no apparent abnormal pathologic findings.       Nerve Stim: Initial Level 1 mA.  Lowest motor response 0.4 mA.    Assessment/Narrative         The placement was negative for: blood aspirated, painful injection and site bleeding       Paresthesias: No.       Bolus given via needle. no blood aspirated via catheter.        Secured via.        Insertion/Infusion Method: Single Shot       Complications: none    Medication(s) Administered   Ropivacaine 0.5% PF (Infiltration) - Infiltration   30 mL - 6/10/2024 9:58:00 AM  Dexamethasone 10 mg/mL PF (Perineural) - Perineural   4 mg - 6/10/2024 9:58:00 AM    FOR [facility] ([facility]) ONLY:    "Pain Team Contact information: please page the Pain Team Via [facility]. Search \"Pain\". During daytime hours, please page the attending first. At night please page the resident first.      "

## 2024-06-10 NOTE — OR NURSING
Pt here alone. Pt in pain 7/10 today. Pillow for support. No n/t. Pulse palp. Vss. Glucose 88. Nsr. Preop meds given and ready for block. Pt urinated preop.

## 2024-06-10 NOTE — PROGRESS NOTES
Writer agrees with Primary RN's skin assessment findings.   Laxmi Cade RN on 6/10/2024 at 4:49 PM

## 2024-06-10 NOTE — ANESTHESIA POSTPROCEDURE EVALUATION
Patient: Mary Martino    Procedure: Procedure(s):  Reverse total shoulder arthroplasty       Anesthesia Type:  General    Note:  Disposition: Admission   Postop Pain Control: Uneventful            Sign Out: Well controlled pain   PONV: No   Neuro/Psych: Uneventful            Sign Out: Acceptable/Baseline neuro status   Airway/Respiratory: Uneventful            Sign Out: Acceptable/Baseline resp. status   CV/Hemodynamics: Uneventful            Sign Out: Acceptable CV status; No obvious hypovolemia; No obvious fluid overload   Other NRE: NONE   DID A NON-ROUTINE EVENT OCCUR? No           Last vitals:  Vitals Value Taken Time   BP 96/60 06/10/24 1315   Temp 35.5  C (95.9  F) 06/10/24 1320   Pulse 70 06/10/24 1320   Resp 18 06/10/24 1320   SpO2 95 % 06/10/24 1320   Vitals shown include unfiled device data.    Electronically Signed By: BUTCH Saavedra CRNA  Christy 10, 2024  4:12 PM

## 2024-06-11 ENCOUNTER — APPOINTMENT (OUTPATIENT)
Dept: OCCUPATIONAL THERAPY | Facility: CLINIC | Age: 74
End: 2024-06-11
Attending: ORTHOPAEDIC SURGERY
Payer: COMMERCIAL

## 2024-06-11 LAB
CREAT SERPL-MCNC: 1.06 MG/DL (ref 0.51–0.95)
EGFRCR SERPLBLD CKD-EPI 2021: 55 ML/MIN/1.73M2
FASTING STATUS PATIENT QL REPORTED: YES
GLUCOSE BLDC GLUCOMTR-MCNC: 103 MG/DL (ref 70–99)
GLUCOSE SERPL-MCNC: 106 MG/DL (ref 70–99)
HGB BLD-MCNC: 8.1 G/DL (ref 11.7–15.7)
HGB BLD-MCNC: 8.4 G/DL (ref 11.7–15.7)
PLATELET # BLD AUTO: 234 10E3/UL (ref 150–450)

## 2024-06-11 PROCEDURE — 85018 HEMOGLOBIN: CPT | Performed by: ORTHOPAEDIC SURGERY

## 2024-06-11 PROCEDURE — 85018 HEMOGLOBIN: CPT | Mod: 91

## 2024-06-11 PROCEDURE — 250N000013 HC RX MED GY IP 250 OP 250 PS 637: Performed by: ORTHOPAEDIC SURGERY

## 2024-06-11 PROCEDURE — 258N000003 HC RX IP 258 OP 636: Mod: JZ

## 2024-06-11 PROCEDURE — 250N000013 HC RX MED GY IP 250 OP 250 PS 637

## 2024-06-11 PROCEDURE — 82962 GLUCOSE BLOOD TEST: CPT

## 2024-06-11 PROCEDURE — 82947 ASSAY GLUCOSE BLOOD QUANT: CPT | Performed by: ORTHOPAEDIC SURGERY

## 2024-06-11 PROCEDURE — 99204 OFFICE O/P NEW MOD 45 MIN: CPT

## 2024-06-11 PROCEDURE — 250N000011 HC RX IP 250 OP 636: Performed by: ORTHOPAEDIC SURGERY

## 2024-06-11 PROCEDURE — 36415 COLL VENOUS BLD VENIPUNCTURE: CPT

## 2024-06-11 PROCEDURE — 36415 COLL VENOUS BLD VENIPUNCTURE: CPT | Performed by: ORTHOPAEDIC SURGERY

## 2024-06-11 PROCEDURE — 85049 AUTOMATED PLATELET COUNT: CPT

## 2024-06-11 PROCEDURE — 82565 ASSAY OF CREATININE: CPT

## 2024-06-11 RX ORDER — LISINOPRIL AND HYDROCHLOROTHIAZIDE 12.5; 2 MG/1; MG/1
1 TABLET ORAL EVERY EVENING
Status: DISCONTINUED | OUTPATIENT
Start: 2024-06-11 | End: 2024-06-11

## 2024-06-11 RX ORDER — PRAVASTATIN SODIUM 20 MG
40 TABLET ORAL AT BEDTIME
Status: DISCONTINUED | OUTPATIENT
Start: 2024-06-11 | End: 2024-06-12 | Stop reason: HOSPADM

## 2024-06-11 RX ORDER — LISINOPRIL AND HYDROCHLOROTHIAZIDE 12.5; 2 MG/1; MG/1
1 TABLET ORAL EVERY EVENING
Status: DISCONTINUED | OUTPATIENT
Start: 2024-06-12 | End: 2024-06-12 | Stop reason: HOSPADM

## 2024-06-11 RX ORDER — PANTOPRAZOLE SODIUM 40 MG/1
40 TABLET, DELAYED RELEASE ORAL
Status: DISCONTINUED | OUTPATIENT
Start: 2024-06-11 | End: 2024-06-12 | Stop reason: HOSPADM

## 2024-06-11 RX ORDER — LEVOTHYROXINE SODIUM 100 UG/1
100 TABLET ORAL
Status: DISCONTINUED | OUTPATIENT
Start: 2024-06-11 | End: 2024-06-12 | Stop reason: HOSPADM

## 2024-06-11 RX ADMIN — SODIUM CHLORIDE, POTASSIUM CHLORIDE, SODIUM LACTATE AND CALCIUM CHLORIDE 1000 ML: 600; 310; 30; 20 INJECTION, SOLUTION INTRAVENOUS at 07:48

## 2024-06-11 RX ADMIN — ASPIRIN 81 MG: 81 TABLET ORAL at 19:29

## 2024-06-11 RX ADMIN — ASPIRIN 81 MG: 81 TABLET ORAL at 00:49

## 2024-06-11 RX ADMIN — LEVOTHYROXINE SODIUM 100 MCG: 100 TABLET ORAL at 07:00

## 2024-06-11 RX ADMIN — SODIUM CHLORIDE, POTASSIUM CHLORIDE, SODIUM LACTATE AND CALCIUM CHLORIDE: 600; 310; 30; 20 INJECTION, SOLUTION INTRAVENOUS at 17:30

## 2024-06-11 RX ADMIN — PANTOPRAZOLE SODIUM 40 MG: 40 TABLET, DELAYED RELEASE ORAL at 07:00

## 2024-06-11 RX ADMIN — PANTOPRAZOLE SODIUM 40 MG: 40 TABLET, DELAYED RELEASE ORAL at 15:57

## 2024-06-11 RX ADMIN — PRAVASTATIN SODIUM 40 MG: 20 TABLET ORAL at 21:19

## 2024-06-11 RX ADMIN — ACETAMINOPHEN 975 MG: 325 TABLET, FILM COATED ORAL at 08:14

## 2024-06-11 RX ADMIN — OXYCODONE HYDROCHLORIDE 5 MG: 5 TABLET ORAL at 04:31

## 2024-06-11 RX ADMIN — CEFAZOLIN 1 G: 1 INJECTION, POWDER, FOR SOLUTION INTRAMUSCULAR; INTRAVENOUS at 02:12

## 2024-06-11 RX ADMIN — HYDROXYZINE HYDROCHLORIDE 10 MG: 10 TABLET, FILM COATED ORAL at 04:31

## 2024-06-11 RX ADMIN — ACETAMINOPHEN 975 MG: 325 TABLET, FILM COATED ORAL at 00:49

## 2024-06-11 RX ADMIN — OXYCODONE HYDROCHLORIDE 2.5 MG: 5 TABLET ORAL at 20:46

## 2024-06-11 RX ADMIN — ASPIRIN 81 MG: 81 TABLET ORAL at 08:15

## 2024-06-11 RX ADMIN — ACETAMINOPHEN 975 MG: 325 TABLET, FILM COATED ORAL at 15:57

## 2024-06-11 ASSESSMENT — ACTIVITIES OF DAILY LIVING (ADL)
ADLS_ACUITY_SCORE: 24
ADLS_ACUITY_SCORE: 25
ADLS_ACUITY_SCORE: 24
ADLS_ACUITY_SCORE: 25
ADLS_ACUITY_SCORE: 24
ADLS_ACUITY_SCORE: 25
ADLS_ACUITY_SCORE: 25
ADLS_ACUITY_SCORE: 24
ADLS_ACUITY_SCORE: 24
ADLS_ACUITY_SCORE: 25
ADLS_ACUITY_SCORE: 25
ADLS_ACUITY_SCORE: 24

## 2024-06-11 NOTE — PROGRESS NOTES
Patient vital signs are at baseline: Yes  Patient able to ambulate as they were prior to admission or with assist devices provided by therapies during their stay:  Yes  Patient MUST void prior to discharge:  No, Reason:  voids spontaneously.   Patient able to tolerate oral intake:  Yes  Pain has adequate pain control using Oral analgesics:  Yes  Does patient have an identified :  Yes, home with   Has goal D/C date and time been discussed with patient:  Yes     Plan: Home with  6/11/24.    Rhianna Coffey RN on 6/11/2024 at 5:17 AM

## 2024-06-11 NOTE — PROGRESS NOTES
"SPIRITUAL HEALTH SERVICES  [facility] - Med Surg    Referral Source: Patient request    - Mary was dozing in her recliner with the shades drawn when I visited.  She stated that she was trying to get rest and let her blood pressure normalize.  She stated she has been experiencing dizziness.    - She is expecting to discharge home tomorrow where her  will provide \"good care\" for her.  I offered to provide prayer for her, which she welcomed.  She identified as Episcopal.      - SH provided supportive listening and encouragement as well as exploring if there were any other needs that Mary had at this time.    Plan: SH will remain available for any ongoing support needs during LOS.    Mohamud Higgins M.A., [facility]  Staff Chaplain [facility]  Office: [phone]    "

## 2024-06-11 NOTE — CONSULTS
Care Management Note:    Care Management team received referral from Ortho team to assist pt with discharge planning services post surgical services.    Per IDT rounds, EMR review, and/or discussion with PT/OT staff, it has been determined that pt will discharge to home with family assist.    [facility] provider group aware of plans.      Care Management will close referral at this time.    Odilia Boudreaux RN  Care Transitions  [phone]

## 2024-06-11 NOTE — PLAN OF CARE
"  Problem: Adult Inpatient Plan of Care  Goal: Plan of Care Review  Description: The Plan of Care Review/Shift note should be completed every shift.  The Outcome Evaluation is a brief statement about your assessment that the patient is improving, declining, or no change.  This information will be displayed automatically on your shift  note.  Outcome: Progressing  Goal: Patient-Specific Goal (Individualized)  Description: You can add care plan individualizations to a care plan. Examples of Individualization might be:  \"Parent requests to be called daily at 9am for status\", \"I have a hard time hearing out of my right ear\", or \"Do not touch me to wake me up as it startles  me\".  Outcome: Progressing  Goal: Absence of Hospital-Acquired Illness or Injury  Outcome: Progressing  Intervention: Prevent and Manage VTE (Venous Thromboembolism) Risk  Recent Flowsheet Documentation  Taken 6/11/2024 0823 by Dixie Champion, NATALIA  VTE Prevention/Management: SCDs (sequential compression devices) off  Goal: Optimal Comfort and Wellbeing  Outcome: Progressing  Intervention: Monitor Pain and Promote Comfort  Recent Flowsheet Documentation  Taken 6/11/2024 1440 by Dixie Champion, RN  Pain Management Interventions: cold applied  Taken 6/11/2024 0756 by Dixie Champion, RN  Pain Management Interventions: cold applied  Goal: Readiness for Transition of Care  Outcome: Progressing     Problem: Shoulder Arthroplasty (Total, Jimmie, Reverse)  Goal: Optimal Coping  Outcome: Progressing  Goal: Absence of Bleeding  Outcome: Progressing  Goal: Effective Bowel Elimination  Outcome: Progressing  Goal: Fluid and Electrolyte Balance  Outcome: Progressing  Goal: Optimal Functional Ability  Outcome: Progressing  Intervention: Promote Optimal Functional Status  Recent Flowsheet Documentation  Taken 6/11/2024 0823 by Dixie Champion, RN  Activity Management: up in chair  Goal: Absence of Infection Signs and Symptoms  Outcome: Progressing  Goal: Intact Neurovascular " Status  Outcome: Progressing  Goal: Anesthesia/Sedation Recovery  Outcome: Progressing  Intervention: Optimize Anesthesia Recovery  Recent Flowsheet Documentation  Taken 6/11/2024 0823 by Dixie Champion RN  Administration (IS): self-administered  Goal: Acceptable Pain Control  Outcome: Progressing  Intervention: Prevent or Manage Pain  Recent Flowsheet Documentation  Taken 6/11/2024 1440 by Dixie Champion, RN  Pain Management Interventions: cold applied  Taken 6/11/2024 0756 by Dixie Champion RN  Pain Management Interventions: cold applied  Goal: Nausea and Vomiting Relief  Outcome: Progressing  Goal: Effective Urinary Elimination  Outcome: Progressing   Goal Outcome Evaluation:      Plan of Care Reviewed With: patient    Overall Patient Progress: improvingOverall Patient Progress: improving    Patient vital signs are at baseline: Yes  Patient able to ambulate as they were prior to admission or with assist devices provided by therapies during their stay:  Yes  Patient MUST void prior to discharge:  Yes  Patient able to tolerate oral intake:  Yes  Pain has adequate pain control using Oral analgesics:  Yes  Does patient have an identified :  Yes  Has goal D/C date and time been discussed with patient:  Yes-patient wants one more night due to hypotensive issue and reports she still does feel a little dizzy at times.  She worries about her  being able to be much help today due to his neuropathy.  Receiving tylenol for pain, has not had or needed narcotics this shift  Was able to wean off oxygen at this time.  Currently is 91-92% on room air.    Plan is for one more night and discharge tomorrow.

## 2024-06-11 NOTE — PROGRESS NOTES
RRT called around 0745-see flowsheet.    BP stabilized.  Patient returned from bedside commode and had another episode of dizziness at 1000-/53.  Symptoms resolved once sitting.

## 2024-06-11 NOTE — PROGRESS NOTES
[facility] Medicine Progress Note  Date of Service: 06/11/2024    Assessment & Plan   Mary Martino is a 74 year old female who presented on 6/10/2024 for scheduled Procedure(s):  Reverse total shoulder arthroplasty by Chago Iqbal MD and is being followed by the hospital medicine service for co-management of acute and/or chronic perioperative medical problems.      S/p Procedure(s):  Reverse total shoulder arthroplasty   1 Day Post-Op    - pain control, wound cares, physical therapy, occupational therapy and DVT prophylaxis per orthopedic surgery service    Anemia, acute on chronic, normocytic  Hemoglobin 11.4 pre-op on 5/24/24 and 8.1 post-op due to acute blood loss anemia. EBL 300ml. Hgb previously WNL prior to 5/24/24. No s/s of bleeding. Vitamin D and B12 WNL 2/26/24. Hgb recheck at 1400 on 6/11 8.4.   - Hgb in am    Hypotension, acute, resolved  Pt became hypotensive when sitting in the chair 6/11 am. She was symptomatic with nausea, dizziness, diaphoresis, and decreased responsiveness. SpO2 90% on RA. She had a BP of 64/52, and 5 min into 1L of LR over 1hr she had a BP of 86/56. /51 after bolus. Symptoms resolved after fluids.  - Continue LR 100ml/hr    Abdominal pain, chronic  Pt has chronic supraumbilical abdominal pain and saw gen surg 6/5/24. Had a prior ventral hernia repair at this location in 2018. Pain worse with eating. Endorses recent weight loss, but charted weights appear stable. CT a/p 5/23/24 shows thickened stomach and lower abdominal midline hernia at lower pole of previous mesh. Gen surg wanted to do an EGD. No recommendation for surgical repair of hernia.  - Continue outpatient EGD planned for 6/27/24    Hypothyroidism  Continue PTA Levothyroxine 100    HTN  Continue PTA Lisinopril-hydrochlorothiazide 20-12.5 at bedtime on 6/12    GERD  Continue PTA Omeprazole 40    HLD  Continue PTA Pravastatin 40    CKD  Cr 1.06 on POD1. Baseline  1.1-1.2. Stable.  - IVF as above  - Cr in am    DVT Prophylaxis: as per orthopedic surgery service - Aspirin 81  Code Status: Full Code    Lines: peripheral IV   Agrawal catheter: none    Discussion: Medically, the patient appears to be progressing appropriately, but had a good hemoglobin drop and an episode of symptomatic hypotension. Will likely need to stay one more night to ensure stability.     Disposition: Anticipate discharge 6/12/24     Attestation:  I have reviewed today's vital signs, notes, medications, labs and imaging.    I have discussed, or will be discussing, the patient with hospitalist physician, Dr. Layne Moss, CHRIS       Interval History   Patient feeling okay after surgery. Had episode of dizziness, nausea, diaphoresis due to hypotension as above. Pain level 3/10. Hasn't had a bowel movement but passing flatus.     Tolerating oral intake. Denies vomiting, chest pain, cough, urinary retention, confusion, fever, chills, numbness/tingling.    Physical Exam   Temp:  [96.8  F (36  C)-99  F (37.2  C)] 98  F (36.7  C)  Pulse:  [69-84] 78  Resp:  [12-21] 15  BP: ()/(58-96) 117/60  SpO2:  [92 %-98 %] 95 %    Weights:   Vitals:    06/10/24 0755   Weight: 66.2 kg (146 lb)    Body mass index is 27.59 kg/m .    General: Awake, alert, and in no apparent distress. Pleasant and conversational, speaking in full sentences.  CV: Regular rhythm & rate, no murmurs. No edema. Peripheral pulses intact.  Respiratory: Clear to auscultation bilaterally, no wheezing, crackles, or rhonchi.  GI: Non-distended, soft, nontender to palpation. No rebound or guarding. Normoactive bowel sounds.  Skin: Warm, dry, no rashes or ecchymoses. No mottling of skin.   Musculoskeletal / extremities: Moves all extremities equally, no obvious abnormalities. Distal CMS intact.  Neurologic: No focal deficits. A&Ox4    Data   Recent Labs   Lab 06/11/24  0545 06/10/24  0801   HGB 8.1*  --    * 88       Recent Labs    Lab 06/11/24  0545 06/10/24  0801   * 88        Unresulted Labs Ordered in the Past 30 Days of this Admission       No orders found for last 31 day(s).             Imaging  Recent Results (from the past 24 hour(s))   POC US GUIDANCE NEEDLE PLACEMENT    Impression    Nerves and structures intact, see images.   XR Shoulder Left Port G/E 2 Views    Narrative    XR SHOULDER LEFT PORT G/E 2 VIEWS   6/10/2024 12:54 PM     HISTORY: Status post surgery  COMPARISON: 5/23/2024       Impression    IMPRESSION:     There are immediate postoperative changes from left reverse total  shoulder arthroplasty, in standard alignment. The humeral component of  the arthroplasty spans a nondisplaced fracture of the left humeral  neck. There is diffuse osseous demineralization.    TERRY MELENDREZ MD         SYSTEM ID:  [id number]        I reviewed all new labs and imaging results over the last 24 hours. I personally reviewed no images or EKG's today.    Medications   Current Facility-Administered Medications   Medication Dose Route Frequency Provider Last Rate Last Admin    lactated ringers infusion   Intravenous Continuous Chago Iqbal MD   Stopped at 06/11/24 0410     Current Facility-Administered Medications   Medication Dose Route Frequency Provider Last Rate Last Admin    acetaminophen (TYLENOL) tablet 975 mg  975 mg Oral Q8H Chago Iqbal MD   975 mg at 06/11/24 0049    aspirin EC tablet 81 mg  81 mg Oral BID Chago Iqbal MD   81 mg at 06/11/24 0049    levothyroxine (SYNTHROID/LEVOTHROID) tablet 100 mcg  100 mcg Oral QAM AC Kamran Moss PA-C   100 mcg at 06/11/24 0700    lisinopril-hydrochlorothiazide (ZESTORETIC) 20-12.5 MG per tablet 1 tablet  1 tablet Oral QPM Kamran Moss PA-C        pantoprazole (PROTONIX) EC tablet 40 mg  40 mg Oral BID AC Chago Iqbal MD   40 mg at 06/11/24 0700    polyethylene glycol (MIRALAX) Packet 17 g  17 g Oral Daily Chago Iqbal MD         pravastatin (PRAVACHOL) tablet 40 mg  40 mg Oral At Bedtime Kamran Moss PA-C        senna-docusate (SENOKOT-S/PERICOLACE) 8.6-50 MG per tablet 1 tablet  1 tablet Oral BID Chago Iqbal MD        sodium chloride (PF) 0.9% PF flush 3 mL  3 mL Intracatheter Q8H Chago Iqbal MD   3 mL at 06/11/24 0700     Kamran Moss PA-C

## 2024-06-11 NOTE — PROGRESS NOTES
"[facility] Orthopaedics Progress Note      Post-operative Day: 1 Day Post-Op    Procedure(s):  Reverse total shoulder arthroplasty left   Subjective:    Pt reports that she doesn't feel very good, she had a near syncopal episode this AM and thought she was going to fall. She is worried about going home.     Chest pain, SOB:  no  Nausea, vomiting:  no  Lightheadedness, dizziness:  no  Neuro:  Patient denies new onset numbness or paresthesias      Objective:  Blood pressure 105/51, pulse 70, temperature 98  F (36.7  C), resp. rate 15, height 1.549 m (5' 1\"), weight 66.2 kg (146 lb), SpO2 (!) 89%.    Patient Vitals for the past 24 hrs:   BP Temp Temp src Pulse Resp SpO2   06/11/24 0814 -- -- -- -- -- (!) 89 %   06/11/24 0808 105/51 -- -- -- -- --   06/11/24 0756 100/55 -- -- 70 -- 96 %   06/11/24 0747 (!) 86/50 -- -- 75 -- 94 %   06/11/24 0740 (!) 64/42 -- -- 64 -- 92 %   06/11/24 0017 117/60 98  F (36.7  C) -- 78 15 95 %   06/10/24 1940 116/74 98  F (36.7  C) Oral 84 18 94 %   06/10/24 1520 118/69 97.9  F (36.6  C) Oral 77 18 96 %   06/10/24 1431 (!) 144/84 -- -- 81 -- --   06/10/24 1427 (!) 152/85 -- -- -- -- 96 %   06/10/24 1344 117/68 97.7  F (36.5  C) Oral 69 18 94 %   06/10/24 1320 -- -- -- -- -- 94 %   06/10/24 1315 96/60 97.2  F (36.2  C) -- 70 18 94 %   06/10/24 1300 97/58 97.3  F (36.3  C) -- 73 20 94 %   06/10/24 1245 104/61 -- -- 72 20 92 %   06/10/24 1234 111/64 96.8  F (36  C) -- 80 21 93 %   06/10/24 1230 121/72 97  F (36.1  C) -- 77 21 95 %       Wt Readings from Last 4 Encounters:   06/10/24 66.2 kg (146 lb)   06/05/24 66.2 kg (146 lb)   05/23/24 64.8 kg (142 lb 13.7 oz)   05/23/24 63 kg (139 lb)       Gen: A&O x 3. NAD. Appears tired, up to the recliner.  Wound status: Covered, Aquacel dressing is c/d/i. Ecchymosis noted at the mid distal upper arm.   Circulation, motion and sensation: Hand and wrist ROM intact and equal bilaterally; distal upper extremity sensation is intact and equal bilaterally. " Fingers are warm and well perfused.   Swelling: mild    Pertinent Labs   Lab Results: personally reviewed.     Recent Labs   Lab Test 06/11/24  0545 05/24/24  0524 05/23/24  0840 02/26/24  1302   WBC  --  10.9 11.9* 6.8   HGB 8.1* 11.4* 12.0 13.1   HCT  --  33.6* 35.1 40.7   MCV  --  85 84 86    280 245 274   NA  --  139 141 140       Plan:   Continue current cares and rehabilitation.  Anticoagulation protocol: Aspirin 81 mg BID  x 30  days  Pain medications:   oxycodone, tylenol, and vistaril  Weight bearing status:  NWB  Encouraged fluids, slow progression of activities. Discussed with Kamran Moss PA-C, will continue to monitor with IV fluids ongoing.  Disposition:  Plan for discharge to home when medically stable and pain is controlled, cleared by therapy.  Possibly later today if improved.            Report completed by:  Evaristo Larsen PA-C  Date: 6/11/2024  Time: 10:01 AM

## 2024-06-11 NOTE — PROGRESS NOTES
06/11/24 1200   Appointment Info   Signing Clinician's Name / Credentials (OT) Dorian Rodriguez OTR/L   Living Environment   People in Home spouse   Current Living Arrangements house   Home Accessibility stairs to enter home;stairs within home   Number of Stairs, Main Entrance 2   Stair Railings, Main Entrance railings safe and in good condition;railings on both sides of stairs   Number of Stairs, Within Home, Primary none   Stair Railings, Within Home, Primary railings safe and in good condition   Transportation Anticipated family or friend will provide   Living Environment Comments All needs met on the main level, doesn't utilize cane or walker at baseline.   Self-Care   Usual Activity Tolerance moderate   Current Activity Tolerance fair   Regular Exercise No   Equipment Currently Used at Home cane, straight;raised toilet seat   Fall history within last six months yes   Number of times patient has fallen within last six months 1   Activity/Exercise/Self-Care Comment At baseline, pt is IND in all ADLs   General Information   Onset of Illness/Injury or Date of Surgery 06/10/24   Referring Physician Chago Iqbal   Patient/Family Therapy Goal Statement (OT) to get home   Additional Occupational Profile Info/Pertinent History of Current Problem s/p left reverse TSA   Existing Precautions/Restrictions shoulder;weight bearing   Left Upper Extremity (Weight-bearing Status) non weight-bearing (NWB)   Cognitive Status Examination   Orientation Status orientation to person, place and time   Visual Perception   Visual Impairment/Limitations WFL   Pain Assessment   Patient Currently in Pain No   Range of Motion Comprehensive   Comment, General Range of Motion R UE WFL, left elbow WFL   Upper Extremity (Manual Muscle Testing)   Comment, MMT: Upper Extremity WFL   Coordination   Coordination Comments L UE impaired post surgery   Transfers   Transfers No deficits identified   Activities of Daily Living   BADL  Assessment/Intervention   (increased assistance due to precautions)   Clinical Impression   Criteria for Skilled Therapeutic Interventions Met (OT) Yes, treatment indicated   OT Diagnosis decreased ADL independence   OT Problem List-Impairments impacting ADL problems related to;activity tolerance impaired;pain;post-surgical precautions;strength;range of motion (ROM)   Assessment of Occupational Performance 3-5 Performance Deficits   Identified Performance Deficits dressing, bathing, toileting, grooming   Planned Therapy Interventions (OT) ADL retraining;IADL retraining;transfer training;strengthening;progressive activity/exercise;home program guidelines   Clinical Decision Making Complexity (OT) problem focused assessment/low complexity   Risk & Benefits of therapy have been explained evaluation/treatment results reviewed;patient   Clinical Impression Comments Pt with decreased activity tolerance and independence with ADL resulting in decreased independence with ADL.  Pt would benefit from skilled OT services to increase safety and independence with ADL   OT Total Evaluation Time   OT Eval, Low Complexity Minutes (90495) 9   OT Goals   Therapy Frequency (OT) Daily   OT Predicted Duration/Target Date for Goal Attainment 06/14/24   OT Goals Hygiene/Grooming;Upper Body Dressing;Toilet Transfer/Toileting;Lower Body Dressing   OT: Hygiene/Grooming modified independent   OT: Upper Body Dressing Supervision/stand-by assist;including set-up/clothing retrieval   OT: Lower Body Dressing Supervision/stand-by assist;including set-up/clothing retrieval   OT: Toilet Transfer/Toileting Supervision/stand-by assist;cleaning and garment management   Self-Care/Home Management   Self-Care/Home Mgmt/ADL, Compensatory, Meal Prep Minutes (68879) 15   Treatment Detail/Skilled Intervention Therapsit providing education on dressing techniques following surgery.  THerapsit reviewing post surgical precautions in relation to ADL.  Therapsit  provided education on dressing technique and sling mgmt.  Pt verbalizing understanding of this education during session.   Therapeutic Procedures/Exercise   Therapeutic Procedure: strength, endurance, ROM, flexibillity minutes (84783) 10   Treatment Detail/Skilled Intervention Therapist promoted engagement in OOB activity to further build strength and endurance.  Therapsit issuing handout on UE exercises after shoulder surgery.  Pt demonstrating understanding of all exercises.  Pt standing and ambulating ~5ft and then demonstrating pendulum exercises.  Pt demonstrating technique well during session.   OT Discharge Planning   OT Plan dressing, reviewing HEP, toileting, progress ambulation   OT Discharge Recommendation (DC Rec) home with assist   OT Rationale for DC Rec Pt moving well, anticipate with continued recovery time and improved dizziness will be able to return home with assistance from family.   Total Session Time   Timed Code Treatment Minutes 25   Total Session Time (sum of timed and untimed services) 34                                                                                     [facility]      OUTPATIENT OCCUPATIONAL THERAPY  EVALUATION  PLAN OF TREATMENT FOR OUTPATIENT REHABILITATION  (COMPLETE FOR INITIAL CLAIMS ONLY)  Patient's Last Name, First Name, M.I.  YOB: 1950  Mary Martino                          Provider's Name  [facility] Medical Record No.  [id number]                               Onset Date:  06/10/24   Start of Care Date:   6/11/24     Type:     ___PT   _X_OT   ___SLP Medical Diagnosis:   s/p left reverse TSA                        OT Diagnosis:  decreased ADL independence   Visits from SOC:  1   _________________________________________________________________________________  Plan of Treatment/Functional Goals    Planned Interventions: ADL retraining, IADL retraining, transfer training,  strengthening, progressive activity/exercise, home program guidelines   Goals: See Occupational Therapy Goals on Care Plan in [facility] electronic health record.    Therapy Frequency: Daily  Predicted Duration of Therapy Intervention: 06/14/24  _________________________________________________________________________________    I CERTIFY THE NEED FOR THESE SERVICES FURNISHED UNDER        THIS PLAN OF TREATMENT AND WHILE UNDER MY CARE     (Physician attestation of this document indicates review and certification of the therapy plan).               ,      Referring Physician: Chago Iqbal            Initial Assessment        See Occupational Therapy evaluation dated   in Epic electronic health record.

## 2024-06-11 NOTE — PLAN OF CARE
"Goal Outcome Evaluation:      Plan of Care Reviewed With: patient    Overall Patient Progress: improvingOverall Patient Progress: improving      Problem: Adult Inpatient Plan of Care  Goal: Plan of Care Review  Description: The Plan of Care Review/Shift note should be completed every shift.  The Outcome Evaluation is a brief statement about your assessment that the patient is improving, declining, or no change.  This information will be displayed automatically on your shift  note.  Outcome: Progressing  Flowsheets (Taken 6/10/2024 1921)  Plan of Care Reviewed With: patient  Overall Patient Progress: improving  Goal: Patient-Specific Goal (Individualized)  Description: You can add care plan individualizations to a care plan. Examples of Individualization might be:  \"Parent requests to be called daily at 9am for status\", \"I have a hard time hearing out of my right ear\", or \"Do not touch me to wake me up as it startles  me\".  Outcome: Progressing  Goal: Absence of Hospital-Acquired Illness or Injury  Outcome: Progressing  Intervention: Prevent and Manage VTE (Venous Thromboembolism) Risk  Recent Flowsheet Documentation  Taken 6/10/2024 1500 by Dixie Champion RN  VTE Prevention/Management: SCDs (sequential compression devices) on  Goal: Optimal Comfort and Wellbeing  Outcome: Progressing  Goal: Readiness for Transition of Care  Outcome: Progressing     Problem: Shoulder Arthroplasty (Total, Jimmie, Reverse)  Goal: Optimal Coping  Outcome: Progressing  Goal: Absence of Bleeding  Outcome: Progressing  Goal: Effective Bowel Elimination  Outcome: Progressing  Goal: Fluid and Electrolyte Balance  Outcome: Progressing  Goal: Optimal Functional Ability  Outcome: Progressing  Goal: Absence of Infection Signs and Symptoms  Outcome: Progressing  Goal: Intact Neurovascular Status  Outcome: Progressing  Goal: Anesthesia/Sedation Recovery  Outcome: Progressing  Intervention: Optimize Anesthesia Recovery  Recent Flowsheet " Documentation  Taken 6/10/2024 1500 by Dixie Champion, NATALIA  Administration (IS): instruction provided, initial  Level Incentive Spirometer (mL): 1200  Number of Repetitions (IS): 5  Goal: Acceptable Pain Control  Outcome: Progressing  Goal: Nausea and Vomiting Relief  Outcome: Progressing  Goal: Effective Urinary Elimination  Outcome: Progressing     Up in chair, eating and voided x 1.  No pain, scheduled tylenol given.

## 2024-06-11 NOTE — PROGRESS NOTES
No further episodes, reports it went well with OT therapy.  She would like to stay one more night.  Still on 2 lpm nasal cannula.     Discussed with PT and order released for PT.  OT felt no PT needs.  PT clarified with Kamran and plan in place:  will check afternoon hgb, OT will recheck with patient and if PT warranted, they will assess at that time.

## 2024-06-11 NOTE — PLAN OF CARE
Goal Outcome Evaluation:         Receiving scheduled Tylenol for left shoulder discomfort, experiencing relief.  Dressing dry and intact, left arm sling intact.  Up with stand by assist.

## 2024-06-12 VITALS
HEART RATE: 83 BPM | TEMPERATURE: 97.9 F | BODY MASS INDEX: 27.56 KG/M2 | RESPIRATION RATE: 16 BRPM | HEIGHT: 61 IN | WEIGHT: 146 LBS | DIASTOLIC BLOOD PRESSURE: 67 MMHG | SYSTOLIC BLOOD PRESSURE: 113 MMHG | OXYGEN SATURATION: 91 %

## 2024-06-12 LAB
CREAT SERPL-MCNC: 0.99 MG/DL (ref 0.51–0.95)
EGFRCR SERPLBLD CKD-EPI 2021: 60 ML/MIN/1.73M2
FASTING STATUS PATIENT QL REPORTED: NORMAL
GLUCOSE SERPL-MCNC: 98 MG/DL (ref 70–99)
HGB BLD-MCNC: 7.8 G/DL (ref 11.7–15.7)
WBC # BLD AUTO: 5.1 10E3/UL (ref 4–11)

## 2024-06-12 PROCEDURE — 82565 ASSAY OF CREATININE: CPT

## 2024-06-12 PROCEDURE — 36415 COLL VENOUS BLD VENIPUNCTURE: CPT | Performed by: ORTHOPAEDIC SURGERY

## 2024-06-12 PROCEDURE — 82947 ASSAY GLUCOSE BLOOD QUANT: CPT | Performed by: ORTHOPAEDIC SURGERY

## 2024-06-12 PROCEDURE — 99214 OFFICE O/P EST MOD 30 MIN: CPT

## 2024-06-12 PROCEDURE — 85018 HEMOGLOBIN: CPT | Performed by: ORTHOPAEDIC SURGERY

## 2024-06-12 PROCEDURE — 999N000147 HC STATISTIC PT IP EVAL DEFER

## 2024-06-12 PROCEDURE — 258N000003 HC RX IP 258 OP 636: Mod: JZ

## 2024-06-12 PROCEDURE — 85048 AUTOMATED LEUKOCYTE COUNT: CPT

## 2024-06-12 PROCEDURE — 250N000013 HC RX MED GY IP 250 OP 250 PS 637

## 2024-06-12 PROCEDURE — 250N000013 HC RX MED GY IP 250 OP 250 PS 637: Performed by: ORTHOPAEDIC SURGERY

## 2024-06-12 RX ADMIN — PANTOPRAZOLE SODIUM 40 MG: 40 TABLET, DELAYED RELEASE ORAL at 06:10

## 2024-06-12 RX ADMIN — LEVOTHYROXINE SODIUM 100 MCG: 100 TABLET ORAL at 06:10

## 2024-06-12 RX ADMIN — SODIUM CHLORIDE, POTASSIUM CHLORIDE, SODIUM LACTATE AND CALCIUM CHLORIDE: 600; 310; 30; 20 INJECTION, SOLUTION INTRAVENOUS at 00:29

## 2024-06-12 RX ADMIN — ACETAMINOPHEN 975 MG: 325 TABLET, FILM COATED ORAL at 08:18

## 2024-06-12 RX ADMIN — HYDROXYZINE HYDROCHLORIDE 10 MG: 10 TABLET, FILM COATED ORAL at 00:28

## 2024-06-12 RX ADMIN — HYDROXYZINE HYDROCHLORIDE 10 MG: 10 TABLET, FILM COATED ORAL at 08:18

## 2024-06-12 RX ADMIN — ACETAMINOPHEN 975 MG: 325 TABLET, FILM COATED ORAL at 00:25

## 2024-06-12 RX ADMIN — ASPIRIN 81 MG: 81 TABLET ORAL at 08:18

## 2024-06-12 ASSESSMENT — ACTIVITIES OF DAILY LIVING (ADL)
ADLS_ACUITY_SCORE: 24

## 2024-06-12 NOTE — PLAN OF CARE
Problem: Shoulder Arthroplasty (Total, Jimmie, Reverse)  Goal: Optimal Coping  Outcome: Progressing  Goal: Absence of Bleeding  Outcome: Progressing  Goal: Effective Bowel Elimination  Outcome: Progressing  Goal: Fluid and Electrolyte Balance  Outcome: Progressing  Goal: Optimal Functional Ability  Outcome: Progressing  Intervention: Promote Optimal Functional Status  Recent Flowsheet Documentation  Taken 6/12/2024 0000 by Devick, Karen M, RN  Assistive Device Utilized: gait belt  Activity Management:   activity adjusted per tolerance   up in chair  Taken 6/11/2024 2000 by Devick, Karen M, RN  Assistive Device Utilized: gait belt  Activity Management:   activity adjusted per tolerance   up in chair  Goal: Absence of Infection Signs and Symptoms  Outcome: Progressing  Goal: Intact Neurovascular Status  Outcome: Progressing  Goal: Anesthesia/Sedation Recovery  Outcome: Progressing  Intervention: Optimize Anesthesia Recovery  Recent Flowsheet Documentation  Taken 6/12/2024 0000 by Devick, Karen M, RN  Safety Promotion/Fall Prevention:   activity supervised   clutter free environment maintained   nonskid shoes/slippers when out of bed   room organization consistent   room near nurse's station   safety round/check completed   supervised activity  Administration (IS): self-administered  Taken 6/11/2024 2000 by Devick, Karen M, RN  Safety Promotion/Fall Prevention:   activity supervised   clutter free environment maintained   nonskid shoes/slippers when out of bed   room organization consistent   room near nurse's station   safety round/check completed   supervised activity  Administration (IS): self-administered  Level Incentive Spirometer (mL): 1000  Number of Repetitions (IS): 2  Goal: Acceptable Pain Control  Outcome: Progressing  Intervention: Prevent or Manage Pain  Recent Flowsheet Documentation  Taken 6/11/2024 2000 by Devick, Karen M, RN  Pain Management Interventions: cold applied  Goal: Nausea and Vomiting  Relief  Outcome: Progressing  Goal: Effective Urinary Elimination  Outcome: Progressing     Goal Outcome Evaluation: Patient vital signs are at baseline: Yes  Patient able to ambulate as they were prior to admission or with assist devices provided by therapies during their stay:  Yes  Patient MUST void prior to discharge:  Yes  Patient able to tolerate oral intake:  Yes  Pain has adequate pain control using Oral analgesics:  Yes  Does patient have an identified :  Yes  Has goal D/C date and time been discussed with patient:  Yes

## 2024-06-12 NOTE — PROGRESS NOTES
"[facility] Orthopaedics Progress Note      Post-operative Day: 2 Days Post-Op    Procedure(s):  Reverse total shoulder arthroplasty left   Subjective:    Pt reports mild pain in the left shoulder, it is well controlled. She feels ready to go home today.    Chest pain, SOB:  no  Nausea, vomiting:  no  Lightheadedness, dizziness:  no  Neuro:  Patient denies new onset numbness or paresthesias      Objective:  Blood pressure 113/67, pulse 83, temperature 97.9  F (36.6  C), temperature source Oral, resp. rate 16, height 1.549 m (5' 1\"), weight 66.2 kg (146 lb), SpO2 91%.    Patient Vitals for the past 24 hrs:   BP Temp Temp src Pulse Resp SpO2   06/12/24 0731 113/67 97.9  F (36.6  C) Oral 83 16 91 %   06/11/24 2344 137/73 98.2  F (36.8  C) Oral 97 16 90 %   06/11/24 1500 123/63 98.3  F (36.8  C) Oral 95 16 93 %   06/11/24 1440 -- -- -- -- -- 91 %   06/11/24 1130 -- -- -- -- -- 94 %   06/11/24 1124 129/72 98.1  F (36.7  C) Oral 85 18 (!) 86 %   06/11/24 1000 104/53 -- -- -- -- --       Wt Readings from Last 4 Encounters:   06/10/24 66.2 kg (146 lb)   06/05/24 66.2 kg (146 lb)   05/23/24 64.8 kg (142 lb 13.7 oz)   05/23/24 63 kg (139 lb)       Gen: A&O x 3. NAD.   Wound status: Covered, Aquacel dressing is c/d/i.  Circulation, motion and sensation: Hand and wrist ROM intact and equal bilaterally; distal upper extremity sensation is intact and equal bilaterally. Fingers are warm and well perfused.   Swelling: mild    Pertinent Labs   Lab Results: personally reviewed.     Recent Labs   Lab Test 06/12/24  0537 06/11/24  1322 06/11/24  0545 05/24/24  0524 05/23/24  0840 02/26/24  1302   WBC 5.1  --   --  10.9 11.9* 6.8   HGB 7.8* 8.4* 8.1* 11.4* 12.0 13.1   HCT  --   --   --  33.6* 35.1 40.7   MCV  --   --   --  85 84 86   PLT  --   --  234 280 245 274   NA  --   --   --  139 141 140       Plan:   Continue current cares and rehabilitation.  Anticoagulation protocol: ASA 81mg BID x 30 days  Pain medications:  oxycodone, " tylenol, and vistaril  Weight bearing status:  NWB  Disposition:  Orthopedically stable. Plan for discharge to home when medically stable and pain is controlled, cleared by therapy. Later today.            Report completed by:  Evaristo Larsen PA-C  Date: 6/12/2024  Time: 9:49 AM

## 2024-06-12 NOTE — CARE PLAN
Occupational Therapy Discharge Summary    Reason for therapy discharge:    Discharged to home.    Progress towards therapy goal(s). See goals on Care Plan in [facility] electronic health record for goal details.  Goals met    Therapy recommendation(s):    No further therapy is recommended.Plan is for pt to do OP PT when medically advised by ortho

## 2024-06-12 NOTE — PROGRESS NOTES
[facility] Orthopedics Discharge Summary                                  [facility]     THOMAS STRICKLAND [id number]   Age: 74 year old  PCP: Misha Alonzo, [phone] 1950     Date of Admission:  6/10/2024  Date of Discharge::  6/12/2024 11:27 AM  Discharge Physician:  Evaristo Larsen PA-C    Code status:  Full Code    Admission Information:  Admission Diagnosis:  Proximal humerus fracture [S42.209A]    Post-Operative Day: 2 Days Post-Op     Reason for admission:  The patient was admitted for the following:Procedure(s) (LRB):  Reverse total shoulder arthroplasty (Left)    Active Problems:    S/P reverse total shoulder arthroplasty, unspecified laterality      Allergies:  Amlodipine, Codeine, and Penicillins    Following the procedure noted above the patient was transferred to the post-op floor and started on:    Therapy:  occupational therapy  Anticoagulation Plan: Aspirin 81 mg BID  for 30 days  Pain Management: oxycodone, tylenol, and vistaril   Weight bearing status: Non-weight bearing     The patient was followed and co-managed by the hospitalist service during the inpatient treatment course  Complications:  Hypotension on POD#1, symptomatic. Resolved with fluids.   Consultations:  None     Pertinent Labs   Lab Results: personally reviewed.     Recent Labs   Lab Test 06/12/24  0537 06/11/24  1322 06/11/24  0545 05/24/24  0524 05/23/24  0840 02/26/24  1302   WBC 5.1  --   --  10.9 11.9* 6.8   HGB 7.8* 8.4* 8.1* 11.4* 12.0 13.1   HCT  --   --   --  33.6* 35.1 40.7   MCV  --   --   --  85 84 86   PLT  --   --  234 280 245 274   NA  --   --   --  139 141 140          Discharge Information:  Condition at discharge: Stable  Discharge destination:  Discharged to home     Medications at discharge:  Current Discharge Medication List        START taking these medications    Details   aspirin 81 MG EC tablet Take 1 tablet (81 mg) by mouth 2 times daily  Qty: 60 tablet, Refills: 0    Associated  Diagnoses: Status post reverse arthroplasty of left shoulder      hydrOXYzine HCl (ATARAX) 10 MG tablet Take 1 tablet (10 mg) by mouth every 6 hours as needed for itching or anxiety (with pain, moderate pain)  Qty: 30 tablet, Refills: 0    Associated Diagnoses: Status post reverse arthroplasty of left shoulder      senna-docusate (SENOKOT-S/PERICOLACE) 8.6-50 MG tablet Take 1-2 tablets by mouth 2 times daily as needed for constipation Take while on oral narcotics to prevent or treat constipation.  Qty: 15 tablet, Refills: 0    Comments: While taking narcotics  Associated Diagnoses: Status post reverse arthroplasty of left shoulder           CONTINUE these medications which have CHANGED    Details   acetaminophen (TYLENOL) 325 MG tablet Take 2 tablets (650 mg) by mouth every 4 hours as needed for other (mild pain)    Associated Diagnoses: Status post reverse arthroplasty of left shoulder      oxyCODONE (ROXICODONE) 5 MG tablet Take 1-2 tablets (5-10 mg) by mouth every 4 hours as needed for moderate to severe pain  Qty: 30 tablet, Refills: 0    Associated Diagnoses: Status post reverse arthroplasty of left shoulder           CONTINUE these medications which have NOT CHANGED    Details   betamethasone valerate (VALISONE) 0.1 % cream [BETAMETHASONE VALERATE (VALISONE) 0.1 % CREAM] Apply 1 application topically 2 (two) times a day as needed.       calcium carbonate (OS-VADIM) 600 mg calcium (1,500 mg) tablet [CALCIUM CARBONATE (OS-VADIM) 600 MG CALCIUM (1,500 MG) TABLET] Take 600 mg by mouth daily.       cholecalciferol, vitamin D3, 1,000 unit tablet [CHOLECALCIFEROL, VITAMIN D3, 1,000 UNIT TABLET] Take 1,000 Units by mouth daily.       Flaxseed, Linseed, (FLAX SEED OIL) 1000 MG capsule Take 1 capsule by mouth daily      levothyroxine (SYNTHROID/LEVOTHROID) 100 MCG tablet Take 100 mcg by mouth daily      lisinopril-hydrochlorothiazide (ZESTORETIC) 20-12.5 MG tablet Take 1 tablet by mouth every evening      naproxen sodium  (ALEVE) 220 MG tablet Take 220 mg by mouth daily as needed for moderate pain      omeprazole (PRILOSEC) 40 MG capsule [OMEPRAZOLE (PRILOSEC) 40 MG CAPSULE] Take 40 mg by mouth daily.       ondansetron (ZOFRAN ODT) 4 MG ODT tab Take 1 tablet (4 mg) by mouth every 6 hours as needed for nausea or vomiting  Qty: 30 tablet, Refills: 0    Associated Diagnoses: Closed nondisplaced fracture of surgical neck of right humerus, unspecified fracture morphology, initial encounter      pravastatin (PRAVACHOL) 40 MG tablet [PRAVASTATIN (PRAVACHOL) 40 MG TABLET] Take 40 mg by mouth at bedtime.                         Follow-Up Care:  Patient should be seen in the office in 14 days by the Orthopedic Surgeon/Physician Assistant.  Call [phone] for appointment or questions.    Evaristo Larsen PA-C

## 2024-06-12 NOTE — PLAN OF CARE
Pt discharged home at 1120.  VSS, pain managed on tylenol and atarax.  Discharge instructions reviewed with pt, copy provided.  Prescriptions sent to pharmacy.  All belongings sent home with pt.

## 2024-06-12 NOTE — PROGRESS NOTES
[facility] Medicine Progress Note  Date of Service: 06/12/2024    Assessment & Plan   Mary Martino is a 74 year old female who presented on 6/10/2024 for scheduled Procedure(s):  Reverse total shoulder arthroplasty by Chago Iqbal MD and is being followed by the hospital medicine service for co-management of acute and/or chronic perioperative medical problems.      S/p Procedure(s):  Reverse total shoulder arthroplasty   2 Days Post-Op    - pain control, wound cares, physical therapy, occupational therapy and DVT prophylaxis per orthopedic surgery service    Anemia, acute on chronic, normocytic  Hemoglobin 11.4 pre-op on 5/24/24 and 8.1 post-op due to acute blood loss anemia. EBL 300ml. Hgb previously WNL prior to 5/24/24. No s/s of bleeding. Vitamin D and B12 WNL 2/26/24. Hgb recheck at 1400 on 6/11 8.4. 7.8 on discharge.  - Hgb in am     Hypotension, acute, resolved  Pt became hypotensive when sitting in the chair 6/11 am. She was symptomatic with nausea, dizziness, diaphoresis, and decreased responsiveness. SpO2 90% on RA. She had a BP of 64/52, and 5 min into 1L of LR over 1hr she had a BP of 86/56. /51 after bolus. Symptoms resolved after fluids. No recurrence on 6/12.  - Continue LR 100ml/hr     Abdominal pain, chronic  Pt has chronic supraumbilical abdominal pain and saw gen surg 6/5/24. Had a prior ventral hernia repair at this location in 2018. Pain worse with eating. Endorses recent weight loss, but charted weights appear stable. CT a/p 5/23/24 shows thickened stomach and lower abdominal midline hernia at lower pole of previous mesh. Gen surg wanted to do an EGD. No recommendation for surgical repair of hernia.  - Continue outpatient EGD planned for 6/27/24     Hypothyroidism  Continue PTA Levothyroxine 100     HTN  Continue PTA Lisinopril-hydrochlorothiazide 20-12.5 at bedtime on 6/12     GERD  Continue PTA Omeprazole 40     HLD  Continue PTA  Pravastatin 40     CKD  Cr 1.06 on POD1. Baseline 1.1-1.2. Stable. 0.9 on discharge  - IVF as above  - Cr in am      DVT Prophylaxis: as per orthopedic surgery service - Aspirin 81  Code Status: Full Code    Lines: peripheral IV   Agrawal catheter: none    Discussion: Medically, the patient appears ready for discharge.    Disposition: Anticipate discharge 6/12     Attestation:  I have reviewed today's vital signs, notes, medications, labs and imaging.    I have discussed, or will be discussing, the patient with hospitalist physician, Dr. Tillman.    Kamran Moss PA-C       Interval History   No recurrence of dizziness or hypotension. Ambulating well and tolerating PO intake. No urinary issues.    Physical Exam   Temp:  [98.1  F (36.7  C)-98.3  F (36.8  C)] 98.2  F (36.8  C)  Pulse:  [64-97] 97  Resp:  [16-18] 16  BP: ()/(42-73) 137/73  SpO2:  [86 %-96 %] 90 %    Weights:   Vitals:    06/10/24 0755   Weight: 66.2 kg (146 lb)    Body mass index is 27.59 kg/m .    General: Awake, alert, and in no apparent distress. Pleasant and conversational, speaking in full sentences.  CV: Regular rhythm & rate, no murmurs.   Respiratory: Clear to auscultation bilaterally, no wheezing, crackles, or rhonchi.  GI: Non-distended, soft, nontender to palpation. No rebound or guarding. Normoactive bowel sounds.  Skin: Warm, dry, no rashes or ecchymoses.   Musculoskeletal / extremities: Moves all extremities equally, no obvious abnormalities. Distal CMS intact.  Neurologic: No focal deficits. A&Ox4    Data   Recent Labs   Lab 06/12/24  0537 06/11/24  1322 06/11/24  0742 06/11/24  0545 06/10/24  0801   HGB 7.8* 8.4*  --  8.1*  --    PLT  --   --   --  234  --    CR  --   --   --  1.06*  --    GLC  --   --  103* 106* 88       Recent Labs   Lab 06/11/24  0742 06/11/24  0545 06/10/24  0801   * 106* 88        Unresulted Labs Ordered in the Past 30 Days of this Admission       Date and Time Order Name Status Description     6/12/2024  2:22 AM Glucose In process              Imaging  No results found for this or any previous visit (from the past 24 hour(s)).     I reviewed all new labs and imaging results over the last 24 hours. I personally reviewed no images or EKG's today.    Medications   Current Facility-Administered Medications   Medication Dose Route Frequency Provider Last Rate Last Admin    lactated ringers infusion   Intravenous Continuous Kamran Moss PA-C 100 mL/hr at 06/12/24 0029 New Bag at 06/12/24 0029     Current Facility-Administered Medications   Medication Dose Route Frequency Provider Last Rate Last Admin    acetaminophen (TYLENOL) tablet 975 mg  975 mg Oral Q8H Chago Iqbal MD   975 mg at 06/12/24 0025    aspirin EC tablet 81 mg  81 mg Oral BID Chago Iqbal MD   81 mg at 06/11/24 1929    levothyroxine (SYNTHROID/LEVOTHROID) tablet 100 mcg  100 mcg Oral QAM AC Kamran Moss PA-C   100 mcg at 06/12/24 0610    lisinopril-hydrochlorothiazide (ZESTORETIC) 20-12.5 MG per tablet 1 tablet  1 tablet Oral QPM Kamran Moss PA-C        pantoprazole (PROTONIX) EC tablet 40 mg  40 mg Oral BID AC Chago Iqbal MD   40 mg at 06/12/24 0610    polyethylene glycol (MIRALAX) Packet 17 g  17 g Oral Daily Chago Iqbal MD        pravastatin (PRAVACHOL) tablet 40 mg  40 mg Oral At Bedtime Kamran Moss PA-C   40 mg at 06/11/24 2119    senna-docusate (SENOKOT-S/PERICOLACE) 8.6-50 MG per tablet 1 tablet  1 tablet Oral BID Chago Iqbal MD        sodium chloride (PF) 0.9% PF flush 3 mL  3 mL Intracatheter Q8H Chago Iqbal MD   3 mL at 06/11/24 0700     Kamran Moss PA-C

## 2024-06-12 NOTE — PLAN OF CARE
Physical Therapy: PT order received and acknowledged. Per discussion with OT, no identified IP PT needs. Pt mobilizing without dizziness. Will discontinue PT order at this time.

## 2024-06-17 ENCOUNTER — LAB REQUISITION (OUTPATIENT)
Dept: LAB | Facility: CLINIC | Age: 74
End: 2024-06-17
Payer: COMMERCIAL

## 2024-06-17 DIAGNOSIS — R19.7 DIARRHEA, UNSPECIFIED: ICD-10-CM

## 2024-06-17 PROCEDURE — 80048 BASIC METABOLIC PNL TOTAL CA: CPT | Mod: ORL | Performed by: FAMILY MEDICINE

## 2024-06-18 LAB
ANION GAP SERPL CALCULATED.3IONS-SCNC: 13 MMOL/L (ref 7–15)
BUN SERPL-MCNC: 8.8 MG/DL (ref 8–23)
CALCIUM SERPL-MCNC: 8.8 MG/DL (ref 8.8–10.2)
CHLORIDE SERPL-SCNC: 105 MMOL/L (ref 98–107)
CREAT SERPL-MCNC: 1 MG/DL (ref 0.51–0.95)
DEPRECATED HCO3 PLAS-SCNC: 27 MMOL/L (ref 22–29)
EGFRCR SERPLBLD CKD-EPI 2021: 59 ML/MIN/1.73M2
GLUCOSE SERPL-MCNC: 87 MG/DL (ref 70–99)
POTASSIUM SERPL-SCNC: 2.9 MMOL/L (ref 3.4–5.3)
SODIUM SERPL-SCNC: 145 MMOL/L (ref 135–145)

## 2024-06-24 RX ORDER — LIDOCAINE 40 MG/G
CREAM TOPICAL
Status: CANCELLED | OUTPATIENT
Start: 2024-06-24

## 2024-06-24 RX ORDER — ONDANSETRON 2 MG/ML
4 INJECTION INTRAMUSCULAR; INTRAVENOUS
Status: CANCELLED | OUTPATIENT
Start: 2024-06-24

## 2024-06-26 ENCOUNTER — LAB REQUISITION (OUTPATIENT)
Dept: LAB | Facility: CLINIC | Age: 74
End: 2024-06-26
Payer: COMMERCIAL

## 2024-06-26 DIAGNOSIS — E87.6 HYPOKALEMIA: ICD-10-CM

## 2024-06-26 LAB — POTASSIUM SERPL-SCNC: 2.9 MMOL/L (ref 3.4–5.3)

## 2024-06-26 PROCEDURE — 84132 ASSAY OF SERUM POTASSIUM: CPT | Mod: ORL | Performed by: FAMILY MEDICINE

## 2024-06-26 RX ORDER — LIDOCAINE 40 MG/G
CREAM TOPICAL
Status: CANCELLED | OUTPATIENT
Start: 2024-06-26

## 2024-06-26 RX ORDER — POTASSIUM CHLORIDE 1125 MG/1
1 TABLET, EXTENDED RELEASE ORAL
COMMUNITY
Start: 2024-06-19 | End: 2024-06-27 | Stop reason: HOSPADM

## 2024-06-26 RX ORDER — SODIUM CHLORIDE, SODIUM LACTATE, POTASSIUM CHLORIDE, CALCIUM CHLORIDE 600; 310; 30; 20 MG/100ML; MG/100ML; MG/100ML; MG/100ML
INJECTION, SOLUTION INTRAVENOUS CONTINUOUS
Status: CANCELLED | OUTPATIENT
Start: 2024-06-26

## 2024-06-27 ENCOUNTER — ANESTHESIA (OUTPATIENT)
Dept: SURGERY | Facility: HOSPITAL | Age: 74
End: 2024-06-27
Payer: COMMERCIAL

## 2024-06-27 ENCOUNTER — PREP FOR PROCEDURE (OUTPATIENT)
Dept: SURGERY | Facility: CLINIC | Age: 74
End: 2024-06-27

## 2024-06-27 ENCOUNTER — HOSPITAL ENCOUNTER (OUTPATIENT)
Facility: HOSPITAL | Age: 74
Discharge: HOME | End: 2024-06-27
Attending: SURGERY | Admitting: SURGERY
Payer: COMMERCIAL

## 2024-06-27 ENCOUNTER — ANESTHESIA EVENT (OUTPATIENT)
Dept: SURGERY | Facility: HOSPITAL | Age: 74
End: 2024-06-27
Payer: COMMERCIAL

## 2024-06-27 VITALS
BODY MASS INDEX: 27.34 KG/M2 | HEART RATE: 64 BPM | SYSTOLIC BLOOD PRESSURE: 149 MMHG | WEIGHT: 144.7 LBS | OXYGEN SATURATION: 94 % | RESPIRATION RATE: 21 BRPM | TEMPERATURE: 97.8 F | DIASTOLIC BLOOD PRESSURE: 77 MMHG

## 2024-06-27 DIAGNOSIS — K21.9 ESOPHAGEAL REFLUX: Primary | ICD-10-CM

## 2024-06-27 LAB
HOLD SPECIMEN: NORMAL
MAGNESIUM SERPL-MCNC: 0.8 MG/DL (ref 1.7–2.3)
MAGNESIUM SERPL-MCNC: 2.5 MG/DL (ref 1.7–2.3)
POTASSIUM SERPL-SCNC: 2.7 MMOL/L (ref 3.4–5.3)
POTASSIUM SERPL-SCNC: 2.7 MMOL/L (ref 3.4–5.3)
POTASSIUM SERPL-SCNC: 2.8 MMOL/L (ref 3.4–5.3)

## 2024-06-27 PROCEDURE — 36415 COLL VENOUS BLD VENIPUNCTURE: CPT | Performed by: STUDENT IN AN ORGANIZED HEALTH CARE EDUCATION/TRAINING PROGRAM

## 2024-06-27 PROCEDURE — 250N000011 HC RX IP 250 OP 636: Performed by: STUDENT IN AN ORGANIZED HEALTH CARE EDUCATION/TRAINING PROGRAM

## 2024-06-27 PROCEDURE — 258N000003 HC RX IP 258 OP 636: Performed by: STUDENT IN AN ORGANIZED HEALTH CARE EDUCATION/TRAINING PROGRAM

## 2024-06-27 PROCEDURE — 250N000009 HC RX 250: Performed by: NURSE ANESTHETIST, CERTIFIED REGISTERED

## 2024-06-27 PROCEDURE — 88305 TISSUE EXAM BY PATHOLOGIST: CPT | Mod: TC | Performed by: SURGERY

## 2024-06-27 PROCEDURE — 710N000012 HC RECOVERY PHASE 2, PER MINUTE: Performed by: SURGERY

## 2024-06-27 PROCEDURE — 250N000011 HC RX IP 250 OP 636: Performed by: NURSE ANESTHETIST, CERTIFIED REGISTERED

## 2024-06-27 PROCEDURE — 83735 ASSAY OF MAGNESIUM: CPT | Performed by: STUDENT IN AN ORGANIZED HEALTH CARE EDUCATION/TRAINING PROGRAM

## 2024-06-27 PROCEDURE — 250N000009 HC RX 250: Performed by: SURGERY

## 2024-06-27 PROCEDURE — 999N000141 HC STATISTIC PRE-PROCEDURE NURSING ASSESSMENT: Performed by: SURGERY

## 2024-06-27 PROCEDURE — 360N000075 HC SURGERY LEVEL 2, PER MIN: Performed by: SURGERY

## 2024-06-27 PROCEDURE — 250N000013 HC RX MED GY IP 250 OP 250 PS 637: Performed by: STUDENT IN AN ORGANIZED HEALTH CARE EDUCATION/TRAINING PROGRAM

## 2024-06-27 PROCEDURE — 43239 EGD BIOPSY SINGLE/MULTIPLE: CPT | Performed by: SURGERY

## 2024-06-27 PROCEDURE — 258N000003 HC RX IP 258 OP 636: Performed by: NURSE ANESTHETIST, CERTIFIED REGISTERED

## 2024-06-27 PROCEDURE — 84132 ASSAY OF SERUM POTASSIUM: CPT | Mod: 91 | Performed by: STUDENT IN AN ORGANIZED HEALTH CARE EDUCATION/TRAINING PROGRAM

## 2024-06-27 PROCEDURE — 370N000017 HC ANESTHESIA TECHNICAL FEE, PER MIN: Performed by: SURGERY

## 2024-06-27 RX ORDER — ONDANSETRON 2 MG/ML
4 INJECTION INTRAMUSCULAR; INTRAVENOUS EVERY 30 MIN PRN
Status: DISCONTINUED | OUTPATIENT
Start: 2024-06-27 | End: 2024-06-27 | Stop reason: HOSPADM

## 2024-06-27 RX ORDER — POTASSIUM CHLORIDE 1500 MG/1
40 TABLET, EXTENDED RELEASE ORAL ONCE
Status: COMPLETED | OUTPATIENT
Start: 2024-06-27 | End: 2024-06-27

## 2024-06-27 RX ORDER — DEXAMETHASONE SODIUM PHOSPHATE 10 MG/ML
4 INJECTION, SOLUTION INTRAMUSCULAR; INTRAVENOUS
Status: DISCONTINUED | OUTPATIENT
Start: 2024-06-27 | End: 2024-06-27 | Stop reason: HOSPADM

## 2024-06-27 RX ORDER — POTASSIUM CHLORIDE 1500 MG/1
80 TABLET, EXTENDED RELEASE ORAL ONCE
Status: COMPLETED | OUTPATIENT
Start: 2024-06-27 | End: 2024-06-27

## 2024-06-27 RX ORDER — MAGNESIUM SULFATE HEPTAHYDRATE 40 MG/ML
2 INJECTION, SOLUTION INTRAVENOUS ONCE
Status: DISCONTINUED | OUTPATIENT
Start: 2024-06-27 | End: 2024-06-27

## 2024-06-27 RX ORDER — PROPOFOL 10 MG/ML
INJECTION, EMULSION INTRAVENOUS PRN
Status: DISCONTINUED | OUTPATIENT
Start: 2024-06-27 | End: 2024-06-27

## 2024-06-27 RX ORDER — SODIUM CHLORIDE, SODIUM LACTATE, POTASSIUM CHLORIDE, CALCIUM CHLORIDE 600; 310; 30; 20 MG/100ML; MG/100ML; MG/100ML; MG/100ML
INJECTION, SOLUTION INTRAVENOUS CONTINUOUS
Status: DISCONTINUED | OUTPATIENT
Start: 2024-06-27 | End: 2024-06-27 | Stop reason: HOSPADM

## 2024-06-27 RX ORDER — LIDOCAINE HYDROCHLORIDE 10 MG/ML
INJECTION, SOLUTION INFILTRATION; PERINEURAL PRN
Status: DISCONTINUED | OUTPATIENT
Start: 2024-06-27 | End: 2024-06-27

## 2024-06-27 RX ORDER — LIDOCAINE 40 MG/G
CREAM TOPICAL
Status: DISCONTINUED | OUTPATIENT
Start: 2024-06-27 | End: 2024-06-27 | Stop reason: HOSPADM

## 2024-06-27 RX ORDER — ONDANSETRON 4 MG/1
4 TABLET, ORALLY DISINTEGRATING ORAL EVERY 30 MIN PRN
Status: DISCONTINUED | OUTPATIENT
Start: 2024-06-27 | End: 2024-06-27 | Stop reason: HOSPADM

## 2024-06-27 RX ORDER — PROPOFOL 10 MG/ML
INJECTION, EMULSION INTRAVENOUS CONTINUOUS PRN
Status: DISCONTINUED | OUTPATIENT
Start: 2024-06-27 | End: 2024-06-27

## 2024-06-27 RX ORDER — MAGNESIUM SULFATE 4 G/50ML
4 INJECTION INTRAVENOUS ONCE
Status: COMPLETED | OUTPATIENT
Start: 2024-06-27 | End: 2024-06-27

## 2024-06-27 RX ORDER — POTASSIUM CHLORIDE 7.45 MG/ML
10 INJECTION INTRAVENOUS ONCE
Status: COMPLETED | OUTPATIENT
Start: 2024-06-27 | End: 2024-06-27

## 2024-06-27 RX ORDER — ONDANSETRON 2 MG/ML
4 INJECTION INTRAMUSCULAR; INTRAVENOUS
Status: DISCONTINUED | OUTPATIENT
Start: 2024-06-27 | End: 2024-06-27 | Stop reason: HOSPADM

## 2024-06-27 RX ORDER — NALOXONE HYDROCHLORIDE 0.4 MG/ML
0.1 INJECTION, SOLUTION INTRAMUSCULAR; INTRAVENOUS; SUBCUTANEOUS
Status: DISCONTINUED | OUTPATIENT
Start: 2024-06-27 | End: 2024-06-27 | Stop reason: HOSPADM

## 2024-06-27 RX ORDER — ONDANSETRON 2 MG/ML
INJECTION INTRAMUSCULAR; INTRAVENOUS PRN
Status: DISCONTINUED | OUTPATIENT
Start: 2024-06-27 | End: 2024-06-27

## 2024-06-27 RX ADMIN — PROPOFOL 50 MG: 10 INJECTION, EMULSION INTRAVENOUS at 11:04

## 2024-06-27 RX ADMIN — SODIUM CHLORIDE, POTASSIUM CHLORIDE, SODIUM LACTATE AND CALCIUM CHLORIDE: 600; 310; 30; 20 INJECTION, SOLUTION INTRAVENOUS at 10:12

## 2024-06-27 RX ADMIN — PROPOFOL 100 MCG/KG/MIN: 10 INJECTION, EMULSION INTRAVENOUS at 10:59

## 2024-06-27 RX ADMIN — MAGNESIUM SULFATE HEPTAHYDRATE 4 G: 80 INJECTION, SOLUTION INTRAVENOUS at 13:02

## 2024-06-27 RX ADMIN — POTASSIUM CHLORIDE 40 MEQ: 1500 TABLET, EXTENDED RELEASE ORAL at 12:20

## 2024-06-27 RX ADMIN — POTASSIUM CHLORIDE 10 MEQ: 7.46 INJECTION, SOLUTION INTRAVENOUS at 10:12

## 2024-06-27 RX ADMIN — POTASSIUM CHLORIDE 40 MEQ: 1500 TABLET, EXTENDED RELEASE ORAL at 16:00

## 2024-06-27 RX ADMIN — PROPOFOL 50 MG: 10 INJECTION, EMULSION INTRAVENOUS at 11:02

## 2024-06-27 RX ADMIN — DEXMEDETOMIDINE HYDROCHLORIDE 20 MCG: 100 INJECTION, SOLUTION INTRAVENOUS at 11:00

## 2024-06-27 RX ADMIN — LIDOCAINE HYDROCHLORIDE 4 ML: 10 INJECTION, SOLUTION INFILTRATION; PERINEURAL at 11:00

## 2024-06-27 RX ADMIN — ONDANSETRON 4 MG: 2 INJECTION INTRAMUSCULAR; INTRAVENOUS at 11:14

## 2024-06-27 RX ADMIN — PROPOFOL 50 MG: 10 INJECTION, EMULSION INTRAVENOUS at 11:00

## 2024-06-27 ASSESSMENT — ACTIVITIES OF DAILY LIVING (ADL)
ADLS_ACUITY_SCORE: 38

## 2024-06-27 NOTE — ANESTHESIA CARE TRANSFER NOTE
Patient: Mary Martino    Procedure: Procedure(s):  ESOPHAGOGASTRODUODENOSCOPY, WITH BIOPSY       Diagnosis: Esophageal reflux [K21.9]  Diagnosis Additional Information: No value filed.    Anesthesia Type:   MAC     Note:    Oropharynx: oropharynx clear of all foreign objects and spontaneously breathing  Level of Consciousness: awake  Oxygen Supplementation: face mask  Level of Supplemental Oxygen (L/min / FiO2): 6  Independent Airway: airway patency satisfactory and stable  Dentition: dentition unchanged  Vital Signs Stable: post-procedure vital signs reviewed and stable  Report to RN Given: handoff report given  Patient transferred to: Phase II    Handoff Report: Identifed the Patient, Identified the Reponsible Provider, Reviewed the pertinent medical history, Discussed the surgical course, Reviewed Intra-OP anesthesia mangement and issues during anesthesia, Set expectations for post-procedure period and Allowed opportunity for questions and acknowledgement of understanding      Vitals:  Vitals Value Taken Time   /60 06/27/24 1131   Temp 36.7  C (98.1  F) 06/27/24 1125   Pulse 74 06/27/24 1134   Resp 12 06/27/24 1125   SpO2 87 % 06/27/24 1134   Vitals shown include unfiled device data.    Electronically Signed By: BUTCH Mcdaniel CRNA  June 27, 2024  11:35 AM

## 2024-06-27 NOTE — INTERVAL H&P NOTE
"I have reviewed the surgical (or preoperative) H&P that is linked to this encounter, and examined the patient. There are no significant changes    Clinical Conditions Present on Arrival:  Clinically Significant Risk Factors Present on Admission        # Hypokalemia: Lowest K = 2.9 mmol/L in last 2 days, will replace as needed          # Drug Induced Platelet Defect: home medication list includes an antiplatelet medication      # Overweight: Estimated body mass index is 27.34 kg/m  as calculated from the following:    Height as of 6/10/24: 1.549 m (5' 1\").    Weight as of this encounter: 65.6 kg (144 lb 11.2 oz).       "

## 2024-06-27 NOTE — OP NOTE
POST ENDOSCOPY NOTE      Pre-op Dx:              Esophageal reflux [K21.9]      Procedure:             Procedure(s):  ESOPHAGOGASTRODUODENOSCOPY, WITH BIOPSY      Indications:            Thickening of stomach noted on CT scan, anemia      Findings:                Patient has profound number of polyps all across the greater curvature of the stomach      Procedure Note:      Patient was brought to the endoscopy suite placed in a supine position and given conscious sedation anesthesia.  Bite block was in place   The endoscope was advanced atraumatically across the oropharynx down the esophagus.  Lower esophageal sphincter area was noted at 38 cm no evidence for esophagitis.  The scope was taken into the body of the stomach, the patient had a profound number of polyps within the body of her stomach.  Some these polyps were taken with a hot snare and aspirated for pathology..  The pylorus was intubated first and second portions of the duodenum were unremarkable the scope was drawn back and retroflexed and I did not see any evidence for hiatal hernia.  This was a normal EGD and biopsies were taken near the pylorus to rule out Helicobacter pylori.  Air was aspirated from the body of the stomach and esophagus as the scope was withdrawn    EBL:                        Minimal      Medications:          MAC        Complications:      None      Post-op Dx:            Gastric polyposis,      Recommendation:   Will await the pathology results of the polypectomy.  Patient is to follow-up with me for further instructions and to determine our treatment plan moving forward.      Josue Love MD  6/27/2024 12:36 PM  [facility]  [phone]    Spoke with Anabel and order placed for pediatric neurology ASAP.

## 2024-06-27 NOTE — PROGRESS NOTES
Spoke with patient today regarding rescheduling of her EGD with . [facility] stated she can't be done there and needs to be done at the hospital. She is getting dressed and ready to head in to the hospital for the procedure as of 9:00 am- pt stated she lives in [place] so she should be there in about 20-30 minutes.   I spoke with the [facility] control desk Maribell and then spoke with Dr. Love (via Pooja) in the OR about adding the patient on at [facility] after the case he was in currently. Dr. Love agreed.   [facility] cases were moved down to a later start time to allow the change, worked with Randell at [facility] on that schedule.  New order entered for Dr. Love to sign, Control Desk put a hold to add the patient on today.

## 2024-06-27 NOTE — OR NURSING
Dr Castillo aware of pt labs; Mg 2.5 and K 2.8 post Mg infusion and oral K.  Will give pt another dose of 40meq po K prior to discharge and will discharge pt home.

## 2024-06-27 NOTE — OR NURSING
Pt magnesium 0.8, Dr. Castillo aware and orders received.  Pt K also low and pt given oral replacement for K of 2.7.  Dr. Love will be updated by RN as well.

## 2024-06-27 NOTE — ANESTHESIA PREPROCEDURE EVALUATION
Anesthesia Pre-Procedure Evaluation    Patient: Mary Martino   MRN: [id number] : 1950        Procedure : Procedure(s):  ESOPHAGOGASTRODUODENOSCOPY, WITH BIOPSY          Past Medical History:   Diagnosis Date    Depression     Gastric wall thickening     GERD (gastroesophageal reflux disease)     Hyperlipidemia     Hypertension     Hypothyroidism     h/o grave's disease    Leiomyoma of uterus     PONV (postoperative nausea and vomiting)     Seasonal allergies     Ventral hernia       Past Surgical History:   Procedure Laterality Date    APPENDECTOMY      CHOLECYSTECTOMY      COLON SURGERY  2009    partial colon resection    EYE SURGERY      for proptosis    HEMIARTHROPLASTY SHOULDER FRACTURE Right     HERNIA REPAIR      HERNIORRHAPHY VENTRAL N/A 3/13/2018    Procedure: VENTRAL HERNIA REPAIR;  Surgeon: Josue Love MD;  Location: [facility];  Service:     HUMERUS FRACTURE SURGERY      HYSTERECTOMY Bilateral 3/13/2018    Procedure: EXPLORATORY LAPAROTOMY TOTAL ABDOMINAL HYSTERECTOMY, BILATERAL SALPINGO OOPHORECTOMY PELVIC WASHINGS LYSIS OF ADHESIONS;  Surgeon: Sheri Zamarripa MD;  Location: [facility];  Service:     REVERSE ARTHROPLASTY SHOULDER Left 6/10/2024    Procedure: Reverse total shoulder arthroplasty;  Surgeon: Chago Iqbal MD;  [facility]    THYROID SURGERY      ablation    TUBAL LIGATION        Allergies   Allergen Reactions    Amlodipine Dizziness    Codeine Nausea and Vomiting    Penicillins Hives     Occurred during childhood, Other reaction(s): *Unknown - Childhood Rxn, tolerates cephalosporins w/o problems      Social History     Tobacco Use    Smoking status: Former     Current packs/day: 0.00     Types: Cigarettes     Quit date: 2000     Years since quittin.5    Smokeless tobacco: Never   Substance Use Topics    Alcohol use: Yes     Comment: Alcoholic Drinks/day: 1 drink a month       Wt Readings from Last 1 Encounters:   24 65.6 kg (144 lb  "11.2 oz)        Anesthesia Evaluation   Pt has had prior anesthetic.     History of anesthetic complications  - PONV.      ROS/MED HX  ENT/Pulmonary:       Neurologic:       Cardiovascular:     (+) Dyslipidemia hypertension- -   -  - -                                   (-) murmur   METS/Exercise Tolerance:     Hematologic:     (+)      anemia,          Musculoskeletal:  - neg musculoskeletal ROS     GI/Hepatic:     (+) GERD,                   Renal/Genitourinary:       Endo: Comment: Hypokalemia    (+)          thyroid problem, hypothyroidism,           Psychiatric/Substance Use:     (+) psychiatric history depression       Infectious Disease:       Malignancy:       Other:            Physical Exam    Airway  airway exam normal      Mallampati: II   TM distance: > 3 FB   Neck ROM: full   Mouth opening: > 3 cm    Respiratory Devices and Support         Dental       (+) Minor Abnormalities - some fillings, tiny chips      Cardiovascular   cardiovascular exam normal       Rhythm and rate: regular and normal (-) no murmur    Pulmonary   pulmonary exam normal        breath sounds clear to auscultation           OUTSIDE LABS:  CBC:   Lab Results   Component Value Date    WBC 5.1 06/12/2024    WBC 10.9 05/24/2024    HGB 7.8 (L) 06/12/2024    HGB 8.4 (L) 06/11/2024    HCT 33.6 (L) 05/24/2024    HCT 35.1 05/23/2024     06/11/2024     05/24/2024     BMP:   Lab Results   Component Value Date     06/17/2024     05/24/2024    POTASSIUM 2.9 (L) 06/26/2024    POTASSIUM 2.9 (L) 06/17/2024    CHLORIDE 105 06/17/2024    CHLORIDE 106 05/24/2024    CO2 27 06/17/2024    CO2 18 (L) 05/24/2024    BUN 8.8 06/17/2024    BUN 20.4 05/24/2024    CR 1.00 (H) 06/17/2024    CR 0.99 (H) 06/12/2024    GLC 87 06/17/2024    GLC 98 06/12/2024     COAGS: No results found for: \"PTT\", \"INR\", \"FIBR\"  POC: No results found for: \"BGM\", \"HCG\", \"HCGS\"  HEPATIC:   Lab Results   Component Value Date    ALBUMIN 4.1 02/26/2024    " "PROTTOTAL 6.7 02/26/2024    ALT 17 02/26/2024    AST 23 02/26/2024    ALKPHOS 77 02/26/2024    BILITOTAL 0.4 02/26/2024     OTHER:   Lab Results   Component Value Date    LACT 1.3 05/23/2024    VADIM 8.8 06/17/2024    LIPASE 59 12/11/2023    AMYLASE 63 12/11/2023    TSH 2.52 02/26/2024       Anesthesia Plan    ASA Status:  3    NPO Status:  NPO Appropriate    Anesthesia Type: MAC.     - Reason for MAC: straight local not clinically adequate              Consents    Anesthesia Plan(s) and associated risks, benefits, and realistic alternatives discussed. Questions answered and patient/representative(s) expressed understanding.     - Discussed: Risks, Benefits and Alternatives for BOTH SEDATION and the PROCEDURE were discussed     - Discussed with:  Patient            Postoperative Care    Pain management: IV analgesics, Oral pain medications, Multi-modal analgesia.   PONV prophylaxis: Ondansetron (or other 5HT-3), Background Propofol Infusion     Comments:    Other Comments: Risks of MAC anesthesia including but not limited to recall, awareness, and potential conversion to general anesthesia discussed.    IV potassium replacement preop. Repeat K in phase II           Joesph Castillo MD    I have reviewed the pertinent notes and labs in the chart from the past 30 days and (re)examined the patient.  Any updates or changes from those notes are reflected in this note.    # Hypokalemia: Lowest K = 2.9 mmol/L in last 2 days, will replace as needed          # Drug Induced Platelet Defect: home medication list includes an antiplatelet medication  # Overweight: Estimated body mass index is 27.34 kg/m  as calculated from the following:    Height as of 6/10/24: 1.549 m (5' 1\").    Weight as of this encounter: 65.6 kg (144 lb 11.2 oz).      "

## 2024-06-27 NOTE — ANESTHESIA POSTPROCEDURE EVALUATION
Patient: Mary Martino    Procedure: Procedure(s):  ESOPHAGOGASTRODUODENOSCOPY, WITH BIOPSY       Anesthesia Type:  MAC    Note:  Disposition: Outpatient   Postop Pain Control: Uneventful            Sign Out: Well controlled pain   PONV: No   Neuro/Psych: Uneventful            Sign Out: Acceptable/Baseline neuro status   Airway/Respiratory: Uneventful            Sign Out: Acceptable/Baseline resp. status   CV/Hemodynamics: Uneventful            Sign Out: Acceptable CV status; No obvious hypovolemia; No obvious fluid overload   Other NRE: NONE   DID A NON-ROUTINE EVENT OCCUR? No    Event details/Postop Comments:  Patient with Low potassium on arrival, FTH critically low magnesium. Repleted w/ IV magnesium and PO potassium. Patient continues to have low potassium, but now uptrending w/ magnesium replacement. Discussed ongoing admission/observation vs close follow up with primary phsyician as she had been. Patient would like to go home, will call her primary physician for follow up today. Red flag symptoms/reasons to return discussed and she expressed understanding. All questions answered.           Last vitals:  Vitals Value Taken Time   /77 06/27/24 1600   Temp 36.6  C (97.8  F) 06/27/24 1215   Pulse 64 06/27/24 1600   Resp 21 06/27/24 1600   SpO2 94 % 06/27/24 1600       Electronically Signed By: Joesph Castillo MD  June 27, 2024  5:16 PM

## 2024-06-28 ENCOUNTER — TRANSCRIBE ORDERS (OUTPATIENT)
Dept: OTHER | Age: 74
End: 2024-06-28

## 2024-06-28 DIAGNOSIS — Z96.612 S/P REVERSE TOTAL SHOULDER ARTHROPLASTY, LEFT: Primary | ICD-10-CM

## 2024-06-28 LAB
PATH REPORT.COMMENTS IMP SPEC: NORMAL
PATH REPORT.COMMENTS IMP SPEC: NORMAL
PATH REPORT.FINAL DX SPEC: NORMAL
PATH REPORT.GROSS SPEC: NORMAL
PATH REPORT.MICROSCOPIC SPEC OTHER STN: NORMAL
PATH REPORT.RELEVANT HX SPEC: NORMAL
PHOTO IMAGE: NORMAL

## 2024-06-28 PROCEDURE — 88305 TISSUE EXAM BY PATHOLOGIST: CPT | Mod: 26 | Performed by: PATHOLOGY

## 2024-06-28 PROCEDURE — 88342 IMHCHEM/IMCYTCHM 1ST ANTB: CPT | Mod: 26 | Performed by: PATHOLOGY

## 2024-07-11 ENCOUNTER — LAB REQUISITION (OUTPATIENT)
Dept: LAB | Facility: CLINIC | Age: 74
End: 2024-07-11
Payer: COMMERCIAL

## 2024-07-11 DIAGNOSIS — E87.6 HYPOKALEMIA: ICD-10-CM

## 2024-07-11 DIAGNOSIS — E83.42 HYPOMAGNESEMIA: ICD-10-CM

## 2024-07-11 LAB
MAGNESIUM SERPL-MCNC: 1.4 MG/DL (ref 1.7–2.3)
POTASSIUM SERPL-SCNC: 3.9 MMOL/L (ref 3.4–5.3)

## 2024-07-11 PROCEDURE — 83735 ASSAY OF MAGNESIUM: CPT | Mod: ORL | Performed by: FAMILY MEDICINE

## 2024-07-11 PROCEDURE — 84132 ASSAY OF SERUM POTASSIUM: CPT | Mod: ORL | Performed by: FAMILY MEDICINE

## 2024-07-12 ENCOUNTER — OFFICE VISIT (OUTPATIENT)
Dept: SURGERY | Facility: CLINIC | Age: 74
End: 2024-07-12
Payer: COMMERCIAL

## 2024-07-12 ENCOUNTER — TELEPHONE (OUTPATIENT)
Dept: SURGERY | Facility: CLINIC | Age: 74
End: 2024-07-12

## 2024-07-12 VITALS — WEIGHT: 138.2 LBS | DIASTOLIC BLOOD PRESSURE: 78 MMHG | SYSTOLIC BLOOD PRESSURE: 132 MMHG | BODY MASS INDEX: 26.11 KG/M2

## 2024-07-12 DIAGNOSIS — K31.7 GASTRIC POLYPS: Primary | ICD-10-CM

## 2024-07-12 PROCEDURE — 99214 OFFICE O/P EST MOD 30 MIN: CPT | Performed by: SURGERY

## 2024-07-12 NOTE — PROGRESS NOTES
For GENERAL SURGICAL CONSULTATION    I was requested by Misha Alonzo to consult on this pt to evaluate them for Gastric Polyps    HPI:  This is a 74 year old female here today with weight loss and decreased appetite.  It was noted on the CT scan that she had some thickening of her stomach.  I evaluated her with an upper endoscopy and found a region of significant polyp formation on her stomach.  I biopsied these but they came back as benign fundic glands.  The biopsies were also negative for H. pylori.      Surgical Pathology Report                         Case: [id number]                                   Authorizing Provider:  Josue Love MD Collected:           06/27/2024 11:17 AM           Ordering Location:     [facility]      Received:            06/27/2024 11:44 AM                                  [facility]                                                               Pathologist:           Makeda Briones MD                                                           Specimen:    Stomach, Gasric Polyps                                                                    Final Diagnosis   GASTRIC POLYPS, BIOPSIES AND POLYPECTOMY:        1.  BENIGN FUNDIC GLAND POLYPS        2.  NO EVIDENCE OF ACTIVE GASTRITIS, ATROPHY, DYSPLASIA OR MALIGNANCY        3.  H. PYLORI IMMUNOSTAIN: NEGATIVE FOR HELICOBACTER ORGANISMS       Allergies:Amlodipine, Codeine, and Penicillins    Past Medical History:   Diagnosis Date    Depression     Gastric wall thickening     GERD (gastroesophageal reflux disease)     Hyperlipidemia     Hypertension     Hypothyroidism     h/o grave's disease    Leiomyoma of uterus     PONV (postoperative nausea and vomiting)     Seasonal allergies     Ventral hernia        Past Surgical History:   Procedure Laterality Date    APPENDECTOMY      CHOLECYSTECTOMY      COLON SURGERY  2009    partial colon resection    ESOPHAGOSCOPY, GASTROSCOPY, DUODENOSCOPY (EGD), COMBINED N/A  6/27/2024    Procedure: ESOPHAGOGASTRODUODENOSCOPY, WITH BIOPSY;  Surgeon: Josue Love MD;  Location: [facility] [place]    EYE SURGERY      for proptosis    HEMIARTHROPLASTY SHOULDER FRACTURE Right     HERNIA REPAIR      HERNIORRHAPHY VENTRAL N/A 3/13/2018    Procedure: VENTRAL HERNIA REPAIR;  Surgeon: Josue Love MD;  Location: [facility] [place];  Service:     HUMERUS FRACTURE SURGERY      HYSTERECTOMY Bilateral 3/13/2018    Procedure: EXPLORATORY LAPAROTOMY TOTAL ABDOMINAL HYSTERECTOMY, BILATERAL SALPINGO OOPHORECTOMY PELVIC WASHINGS LYSIS OF ADHESIONS;  Surgeon: Sheri Zamarripa MD;  Location: [facility] [place];  Service:     REVERSE ARTHROPLASTY SHOULDER Left 6/10/2024    Procedure: Reverse total shoulder arthroplasty;  Surgeon: Chago Iqbal MD;  Location: [facility]    THYROID SURGERY      ablation    TUBAL LIGATION         CURRENT MEDS:  Current Outpatient Medications   Medication Sig Dispense Refill    aspirin 81 MG EC tablet Take 1 tablet (81 mg) by mouth 2 times daily 60 tablet 0    betamethasone valerate (VALISONE) 0.1 % cream [BETAMETHASONE VALERATE (VALISONE) 0.1 % CREAM] Apply 1 application topically 2 (two) times a day as needed.       calcium carbonate (OS-VADIM) 600 mg calcium (1,500 mg) tablet [CALCIUM CARBONATE (OS-VADIM) 600 MG CALCIUM (1,500 MG) TABLET] Take 600 mg by mouth daily.       cholecalciferol, vitamin D3, 1,000 unit tablet [CHOLECALCIFEROL, VITAMIN D3, 1,000 UNIT TABLET] Take 1,000 Units by mouth daily.       Flaxseed, Linseed, (FLAX SEED OIL) 1000 MG capsule Take 1 capsule by mouth daily      hydrOXYzine HCl (ATARAX) 10 MG tablet Take 1 tablet (10 mg) by mouth every 6 hours as needed for itching or anxiety (with pain, moderate pain) 30 tablet 0    levothyroxine (SYNTHROID/LEVOTHROID) 100 MCG tablet Take 100 mcg by mouth daily      lisinopril-hydrochlorothiazide (ZESTORETIC) 20-12.5 MG tablet Take 1 tablet by mouth every evening      naproxen sodium (ALEVE) 220 MG  tablet Take 220 mg by mouth daily as needed for moderate pain      omeprazole (PRILOSEC) 40 MG capsule [OMEPRAZOLE (PRILOSEC) 40 MG CAPSULE] Take 40 mg by mouth daily.       ondansetron (ZOFRAN ODT) 4 MG ODT tab Take 1 tablet (4 mg) by mouth every 6 hours as needed for nausea or vomiting 30 tablet 0    pravastatin (PRAVACHOL) 40 MG tablet [PRAVASTATIN (PRAVACHOL) 40 MG TABLET] Take 40 mg by mouth at bedtime.          Family History   Problem Relation Age of Onset    Cancer Father 76.00        brain    Breast Cancer No family hx of      Family history is not pertinent to this patients Chief Complaint.     reports that she quit smoking about 24 years ago. Her smoking use included cigarettes. She has never used smokeless tobacco. She reports current alcohol use. She reports that she does not use drugs.    Review of Systems -   10 point Review of systems is negative except for; as mentioned above in HPI and PMHx    /78 (BP Location: Right arm, Patient Position: Sitting, Cuff Size: Adult Regular)   Wt 62.7 kg (138 lb 3.2 oz)   BMI 26.11 kg/m    Body mass index is 26.11 kg/m .    EXAM:  GENERAL: Well developed female  HEENT: EOMI, Anicteric Sclera, Moist Mucous Membranes,  In Mouth the pt does not have redness or bleeding gums  CARDIOVASCULAR: RRR w/out murmur   CHEST/LUNG: Clear to Auscultation  ABDOMEN:  Non tender to palpation, +BS  MUSCULOSKELETAL:  No deformities with good range of motion in all extremities  NEURO: She is ambulatory with good strength in both legs.  HEME/LYMPH: No Cervical Adenopathy or tenderness.     IMAGES:  EXAM: CT ABDOMEN PELVIS W CONTRAST  LOCATION: [facility]  DATE: 5/23/2024     INDICATION: tachypnea, fall, leukcytosis, transient hypotension, lactic acid 3.5, diarrhea  COMPARISON: CT 5/22/2024 and CT 2/15/2018.  TECHNIQUE: CT scan of the abdomen and pelvis was performed following injection of IV contrast. Multiplanar reformats were obtained. Dose reduction  techniques were used.  CONTRAST: IsoVue 370 75mL     FINDINGS:   LOWER CHEST: Normal.     HEPATOBILIARY: Normal.     PANCREAS: Normal.     SPLEEN: Normal.     ADRENAL GLANDS: Nodularity in the adrenal glands more prominent on the right unchanged since 2018 can be considered benign adrenal adenomas. No follow-up needed for these.     KIDNEYS/BLADDER: Normal.     BOWEL: Generalized thickening of the stomach similar to previous suggestive of gastritis. Postop change right side of the colon. Moderate diverticulosis in the colon but nothing for acute inflammatory change.     LYMPH NODES: Normal.     VASCULATURE: No aneurysms.     PELVIC ORGANS: Normal.     MUSCULOSKELETAL: Bilateral total hip arthroplasties. No concerning bone lesion.                                                                      IMPRESSION:   1.  Generalized thickening of the stomach similar to recent CT from yesterday. Possibly from some generalized gastritis.     2.  No significant new finding.    Assessment/Plan:  Mrs. Martino, has been experiencing weight loss she was noted on CT scan to have thickening of her stomach.  I evaluated her stomach with an upper endoscopy and noted an atypical region of polyps within the mucosa of 1 part of the stomach.  I did biopsy this and it came back as benign fundic gland.  I still have some concerns that we could be missing something deeper within the wall of the stomach.  I would like this to be evaluated by a gastroenterology colleague who has expertise in endoscopic ultrasound to be certain there is not something happening deeper in the stomach leading to these symptoms and findings.      Josue Love MD  [facility]  [phone]

## 2024-07-12 NOTE — TELEPHONE ENCOUNTER
Referral placed to [facility] for EUS on [facility] online portal.

## 2024-07-12 NOTE — LETTER
7/12/2024      Mary Martino  [address]  [place] [address]      Dear Colleague,    Thank you for referring your patient, Mary Martino, to the [facility] [place]. Please see a copy of my visit note below.    For GENERAL SURGICAL CONSULTATION    I was requested by Misha Alonzo to consult on this pt to evaluate them for Gastric Polyps    HPI:  This is a 74 year old female here today with weight loss and decreased appetite.  It was noted on the CT scan that she had some thickening of her stomach.  I evaluated her with an upper endoscopy and found a region of significant polyp formation on her stomach.  I biopsied these but they came back as benign fundic glands.  The biopsies were also negative for H. pylori.      Surgical Pathology Report                         Case: [id number]                                   Authorizing Provider:  Josue Love MD Collected:           06/27/2024 11:17 AM           Ordering Location:     [facility]      Received:            06/27/2024 11:44 AM                                  [facility]                                                               Pathologist:           Makeda Briones MD                                                           Specimen:    Stomach, Gasric Polyps                                                                    Final Diagnosis   GASTRIC POLYPS, BIOPSIES AND POLYPECTOMY:        1.  BENIGN FUNDIC GLAND POLYPS        2.  NO EVIDENCE OF ACTIVE GASTRITIS, ATROPHY, DYSPLASIA OR MALIGNANCY        3.  H. PYLORI IMMUNOSTAIN: NEGATIVE FOR HELICOBACTER ORGANISMS       Allergies:Amlodipine, Codeine, and Penicillins    Past Medical History:   Diagnosis Date     Depression      Gastric wall thickening      GERD (gastroesophageal reflux disease)      Hyperlipidemia      Hypertension      Hypothyroidism     h/o grave's disease     Leiomyoma of uterus      PONV (postoperative  nausea and vomiting)      Seasonal allergies      Ventral hernia        Past Surgical History:   Procedure Laterality Date     APPENDECTOMY       CHOLECYSTECTOMY       COLON SURGERY  2009    partial colon resection     ESOPHAGOSCOPY, GASTROSCOPY, DUODENOSCOPY (EGD), COMBINED N/A 6/27/2024    Procedure: ESOPHAGOGASTRODUODENOSCOPY, WITH BIOPSY;  Surgeon: Josue Love MD;  Location: [facility] [place]     EYE SURGERY      for proptosis     HEMIARTHROPLASTY SHOULDER FRACTURE Right      HERNIA REPAIR       HERNIORRHAPHY VENTRAL N/A 3/13/2018    Procedure: VENTRAL HERNIA REPAIR;  Surgeon: Josue Love MD;  Location: [facility] [place];  Service:      HUMERUS FRACTURE SURGERY       HYSTERECTOMY Bilateral 3/13/2018    Procedure: EXPLORATORY LAPAROTOMY TOTAL ABDOMINAL HYSTERECTOMY, BILATERAL SALPINGO OOPHORECTOMY PELVIC WASHINGS LYSIS OF ADHESIONS;  Surgeon: Sheri Zamarripa MD;  Location: [facility] [place];  Service:      REVERSE ARTHROPLASTY SHOULDER Left 6/10/2024    Procedure: Reverse total shoulder arthroplasty;  Surgeon: Chago Iqbal MD;  Location: [facility]     THYROID SURGERY      ablation     TUBAL LIGATION         CURRENT MEDS:  Current Outpatient Medications   Medication Sig Dispense Refill     aspirin 81 MG EC tablet Take 1 tablet (81 mg) by mouth 2 times daily 60 tablet 0     betamethasone valerate (VALISONE) 0.1 % cream [BETAMETHASONE VALERATE (VALISONE) 0.1 % CREAM] Apply 1 application topically 2 (two) times a day as needed.        calcium carbonate (OS-VADIM) 600 mg calcium (1,500 mg) tablet [CALCIUM CARBONATE (OS-VADIM) 600 MG CALCIUM (1,500 MG) TABLET] Take 600 mg by mouth daily.        cholecalciferol, vitamin D3, 1,000 unit tablet [CHOLECALCIFEROL, VITAMIN D3, 1,000 UNIT TABLET] Take 1,000 Units by mouth daily.        Flaxseed, Linseed, (FLAX SEED OIL) 1000 MG capsule Take 1 capsule by mouth daily       hydrOXYzine HCl (ATARAX) 10 MG tablet Take 1 tablet (10 mg) by mouth every 6 hours as  needed for itching or anxiety (with pain, moderate pain) 30 tablet 0     levothyroxine (SYNTHROID/LEVOTHROID) 100 MCG tablet Take 100 mcg by mouth daily       lisinopril-hydrochlorothiazide (ZESTORETIC) 20-12.5 MG tablet Take 1 tablet by mouth every evening       naproxen sodium (ALEVE) 220 MG tablet Take 220 mg by mouth daily as needed for moderate pain       omeprazole (PRILOSEC) 40 MG capsule [OMEPRAZOLE (PRILOSEC) 40 MG CAPSULE] Take 40 mg by mouth daily.        ondansetron (ZOFRAN ODT) 4 MG ODT tab Take 1 tablet (4 mg) by mouth every 6 hours as needed for nausea or vomiting 30 tablet 0     pravastatin (PRAVACHOL) 40 MG tablet [PRAVASTATIN (PRAVACHOL) 40 MG TABLET] Take 40 mg by mouth at bedtime.          Family History   Problem Relation Age of Onset     Cancer Father 76.00        brain     Breast Cancer No family hx of      Family history is not pertinent to this patients Chief Complaint.     reports that she quit smoking about 24 years ago. Her smoking use included cigarettes. She has never used smokeless tobacco. She reports current alcohol use. She reports that she does not use drugs.    Review of Systems -   10 point Review of systems is negative except for; as mentioned above in HPI and PMHx    /78 (BP Location: Right arm, Patient Position: Sitting, Cuff Size: Adult Regular)   Wt 62.7 kg (138 lb 3.2 oz)   BMI 26.11 kg/m    Body mass index is 26.11 kg/m .    EXAM:  GENERAL: Well developed female  HEENT: EOMI, Anicteric Sclera, Moist Mucous Membranes,  In Mouth the pt does not have redness or bleeding gums  CARDIOVASCULAR: RRR w/out murmur   CHEST/LUNG: Clear to Auscultation  ABDOMEN:  Non tender to palpation, +BS  MUSCULOSKELETAL:  No deformities with good range of motion in all extremities  NEURO: She is ambulatory with good strength in both legs.  HEME/LYMPH: No Cervical Adenopathy or tenderness.     IMAGES:  EXAM: CT ABDOMEN PELVIS W CONTRAST  LOCATION: [facility]  DATE:  5/23/2024     INDICATION: tachypnea, fall, leukcytosis, transient hypotension, lactic acid 3.5, diarrhea  COMPARISON: CT 5/22/2024 and CT 2/15/2018.  TECHNIQUE: CT scan of the abdomen and pelvis was performed following injection of IV contrast. Multiplanar reformats were obtained. Dose reduction techniques were used.  CONTRAST: IsoVue 370 75mL     FINDINGS:   LOWER CHEST: Normal.     HEPATOBILIARY: Normal.     PANCREAS: Normal.     SPLEEN: Normal.     ADRENAL GLANDS: Nodularity in the adrenal glands more prominent on the right unchanged since 2018 can be considered benign adrenal adenomas. No follow-up needed for these.     KIDNEYS/BLADDER: Normal.     BOWEL: Generalized thickening of the stomach similar to previous suggestive of gastritis. Postop change right side of the colon. Moderate diverticulosis in the colon but nothing for acute inflammatory change.     LYMPH NODES: Normal.     VASCULATURE: No aneurysms.     PELVIC ORGANS: Normal.     MUSCULOSKELETAL: Bilateral total hip arthroplasties. No concerning bone lesion.                                                                      IMPRESSION:   1.  Generalized thickening of the stomach similar to recent CT from yesterday. Possibly from some generalized gastritis.     2.  No significant new finding.    Assessment/Plan:  Mrs. Martino, has been experiencing weight loss she was noted on CT scan to have thickening of her stomach.  I evaluated her stomach with an upper endoscopy and noted an atypical region of polyps within the mucosa of 1 part of the stomach.  I did biopsy this and it came back as benign fundic gland.  I still have some concerns that we could be missing something deeper within the wall of the stomach.  I would like this to be evaluated by a gastroenterology colleague who has expertise in endoscopic ultrasound to be certain there is not something happening deeper in the stomach leading to these symptoms and findings.      Josue Love,  MD  [facility]  [phone]      Again, thank you for allowing me to participate in the care of your patient.        Sincerely,        Josue Love MD

## 2024-07-16 ENCOUNTER — THERAPY VISIT (OUTPATIENT)
Dept: PHYSICAL THERAPY | Facility: CLINIC | Age: 74
End: 2024-07-16
Payer: COMMERCIAL

## 2024-07-16 ENCOUNTER — HOSPITAL ENCOUNTER (OUTPATIENT)
Facility: HOSPITAL | Age: 74
End: 2024-07-16
Attending: INTERNAL MEDICINE | Admitting: INTERNAL MEDICINE
Payer: COMMERCIAL

## 2024-07-16 DIAGNOSIS — M25.512 LEFT SHOULDER PAIN: Primary | ICD-10-CM

## 2024-07-16 PROCEDURE — 97161 PT EVAL LOW COMPLEX 20 MIN: CPT | Mod: GP | Performed by: PHYSICAL THERAPIST

## 2024-07-16 PROCEDURE — 97110 THERAPEUTIC EXERCISES: CPT | Mod: GP | Performed by: PHYSICAL THERAPIST

## 2024-07-16 ASSESSMENT — ACTIVITIES OF DAILY LIVING (ADL)
PLEASE_INDICATE_YOR_PRIMARY_REASON_FOR_REFERRAL_TO_THERAPY:: SHOULDER
AT_ITS_WORST?: 1

## 2024-07-16 NOTE — PROGRESS NOTES
PHYSICAL THERAPY EVALUATION  Type of Visit: Evaluation       Fall Risk Screen:  Have you fallen 2 or more times in the past year?: No  Have you fallen and had an injury in the past year?: Yes  Timed Up and Go score (seconds): 12  Is patient a fall risk?: No    Subjective       Presenting condition or subjective complaint: broken arm  Fracture L humerus 5/24. S/p rTSA L  6/10/24.  Starting to wean out of sling. Minimal pain.   Follow up end of July w/ surgeon.   Date of onset: 06/10/24    Relevant medical history:     Dates & types of surgery: Christy 10 2024  H/o R carol arthroplasty shoulder  B JAHAIRA    Prior diagnostic imaging/testing results: MRI; X-ray     Prior therapy history for the same diagnosis, illness or injury:        Prior Level of Function  Transfers:   Ambulation:   ADL:   IADL:     Living Environment  Social support: With a significant other or spouse   Type of home: House; 1 level   Stairs to enter the home:         Ramp: No   Stairs inside the home: Yes 13 Is there a railing: Yes     Help at home: None  Equipment owned: Raised toilet seat     Employment: No    Hobbies/Interests:      Patient goals for therapy: lift arm    Pain assessment: See objective evaluation for additional pain details     Objective   SHOULDER EVALUATION  PAIN: Pain Location: shoulder  INTEGUMENTARY (edema, incisions):   POSTURE: Sitting Posture: Rounded shoulders, Forward head  GAIT:   Weightbearing Status:   Assistive Device(s):   Gait Deviations:   BALANCE/PROPRIOCEPTION:   WEIGHTBEARING ALIGNMENT:   ROM:   PROM   Flexion 85  Abduction 40  ER neutral  IR to midline    STRENGTH: NT post surgical  FLEXIBILITY: decreased pec  SPECIAL TESTS: NT  PALPATION:   JOINT MOBILITY:   CERVICAL SCREEN:  WFL    Assessment & Plan   CLINICAL IMPRESSIONS  Medical Diagnosis: s/p Left Reverse Total Shoulder 6/10/24    Treatment Diagnosis: decreaesed strength and ROM L shoulder   Impression/Assessment: Patient is a 74 year old female with left  shoulder pain complaints.  The following significant findings have been identified: Pain, Decreased ROM/flexibility, Decreased joint mobility, Decreased strength, Impaired muscle performance, Decreased activity tolerance, and Impaired posture. These impairments interfere with their ability to perform self care tasks, recreational activities, household chores, and driving  as compared to previous level of function.     Clinical Decision Making (Complexity):  Clinical Presentation: Stable/Uncomplicated  Clinical Presentation Rationale: based on medical and personal factors listed in PT evaluation  Clinical Decision Making (Complexity): Low complexity    PLAN OF CARE  Treatment Interventions:  Interventions: Manual Therapy, Neuromuscular Re-education, Therapeutic Activity, Therapeutic Exercise, Self-Care/Home Management    Long Term Goals     PT Goal 1  Goal Identifier: 1  Goal Description: Pt will be able to reach overhead to high shelf  Rationale: to maximize safety and independence with performance of ADLs and functional tasks;to maximize safety and independence with self cares;to maximize safety and independence within the home  Target Date: 10/08/24  PT Goal 2  Goal Identifier: 2  Goal Description: Pt will be able to lift 5# to counter height without increased pain  Rationale: to maximize safety and independence with performance of ADLs and functional tasks;to maximize safety and independence within the home;to maximize safety and independence with self cares  Target Date: 10/08/24      Frequency of Treatment: 1x/week  Duration of Treatment: 12 marycarmen    Recommended Referrals to Other Professionals:   Education Assessment:        Risks and benefits of evaluation/treatment have been explained.   Patient/Family/caregiver agrees with Plan of Care.     Evaluation Time:     PT Eval, Low Complexity Minutes (16466): 10       Signing Clinician: Natacha Moncada PT        [facility]                                                                                    OUTPATIENT PHYSICAL THERAPY      PLAN OF TREATMENT FOR OUTPATIENT REHABILITATION   Patient's Last Name, First Name, Mary Vivar YOB: 1950   Provider's Name   [facility]   Medical Record No.  [id number]     Onset Date: 06/10/24  Start of Care Date: 07/16/24     Medical Diagnosis:  s/p Left Reverse Total Shoulder 6/10/24      PT Treatment Diagnosis:  decreaesed strength and ROM L shoulder Plan of Treatment  Frequency/Duration: 1x/week/ 12 marycarmen    Certification date from 07/16/24 to 10/08/24         See note for plan of treatment details and functional goals     Natacha Moncada, PT                         I CERTIFY THE NEED FOR THESE SERVICES FURNISHED UNDER        THIS PLAN OF TREATMENT AND WHILE UNDER MY CARE .             Physician Signature               Date    X_____________________________________________________                  Referring Provider:  Lydia Patterson    Initial Assessment  See Epic Evaluation- Start of Care Date: 07/16/24

## 2024-07-17 ENCOUNTER — DOCUMENTATION ONLY (OUTPATIENT)
Dept: SURGERY | Facility: CLINIC | Age: 74
End: 2024-07-17
Payer: COMMERCIAL

## 2024-07-17 NOTE — PROGRESS NOTES
We scheduled the Endoscopic Ultrasound Upper order for Mary Martino. The appointment is with Mohamud Kruger MD at [facility] on 09/20/2024 at 11:30 AM.

## 2024-07-23 ENCOUNTER — THERAPY VISIT (OUTPATIENT)
Dept: PHYSICAL THERAPY | Facility: CLINIC | Age: 74
End: 2024-07-23
Payer: COMMERCIAL

## 2024-07-23 DIAGNOSIS — M25.512 LEFT SHOULDER PAIN: Primary | ICD-10-CM

## 2024-07-23 PROCEDURE — 97140 MANUAL THERAPY 1/> REGIONS: CPT | Mod: GP | Performed by: PHYSICAL THERAPIST

## 2024-07-23 PROCEDURE — 97110 THERAPEUTIC EXERCISES: CPT | Mod: GP | Performed by: PHYSICAL THERAPIST

## 2024-07-30 ENCOUNTER — THERAPY VISIT (OUTPATIENT)
Dept: PHYSICAL THERAPY | Facility: CLINIC | Age: 74
End: 2024-07-30
Payer: COMMERCIAL

## 2024-07-30 DIAGNOSIS — M25.512 LEFT SHOULDER PAIN: Primary | ICD-10-CM

## 2024-07-30 PROCEDURE — 97140 MANUAL THERAPY 1/> REGIONS: CPT | Mod: GP | Performed by: PHYSICAL THERAPIST

## 2024-07-30 PROCEDURE — 97110 THERAPEUTIC EXERCISES: CPT | Mod: GP | Performed by: PHYSICAL THERAPIST

## 2024-08-08 ENCOUNTER — LAB REQUISITION (OUTPATIENT)
Dept: LAB | Facility: CLINIC | Age: 74
End: 2024-08-08
Payer: COMMERCIAL

## 2024-08-08 DIAGNOSIS — N18.31 CHRONIC KIDNEY DISEASE, STAGE 3A (H): ICD-10-CM

## 2024-08-08 DIAGNOSIS — I12.9 HYPERTENSIVE CHRONIC KIDNEY DISEASE WITH STAGE 1 THROUGH STAGE 4 CHRONIC KIDNEY DISEASE, OR UNSPECIFIED CHRONIC KIDNEY DISEASE: ICD-10-CM

## 2024-08-08 DIAGNOSIS — Z01.818 ENCOUNTER FOR OTHER PREPROCEDURAL EXAMINATION: ICD-10-CM

## 2024-08-08 LAB
ANION GAP SERPL CALCULATED.3IONS-SCNC: 12 MMOL/L (ref 7–15)
BUN SERPL-MCNC: 31.8 MG/DL (ref 8–23)
CALCIUM SERPL-MCNC: 10 MG/DL (ref 8.8–10.4)
CHLORIDE SERPL-SCNC: 103 MMOL/L (ref 98–107)
CREAT SERPL-MCNC: 1.22 MG/DL (ref 0.51–0.95)
EGFRCR SERPLBLD CKD-EPI 2021: 46 ML/MIN/1.73M2
ERYTHROCYTE [DISTWIDTH] IN BLOOD BY AUTOMATED COUNT: 13.4 % (ref 10–15)
GLUCOSE SERPL-MCNC: 94 MG/DL (ref 70–99)
HCO3 SERPL-SCNC: 23 MMOL/L (ref 22–29)
HCT VFR BLD AUTO: 38.3 % (ref 35–47)
HGB BLD-MCNC: 12.2 G/DL (ref 11.7–15.7)
MCH RBC QN AUTO: 27.7 PG (ref 26.5–33)
MCHC RBC AUTO-ENTMCNC: 31.9 G/DL (ref 31.5–36.5)
MCV RBC AUTO: 87 FL (ref 78–100)
PLATELET # BLD AUTO: 271 10E3/UL (ref 150–450)
POTASSIUM SERPL-SCNC: 4.9 MMOL/L (ref 3.4–5.3)
RBC # BLD AUTO: 4.4 10E6/UL (ref 3.8–5.2)
SODIUM SERPL-SCNC: 138 MMOL/L (ref 135–145)
WBC # BLD AUTO: 5.9 10E3/UL (ref 4–11)

## 2024-08-08 PROCEDURE — 85027 COMPLETE CBC AUTOMATED: CPT | Mod: ORL | Performed by: FAMILY MEDICINE

## 2024-08-08 PROCEDURE — 80048 BASIC METABOLIC PNL TOTAL CA: CPT | Mod: ORL | Performed by: FAMILY MEDICINE

## 2024-08-20 ENCOUNTER — THERAPY VISIT (OUTPATIENT)
Dept: PHYSICAL THERAPY | Facility: CLINIC | Age: 74
End: 2024-08-20
Payer: COMMERCIAL

## 2024-08-20 DIAGNOSIS — M25.512 LEFT SHOULDER PAIN: Primary | ICD-10-CM

## 2024-08-20 PROCEDURE — 97140 MANUAL THERAPY 1/> REGIONS: CPT | Mod: GP | Performed by: PHYSICAL THERAPIST

## 2024-08-20 PROCEDURE — 97110 THERAPEUTIC EXERCISES: CPT | Mod: GP | Performed by: PHYSICAL THERAPIST

## 2024-08-20 NOTE — PROGRESS NOTES
08/20/24 0500   Appointment Info   Signing clinician's name / credentials Natacha Moncada PT DPT   Total/Authorized Visits 12   Visits Used 4   Medical Diagnosis s/p Left Reverse Total Shoulder 6/10/24   PT Tx Diagnosis decreaesed strength and ROM L shoulder   Other pertinent information 10 wks 8/19   Progress Note/Certification   Start of Care Date 07/16/24   Onset of illness/injury or Date of Surgery 06/10/24   Therapy Frequency 1x/week   Predicted Duration 12 marycarmen   Certification date from 07/16/24   Certification date to 10/08/24   Progress Note Completed Date 07/16/24   GOALS   PT Goals 2   PT Goal 1   Goal Identifier 1   Goal Description Pt will be able to reach overhead to high shelf   Rationale to maximize safety and independence with performance of ADLs and functional tasks;to maximize safety and independence with self cares;to maximize safety and independence within the home   Target Date 10/08/24   PT Goal 2   Goal Identifier 2   Goal Description Pt will be able to lift 5# to counter height without increased pain   Rationale to maximize safety and independence with performance of ADLs and functional tasks;to maximize safety and independence within the home;to maximize safety and independence with self cares   Target Date 10/08/24   Subjective Report   Subjective Report Shoulder feeling good. Still some difficulty reaching overhead/shouldere. But minimal pain.   Objective Measures   Objective Measures Objective Measure 1;Objective Measure 2;Objective Measure 3   Objective Measure 1   Objective Measure AROM   Details FLexion 90, abduction 80   Objective Measure 2   Objective Measure PROM   Details FLexion 115, abduction 90   Objective Measure 3   Objective Measure SPADI   Details 9 (initial 90)   Treatment Interventions (PT)   Interventions Therapeutic Procedure/Exercise;Manual Therapy   Therapeutic Procedure/Exercise   Therapeutic Procedures: strength, endurance, ROM, flexibility minutes (18196) 30    Ther Proc 1 PROM   Ther Proc 1 - Details per tolerance, flexion, scaption, IR/ER   Ther Proc 2 Bicep/Tricep   Ther Proc 2 - Details 1# weight, x 10 reps   Ther Proc 3 supine AROM   Ther Proc 3 - Details cueing elbow extetnesion   PTRx Ther Proc 1 Shoulder flexion and scaption   PTRx Ther Proc 1 - Details to 90* ROM   PTRx Ther Proc 2 Wall climbs   PTRx Ther Proc 2 - Details x 10   PTRx Ther Proc 3 AAROM   PTRx Ther Proc 3 - Details supine, focus on ROM   PTRx Ther Proc 4 scap retract   PTRx Ther Proc 4 - Details review, many sessions   PTRx Ther Proc 5 Shoulder extension   PTRx Ther Proc 5 - Details YTB   PTRx Ther Proc 6 Row   PTRx Ther Proc 6 - Details YTB   PTRx Ther Proc 7 Pulley Shoulder Flexion   PTRx Ther Proc 7 - Details seated x 2 minutes into flexion   Skilled Intervention improve mobility improve ROM, gentle strength   Patient Response/Progress demo understanding, good tolerance of ex   Manual Therapy   Manual Therapy: Mobilization, MFR, MLD, friction massage minutes (14635) 9   Manual Therapy 1 STM   Manual Therapy 1 - Details gentle STM through proximal humerus   Manual Therapy 2 scar mobility   Manual Therapy 2 - Details gentle 3 vector scar mobility anterior incision   Skilled Intervention improve mobilty, decreasea pain   Patient Response/Progress good tolerance, slow gains ROM   Plan   Home program printed ptrx   Updates to plan of care hold chart, dc if no recheck needed   Total Session Time   Timed Code Treatment Minutes 39   Total Treatment Time (sum of timed and untimed services) 39       DISCHARGE  Reason for Discharge: Patient chooses to discontinue therapy.    Equipment Issued: na     Discharge Plan: Patient to continue home program.    Referring Provider:  Lydia Patterson

## 2024-08-31 ENCOUNTER — HEALTH MAINTENANCE LETTER (OUTPATIENT)
Age: 74
End: 2024-08-31

## 2024-10-08 PROBLEM — M25.512 LEFT SHOULDER PAIN: Status: RESOLVED | Noted: 2024-07-16 | Resolved: 2024-10-08

## 2024-10-22 ENCOUNTER — ANCILLARY PROCEDURE (OUTPATIENT)
Dept: MAMMOGRAPHY | Facility: CLINIC | Age: 74
End: 2024-10-22
Attending: FAMILY MEDICINE
Payer: COMMERCIAL

## 2024-10-22 DIAGNOSIS — Z12.31 VISIT FOR SCREENING MAMMOGRAM: ICD-10-CM

## 2024-10-22 PROCEDURE — 77063 BREAST TOMOSYNTHESIS BI: CPT

## 2025-03-03 ENCOUNTER — LAB REQUISITION (OUTPATIENT)
Dept: LAB | Facility: CLINIC | Age: 75
End: 2025-03-03
Payer: COMMERCIAL

## 2025-03-03 DIAGNOSIS — E83.42 HYPOMAGNESEMIA: ICD-10-CM

## 2025-03-03 DIAGNOSIS — E78.5 HYPERLIPIDEMIA, UNSPECIFIED: ICD-10-CM

## 2025-03-03 DIAGNOSIS — I12.9 HYPERTENSIVE CHRONIC KIDNEY DISEASE WITH STAGE 1 THROUGH STAGE 4 CHRONIC KIDNEY DISEASE, OR UNSPECIFIED CHRONIC KIDNEY DISEASE: ICD-10-CM

## 2025-03-03 DIAGNOSIS — E03.9 HYPOTHYROIDISM, UNSPECIFIED: ICD-10-CM

## 2025-03-03 LAB
ALBUMIN SERPL BCG-MCNC: 4.1 G/DL (ref 3.5–5.2)
ALP SERPL-CCNC: 94 U/L (ref 40–150)
ALT SERPL W P-5'-P-CCNC: 22 U/L (ref 0–50)
ANION GAP SERPL CALCULATED.3IONS-SCNC: 10 MMOL/L (ref 7–15)
AST SERPL W P-5'-P-CCNC: 24 U/L (ref 0–45)
BILIRUB SERPL-MCNC: 0.3 MG/DL
BUN SERPL-MCNC: 22.7 MG/DL (ref 8–23)
CALCIUM SERPL-MCNC: 9.8 MG/DL (ref 8.8–10.4)
CHLORIDE SERPL-SCNC: 105 MMOL/L (ref 98–107)
CHOLEST SERPL-MCNC: 174 MG/DL
CREAT SERPL-MCNC: 1.22 MG/DL (ref 0.51–0.95)
EGFRCR SERPLBLD CKD-EPI 2021: 46 ML/MIN/1.73M2
FASTING STATUS PATIENT QL REPORTED: NO
FASTING STATUS PATIENT QL REPORTED: NO
GLUCOSE SERPL-MCNC: 96 MG/DL (ref 70–99)
HCO3 SERPL-SCNC: 24 MMOL/L (ref 22–29)
HDLC SERPL-MCNC: 48 MG/DL
LDLC SERPL CALC-MCNC: 87 MG/DL
MAGNESIUM SERPL-MCNC: 1.8 MG/DL (ref 1.7–2.3)
NONHDLC SERPL-MCNC: 126 MG/DL
POTASSIUM SERPL-SCNC: 4.6 MMOL/L (ref 3.4–5.3)
PROT SERPL-MCNC: 6.6 G/DL (ref 6.4–8.3)
SODIUM SERPL-SCNC: 139 MMOL/L (ref 135–145)
TRIGL SERPL-MCNC: 194 MG/DL
TSH SERPL DL<=0.005 MIU/L-ACNC: 1.11 UIU/ML (ref 0.3–4.2)

## 2025-03-03 PROCEDURE — 80061 LIPID PANEL: CPT | Mod: ORL | Performed by: FAMILY MEDICINE

## 2025-03-03 PROCEDURE — 83735 ASSAY OF MAGNESIUM: CPT | Mod: ORL | Performed by: FAMILY MEDICINE

## 2025-03-03 PROCEDURE — 80053 COMPREHEN METABOLIC PANEL: CPT | Mod: ORL | Performed by: FAMILY MEDICINE

## 2025-03-03 PROCEDURE — 84443 ASSAY THYROID STIM HORMONE: CPT | Mod: ORL | Performed by: FAMILY MEDICINE

## 2025-08-29 ENCOUNTER — ANESTHESIA EVENT (OUTPATIENT)
Dept: SURGERY | Facility: CLINIC | Age: 75
DRG: 516 | End: 2025-08-29
Payer: COMMERCIAL

## 2025-08-29 ASSESSMENT — LIFESTYLE VARIABLES: TOBACCO_USE: 1

## 2025-09-02 ENCOUNTER — HOSPITAL ENCOUNTER (INPATIENT)
Facility: CLINIC | Age: 75
LOS: 1 days | Discharge: HOME | DRG: 516 | End: 2025-09-03
Attending: ORTHOPAEDIC SURGERY | Admitting: ORTHOPAEDIC SURGERY
Payer: COMMERCIAL

## 2025-09-02 ENCOUNTER — ANESTHESIA (OUTPATIENT)
Dept: SURGERY | Facility: CLINIC | Age: 75
DRG: 516 | End: 2025-09-02
Payer: COMMERCIAL

## 2025-09-02 ENCOUNTER — APPOINTMENT (OUTPATIENT)
Dept: GENERAL RADIOLOGY | Facility: CLINIC | Age: 75
DRG: 516 | End: 2025-09-02
Attending: ORTHOPAEDIC SURGERY
Payer: COMMERCIAL

## 2025-09-02 DIAGNOSIS — Z96.619 S/P REVERSE TOTAL SHOULDER ARTHROPLASTY, UNSPECIFIED LATERALITY: Primary | ICD-10-CM

## 2025-09-02 PROBLEM — Z96.611 HISTORY OF REVISION OF TOTAL REPLACEMENT OF RIGHT SHOULDER JOINT: Status: ACTIVE | Noted: 2025-09-02

## 2025-09-02 LAB — GLUCOSE BLDC GLUCOMTR-MCNC: 100 MG/DL (ref 70–99)

## 2025-09-02 PROCEDURE — 272N000001 HC OR GENERAL SUPPLY STERILE: Performed by: ORTHOPAEDIC SURGERY

## 2025-09-02 PROCEDURE — 999N000065 XR SHOULDER RIGHT PORT G/E 2 VIEWS: Mod: RT

## 2025-09-02 PROCEDURE — 258N000003 HC RX IP 258 OP 636: Performed by: NURSE ANESTHETIST, CERTIFIED REGISTERED

## 2025-09-02 PROCEDURE — 250N000009 HC RX 250: Performed by: ORTHOPAEDIC SURGERY

## 2025-09-02 PROCEDURE — C1776 JOINT DEVICE (IMPLANTABLE): HCPCS | Performed by: ORTHOPAEDIC SURGERY

## 2025-09-02 PROCEDURE — 999N000141 HC STATISTIC PRE-PROCEDURE NURSING ASSESSMENT: Performed by: ORTHOPAEDIC SURGERY

## 2025-09-02 PROCEDURE — 250N000009 HC RX 250: Performed by: NURSE ANESTHETIST, CERTIFIED REGISTERED

## 2025-09-02 PROCEDURE — 250N000011 HC RX IP 250 OP 636: Performed by: NURSE ANESTHETIST, CERTIFIED REGISTERED

## 2025-09-02 PROCEDURE — 250N000011 HC RX IP 250 OP 636: Performed by: ORTHOPAEDIC SURGERY

## 2025-09-02 PROCEDURE — 250N000013 HC RX MED GY IP 250 OP 250 PS 637

## 2025-09-02 PROCEDURE — 258N000003 HC RX IP 258 OP 636: Performed by: ORTHOPAEDIC SURGERY

## 2025-09-02 PROCEDURE — 710N000009 HC RECOVERY PHASE 1, LEVEL 1, PER MIN: Performed by: ORTHOPAEDIC SURGERY

## 2025-09-02 PROCEDURE — 360N000078 HC SURGERY LEVEL 5, PER MIN: Performed by: ORTHOPAEDIC SURGERY

## 2025-09-02 PROCEDURE — 120N000001 HC R&B MED SURG/OB

## 2025-09-02 PROCEDURE — 250N000013 HC RX MED GY IP 250 OP 250 PS 637: Performed by: NURSE ANESTHETIST, CERTIFIED REGISTERED

## 2025-09-02 PROCEDURE — 250N000013 HC RX MED GY IP 250 OP 250 PS 637: Performed by: ORTHOPAEDIC SURGERY

## 2025-09-02 PROCEDURE — 271N000001 HC OR GENERAL SUPPLY NON-STERILE: Performed by: ORTHOPAEDIC SURGERY

## 2025-09-02 PROCEDURE — 250N000011 HC RX IP 250 OP 636

## 2025-09-02 PROCEDURE — C1713 ANCHOR/SCREW BN/BN,TIS/BN: HCPCS | Performed by: ORTHOPAEDIC SURGERY

## 2025-09-02 PROCEDURE — 370N000017 HC ANESTHESIA TECHNICAL FEE, PER MIN: Performed by: ORTHOPAEDIC SURGERY

## 2025-09-02 PROCEDURE — 0RWJ0JZ REVISION OF SYNTHETIC SUBSTITUTE IN RIGHT SHOULDER JOINT, OPEN APPROACH: ICD-10-PCS | Performed by: ORTHOPAEDIC SURGERY

## 2025-09-02 DEVICE — IMPLANTABLE DEVICE: Type: IMPLANTABLE DEVICE | Site: SHOULDER | Status: FUNCTIONAL

## 2025-09-02 DEVICE — INSERT REVISION REVERSE +6/12 36MM: Type: IMPLANTABLE DEVICE | Site: SHOULDER | Status: FUNCTIONAL

## 2025-09-02 DEVICE — BASEPLATE STD 25MM: Type: IMPLANTABLE DEVICE | Site: SHOULDER | Status: FUNCTIONAL

## 2025-09-02 DEVICE — SCREW PERIPHERAL 22MM: Type: IMPLANTABLE DEVICE | Site: SHOULDER | Status: FUNCTIONAL

## 2025-09-02 DEVICE — TRAY REVERSE LOW OFFSET +0 DWF510: Type: IMPLANTABLE DEVICE | Site: SHOULDER | Status: FUNCTIONAL

## 2025-09-02 RX ORDER — HYDROXYZINE HYDROCHLORIDE 10 MG/1
10 TABLET, FILM COATED ORAL EVERY 6 HOURS PRN
Status: DISCONTINUED | OUTPATIENT
Start: 2025-09-02 | End: 2025-09-03 | Stop reason: HOSPADM

## 2025-09-02 RX ORDER — HYDROMORPHONE HCL IN WATER/PF 6 MG/30 ML
0.2 PATIENT CONTROLLED ANALGESIA SYRINGE INTRAVENOUS EVERY 4 HOURS PRN
Status: DISCONTINUED | OUTPATIENT
Start: 2025-09-02 | End: 2025-09-03 | Stop reason: HOSPADM

## 2025-09-02 RX ORDER — OXYCODONE HYDROCHLORIDE 5 MG/1
10 TABLET ORAL
Status: DISCONTINUED | OUTPATIENT
Start: 2025-09-02 | End: 2025-09-02

## 2025-09-02 RX ORDER — ONDANSETRON 4 MG/1
4 TABLET, ORALLY DISINTEGRATING ORAL EVERY 30 MIN PRN
Status: DISCONTINUED | OUTPATIENT
Start: 2025-09-02 | End: 2025-09-02

## 2025-09-02 RX ORDER — FENTANYL CITRATE 50 UG/ML
INJECTION, SOLUTION INTRAMUSCULAR; INTRAVENOUS PRN
Status: DISCONTINUED | OUTPATIENT
Start: 2025-09-02 | End: 2025-09-02

## 2025-09-02 RX ORDER — ASPIRIN 81 MG/1
81 TABLET ORAL EVERY EVENING
COMMUNITY

## 2025-09-02 RX ORDER — NALOXONE HYDROCHLORIDE 0.4 MG/ML
0.1 INJECTION, SOLUTION INTRAMUSCULAR; INTRAVENOUS; SUBCUTANEOUS
Status: DISCONTINUED | OUTPATIENT
Start: 2025-09-02 | End: 2025-09-02

## 2025-09-02 RX ORDER — SODIUM CHLORIDE, SODIUM LACTATE, POTASSIUM CHLORIDE, CALCIUM CHLORIDE 600; 310; 30; 20 MG/100ML; MG/100ML; MG/100ML; MG/100ML
INJECTION, SOLUTION INTRAVENOUS CONTINUOUS
Status: DISCONTINUED | OUTPATIENT
Start: 2025-09-02 | End: 2025-09-02 | Stop reason: HOSPADM

## 2025-09-02 RX ORDER — CEFAZOLIN SODIUM/WATER 2 G/20 ML
2 SYRINGE (ML) INTRAVENOUS
Status: COMPLETED | OUTPATIENT
Start: 2025-09-02 | End: 2025-09-02

## 2025-09-02 RX ORDER — PRAVASTATIN SODIUM 20 MG
40 TABLET ORAL AT BEDTIME
Status: DISCONTINUED | OUTPATIENT
Start: 2025-09-02 | End: 2025-09-03 | Stop reason: HOSPADM

## 2025-09-02 RX ORDER — ONDANSETRON 4 MG/1
4 TABLET, ORALLY DISINTEGRATING ORAL EVERY 30 MIN PRN
Status: DISCONTINUED | OUTPATIENT
Start: 2025-09-02 | End: 2025-09-02 | Stop reason: HOSPADM

## 2025-09-02 RX ORDER — LIDOCAINE HYDROCHLORIDE 20 MG/ML
INJECTION, SOLUTION INFILTRATION; PERINEURAL PRN
Status: DISCONTINUED | OUTPATIENT
Start: 2025-09-02 | End: 2025-09-02

## 2025-09-02 RX ORDER — ASPIRIN 81 MG/1
81 TABLET ORAL 2 TIMES DAILY
Qty: 60 TABLET | Refills: 0 | Status: SHIPPED | OUTPATIENT
Start: 2025-09-02

## 2025-09-02 RX ORDER — LISINOPRIL AND HYDROCHLOROTHIAZIDE 12.5; 2 MG/1; MG/1
1 TABLET ORAL EVERY EVENING
Status: DISCONTINUED | OUTPATIENT
Start: 2025-09-02 | End: 2025-09-03 | Stop reason: HOSPADM

## 2025-09-02 RX ORDER — HYDROXYZINE HYDROCHLORIDE 25 MG/1
25 TABLET, FILM COATED ORAL ONCE
Status: COMPLETED | OUTPATIENT
Start: 2025-09-02 | End: 2025-09-02

## 2025-09-02 RX ORDER — DEXAMETHASONE SODIUM PHOSPHATE 4 MG/ML
4 INJECTION, SOLUTION INTRA-ARTICULAR; INTRALESIONAL; INTRAMUSCULAR; INTRAVENOUS; SOFT TISSUE
Status: DISCONTINUED | OUTPATIENT
Start: 2025-09-02 | End: 2025-09-02

## 2025-09-02 RX ORDER — CEFAZOLIN SODIUM 1 G/3ML
1 INJECTION, POWDER, FOR SOLUTION INTRAMUSCULAR; INTRAVENOUS EVERY 8 HOURS
Status: COMPLETED | OUTPATIENT
Start: 2025-09-02 | End: 2025-09-03

## 2025-09-02 RX ORDER — GABAPENTIN 100 MG/1
100 CAPSULE ORAL
Status: COMPLETED | OUTPATIENT
Start: 2025-09-02 | End: 2025-09-02

## 2025-09-02 RX ORDER — ONDANSETRON 2 MG/ML
INJECTION INTRAMUSCULAR; INTRAVENOUS PRN
Status: DISCONTINUED | OUTPATIENT
Start: 2025-09-02 | End: 2025-09-02

## 2025-09-02 RX ORDER — ACETAMINOPHEN 325 MG/1
650 TABLET ORAL EVERY 4 HOURS PRN
Status: SHIPPED
Start: 2025-09-02

## 2025-09-02 RX ORDER — HYDROMORPHONE HCL IN WATER/PF 6 MG/30 ML
0.4 PATIENT CONTROLLED ANALGESIA SYRINGE INTRAVENOUS EVERY 5 MIN PRN
Refills: 0 | Status: DISCONTINUED | OUTPATIENT
Start: 2025-09-02 | End: 2025-09-02 | Stop reason: HOSPADM

## 2025-09-02 RX ORDER — TRANEXAMIC ACID 650 MG/1
1950 TABLET ORAL ONCE
Status: COMPLETED | OUTPATIENT
Start: 2025-09-02 | End: 2025-09-02

## 2025-09-02 RX ORDER — PROPOFOL 10 MG/ML
INJECTION, EMULSION INTRAVENOUS PRN
Status: DISCONTINUED | OUTPATIENT
Start: 2025-09-02 | End: 2025-09-02

## 2025-09-02 RX ORDER — METOPROLOL TARTRATE 1 MG/ML
1-2 INJECTION, SOLUTION INTRAVENOUS EVERY 5 MIN PRN
Status: DISCONTINUED | OUTPATIENT
Start: 2025-09-02 | End: 2025-09-02 | Stop reason: HOSPADM

## 2025-09-02 RX ORDER — PROCHLORPERAZINE MALEATE 5 MG/1
5 TABLET ORAL EVERY 6 HOURS PRN
Status: DISCONTINUED | OUTPATIENT
Start: 2025-09-02 | End: 2025-09-03 | Stop reason: HOSPADM

## 2025-09-02 RX ORDER — ACETAMINOPHEN 325 MG/1
975 TABLET ORAL EVERY 8 HOURS
Status: DISCONTINUED | OUTPATIENT
Start: 2025-09-02 | End: 2025-09-03 | Stop reason: HOSPADM

## 2025-09-02 RX ORDER — OXYCODONE HYDROCHLORIDE 5 MG/1
5-10 TABLET ORAL EVERY 4 HOURS PRN
Qty: 30 TABLET | Refills: 0 | Status: SHIPPED | OUTPATIENT
Start: 2025-09-02

## 2025-09-02 RX ORDER — SODIUM CHLORIDE, SODIUM LACTATE, POTASSIUM CHLORIDE, CALCIUM CHLORIDE 600; 310; 30; 20 MG/100ML; MG/100ML; MG/100ML; MG/100ML
INJECTION, SOLUTION INTRAVENOUS CONTINUOUS
Status: DISCONTINUED | OUTPATIENT
Start: 2025-09-02 | End: 2025-09-03 | Stop reason: HOSPADM

## 2025-09-02 RX ORDER — NALOXONE HYDROCHLORIDE 0.4 MG/ML
0.1 INJECTION, SOLUTION INTRAMUSCULAR; INTRAVENOUS; SUBCUTANEOUS
Status: DISCONTINUED | OUTPATIENT
Start: 2025-09-02 | End: 2025-09-02 | Stop reason: HOSPADM

## 2025-09-02 RX ORDER — AMOXICILLIN 250 MG
1-2 CAPSULE ORAL 2 TIMES DAILY PRN
Qty: 15 TABLET | Refills: 0 | Status: SHIPPED | OUTPATIENT
Start: 2025-09-02

## 2025-09-02 RX ORDER — METHOCARBAMOL 500 MG/1
500 TABLET, FILM COATED ORAL 4 TIMES DAILY PRN
Status: DISCONTINUED | OUTPATIENT
Start: 2025-09-02 | End: 2025-09-03 | Stop reason: HOSPADM

## 2025-09-02 RX ORDER — ONDANSETRON 2 MG/ML
4 INJECTION INTRAMUSCULAR; INTRAVENOUS EVERY 6 HOURS PRN
Status: DISCONTINUED | OUTPATIENT
Start: 2025-09-02 | End: 2025-09-03 | Stop reason: HOSPADM

## 2025-09-02 RX ORDER — POLYETHYLENE GLYCOL 3350 17 G/17G
17 POWDER, FOR SOLUTION ORAL DAILY
Status: DISCONTINUED | OUTPATIENT
Start: 2025-09-03 | End: 2025-09-03 | Stop reason: HOSPADM

## 2025-09-02 RX ORDER — OXYCODONE HYDROCHLORIDE 5 MG/1
5 TABLET ORAL EVERY 4 HOURS PRN
Status: DISCONTINUED | OUTPATIENT
Start: 2025-09-02 | End: 2025-09-03

## 2025-09-02 RX ORDER — LEVOTHYROXINE SODIUM 100 UG/1
100 TABLET ORAL DAILY
Status: DISCONTINUED | OUTPATIENT
Start: 2025-09-02 | End: 2025-09-03 | Stop reason: HOSPADM

## 2025-09-02 RX ORDER — LIDOCAINE 40 MG/G
CREAM TOPICAL
Status: DISCONTINUED | OUTPATIENT
Start: 2025-09-02 | End: 2025-09-02 | Stop reason: HOSPADM

## 2025-09-02 RX ORDER — POLYETHYLENE GLYCOL 400 2.5 MG/ML
1 SOLUTION/ DROPS OPHTHALMIC 2 TIMES DAILY PRN
COMMUNITY

## 2025-09-02 RX ORDER — LIDOCAINE 40 MG/G
CREAM TOPICAL
Status: DISCONTINUED | OUTPATIENT
Start: 2025-09-06 | End: 2025-09-03 | Stop reason: HOSPADM

## 2025-09-02 RX ORDER — BISACODYL 10 MG
10 SUPPOSITORY, RECTAL RECTAL DAILY PRN
Status: DISCONTINUED | OUTPATIENT
Start: 2025-09-02 | End: 2025-09-03 | Stop reason: HOSPADM

## 2025-09-02 RX ORDER — CEFAZOLIN SODIUM/WATER 2 G/20 ML
2 SYRINGE (ML) INTRAVENOUS SEE ADMIN INSTRUCTIONS
Status: DISCONTINUED | OUTPATIENT
Start: 2025-09-02 | End: 2025-09-02 | Stop reason: HOSPADM

## 2025-09-02 RX ORDER — OMEPRAZOLE 20 MG/1
40 CAPSULE, DELAYED RELEASE ORAL DAILY
Status: DISCONTINUED | OUTPATIENT
Start: 2025-09-03 | End: 2025-09-02

## 2025-09-02 RX ORDER — AMOXICILLIN 250 MG
1 CAPSULE ORAL 2 TIMES DAILY
Status: DISCONTINUED | OUTPATIENT
Start: 2025-09-02 | End: 2025-09-03 | Stop reason: HOSPADM

## 2025-09-02 RX ORDER — HYDROMORPHONE HCL IN WATER/PF 6 MG/30 ML
0.1 PATIENT CONTROLLED ANALGESIA SYRINGE INTRAVENOUS EVERY 4 HOURS PRN
Status: DISCONTINUED | OUTPATIENT
Start: 2025-09-02 | End: 2025-09-03 | Stop reason: HOSPADM

## 2025-09-02 RX ORDER — ONDANSETRON 2 MG/ML
4 INJECTION INTRAMUSCULAR; INTRAVENOUS EVERY 30 MIN PRN
Status: DISCONTINUED | OUTPATIENT
Start: 2025-09-02 | End: 2025-09-02 | Stop reason: HOSPADM

## 2025-09-02 RX ORDER — PANTOPRAZOLE SODIUM 40 MG/1
40 TABLET, DELAYED RELEASE ORAL
Status: DISCONTINUED | OUTPATIENT
Start: 2025-09-03 | End: 2025-09-03 | Stop reason: HOSPADM

## 2025-09-02 RX ORDER — ACETAMINOPHEN 325 MG/1
975 TABLET ORAL ONCE
Status: COMPLETED | OUTPATIENT
Start: 2025-09-02 | End: 2025-09-02

## 2025-09-02 RX ORDER — ONDANSETRON 2 MG/ML
4 INJECTION INTRAMUSCULAR; INTRAVENOUS EVERY 30 MIN PRN
Status: DISCONTINUED | OUTPATIENT
Start: 2025-09-02 | End: 2025-09-02

## 2025-09-02 RX ORDER — DEXAMETHASONE SODIUM PHOSPHATE 4 MG/ML
4 INJECTION, SOLUTION INTRA-ARTICULAR; INTRALESIONAL; INTRAMUSCULAR; INTRAVENOUS; SOFT TISSUE
Status: DISCONTINUED | OUTPATIENT
Start: 2025-09-02 | End: 2025-09-02 | Stop reason: HOSPADM

## 2025-09-02 RX ORDER — OXYCODONE HYDROCHLORIDE 5 MG/1
5 TABLET ORAL
Status: DISCONTINUED | OUTPATIENT
Start: 2025-09-02 | End: 2025-09-02

## 2025-09-02 RX ORDER — BUPIVACAINE HCL/EPINEPHRINE 0.25-.0005
VIAL (ML) INJECTION
Status: COMPLETED | OUTPATIENT
Start: 2025-09-02 | End: 2025-09-02

## 2025-09-02 RX ORDER — MULTIVITAMIN WITH IRON
1 TABLET ORAL EVERY EVENING
COMMUNITY

## 2025-09-02 RX ORDER — PROPOFOL 10 MG/ML
INJECTION, EMULSION INTRAVENOUS CONTINUOUS PRN
Status: DISCONTINUED | OUTPATIENT
Start: 2025-09-02 | End: 2025-09-02

## 2025-09-02 RX ORDER — DEXAMETHASONE SODIUM PHOSPHATE 4 MG/ML
INJECTION, SOLUTION INTRA-ARTICULAR; INTRALESIONAL; INTRAMUSCULAR; INTRAVENOUS; SOFT TISSUE PRN
Status: DISCONTINUED | OUTPATIENT
Start: 2025-09-02 | End: 2025-09-02

## 2025-09-02 RX ORDER — HYDROMORPHONE HCL IN WATER/PF 6 MG/30 ML
0.2 PATIENT CONTROLLED ANALGESIA SYRINGE INTRAVENOUS EVERY 5 MIN PRN
Refills: 0 | Status: DISCONTINUED | OUTPATIENT
Start: 2025-09-02 | End: 2025-09-02 | Stop reason: HOSPADM

## 2025-09-02 RX ORDER — FENTANYL CITRATE 50 UG/ML
25 INJECTION, SOLUTION INTRAMUSCULAR; INTRAVENOUS
Status: DISCONTINUED | OUTPATIENT
Start: 2025-09-02 | End: 2025-09-02

## 2025-09-02 RX ORDER — ONDANSETRON 4 MG/1
4 TABLET, ORALLY DISINTEGRATING ORAL EVERY 6 HOURS PRN
Status: DISCONTINUED | OUTPATIENT
Start: 2025-09-02 | End: 2025-09-03 | Stop reason: HOSPADM

## 2025-09-02 RX ORDER — ASPIRIN 81 MG/1
81 TABLET ORAL 2 TIMES DAILY
Status: DISCONTINUED | OUTPATIENT
Start: 2025-09-02 | End: 2025-09-03 | Stop reason: HOSPADM

## 2025-09-02 RX ADMIN — SODIUM CHLORIDE, POTASSIUM CHLORIDE, SODIUM LACTATE AND CALCIUM CHLORIDE: 600; 310; 30; 20 INJECTION, SOLUTION INTRAVENOUS at 13:07

## 2025-09-02 RX ADMIN — PHENYLEPHRINE HYDROCHLORIDE 100 MCG: 10 INJECTION INTRAVENOUS at 11:33

## 2025-09-02 RX ADMIN — METHOCARBAMOL 500 MG: 500 TABLET ORAL at 14:59

## 2025-09-02 RX ADMIN — PHENYLEPHRINE HYDROCHLORIDE 0.5 MCG/KG/MIN: 10 INJECTION INTRAVENOUS at 11:27

## 2025-09-02 RX ADMIN — ACETAMINOPHEN 975 MG: 325 TABLET ORAL at 18:52

## 2025-09-02 RX ADMIN — PRAVASTATIN SODIUM 40 MG: 20 TABLET ORAL at 22:20

## 2025-09-02 RX ADMIN — FENTANYL CITRATE 50 MCG: 50 INJECTION INTRAMUSCULAR; INTRAVENOUS at 10:37

## 2025-09-02 RX ADMIN — Medication 50 MG: at 11:14

## 2025-09-02 RX ADMIN — SODIUM CHLORIDE, POTASSIUM CHLORIDE, SODIUM LACTATE AND CALCIUM CHLORIDE: 600; 310; 30; 20 INJECTION, SOLUTION INTRAVENOUS at 22:18

## 2025-09-02 RX ADMIN — PHENYLEPHRINE HYDROCHLORIDE 100 MCG: 10 INJECTION INTRAVENOUS at 11:27

## 2025-09-02 RX ADMIN — PROPOFOL 120 MG: 10 INJECTION, EMULSION INTRAVENOUS at 11:14

## 2025-09-02 RX ADMIN — LIDOCAINE HYDROCHLORIDE 60 MG: 20 INJECTION, SOLUTION INFILTRATION; PERINEURAL at 11:14

## 2025-09-02 RX ADMIN — BUPIVACAINE 10 ML: 13.3 INJECTION, SUSPENSION, LIPOSOMAL INFILTRATION at 10:45

## 2025-09-02 RX ADMIN — ACETAMINOPHEN 975 MG: 325 TABLET ORAL at 10:03

## 2025-09-02 RX ADMIN — PHENYLEPHRINE HYDROCHLORIDE 100 MCG: 10 INJECTION INTRAVENOUS at 11:30

## 2025-09-02 RX ADMIN — HYDROMORPHONE HYDROCHLORIDE 0.2 MG: 0.2 INJECTION, SOLUTION INTRAMUSCULAR; INTRAVENOUS; SUBCUTANEOUS at 13:05

## 2025-09-02 RX ADMIN — BUPIVACAINE HYDROCHLORIDE AND EPINEPHRINE BITARTRATE 10 ML: 2.5; .005 INJECTION, SOLUTION INFILTRATION; PERINEURAL at 10:45

## 2025-09-02 RX ADMIN — ASPIRIN 81 MG: 81 TABLET, DELAYED RELEASE ORAL at 22:20

## 2025-09-02 RX ADMIN — FENTANYL CITRATE 50 MCG: 50 INJECTION INTRAMUSCULAR; INTRAVENOUS at 10:46

## 2025-09-02 RX ADMIN — MIDAZOLAM 2 MG: 1 INJECTION INTRAMUSCULAR; INTRAVENOUS at 10:35

## 2025-09-02 RX ADMIN — GABAPENTIN 100 MG: 100 CAPSULE ORAL at 10:03

## 2025-09-02 RX ADMIN — DEXAMETHASONE SODIUM PHOSPHATE 4 MG: 4 INJECTION, SOLUTION INTRA-ARTICULAR; INTRALESIONAL; INTRAMUSCULAR; INTRAVENOUS; SOFT TISSUE at 11:26

## 2025-09-02 RX ADMIN — ONDANSETRON 4 MG: 2 INJECTION INTRAMUSCULAR; INTRAVENOUS at 11:14

## 2025-09-02 RX ADMIN — Medication 200 MG: at 12:40

## 2025-09-02 RX ADMIN — PROPOFOL 150 MCG/KG/MIN: 10 INJECTION, EMULSION INTRAVENOUS at 11:14

## 2025-09-02 RX ADMIN — HYDROXYZINE HYDROCHLORIDE 25 MG: 25 TABLET, FILM COATED ORAL at 15:00

## 2025-09-02 RX ADMIN — PHENYLEPHRINE HYDROCHLORIDE 100 MCG: 10 INJECTION INTRAVENOUS at 11:14

## 2025-09-02 RX ADMIN — HYDROXYZINE HYDROCHLORIDE 10 MG: 10 TABLET, FILM COATED ORAL at 13:16

## 2025-09-02 RX ADMIN — Medication 2 G: at 11:14

## 2025-09-02 RX ADMIN — CEFAZOLIN 1 G: 1 INJECTION, POWDER, FOR SOLUTION INTRAMUSCULAR; INTRAVENOUS at 18:52

## 2025-09-02 RX ADMIN — TRANEXAMIC ACID 1950 MG: 650 TABLET ORAL at 10:03

## 2025-09-02 RX ADMIN — PROPOFOL 50 MG: 10 INJECTION, EMULSION INTRAVENOUS at 11:21

## 2025-09-02 RX ADMIN — ONDANSETRON 4 MG: 2 INJECTION INTRAMUSCULAR; INTRAVENOUS at 13:20

## 2025-09-02 RX ADMIN — SODIUM CHLORIDE, POTASSIUM CHLORIDE, SODIUM LACTATE AND CALCIUM CHLORIDE: 600; 310; 30; 20 INJECTION, SOLUTION INTRAVENOUS at 10:26

## 2025-09-02 ASSESSMENT — ACTIVITIES OF DAILY LIVING (ADL)
ADLS_ACUITY_SCORE: 34
ADLS_ACUITY_SCORE: 35
ADLS_ACUITY_SCORE: 34
ADLS_ACUITY_SCORE: 35
ADLS_ACUITY_SCORE: 31
ADLS_ACUITY_SCORE: 34
ADLS_ACUITY_SCORE: 35
ADLS_ACUITY_SCORE: 34

## 2025-09-03 VITALS
WEIGHT: 138 LBS | RESPIRATION RATE: 16 BRPM | DIASTOLIC BLOOD PRESSURE: 73 MMHG | HEART RATE: 72 BPM | HEIGHT: 61 IN | BODY MASS INDEX: 26.06 KG/M2 | OXYGEN SATURATION: 91 % | SYSTOLIC BLOOD PRESSURE: 126 MMHG | TEMPERATURE: 98.1 F

## 2025-09-03 LAB
GLUCOSE SERPL-MCNC: 116 MG/DL (ref 70–99)
HGB BLD-MCNC: 10.3 G/DL (ref 11.7–15.7)
MCV RBC AUTO: 87 FL (ref 78–100)

## 2025-09-03 PROCEDURE — 97535 SELF CARE MNGMENT TRAINING: CPT | Mod: GO

## 2025-09-03 PROCEDURE — 97110 THERAPEUTIC EXERCISES: CPT | Mod: GO

## 2025-09-03 PROCEDURE — 97165 OT EVAL LOW COMPLEX 30 MIN: CPT | Mod: GO

## 2025-09-03 PROCEDURE — 85018 HEMOGLOBIN: CPT | Performed by: ORTHOPAEDIC SURGERY

## 2025-09-03 PROCEDURE — 250N000011 HC RX IP 250 OP 636: Performed by: ORTHOPAEDIC SURGERY

## 2025-09-03 PROCEDURE — 250N000013 HC RX MED GY IP 250 OP 250 PS 637: Performed by: ORTHOPAEDIC SURGERY

## 2025-09-03 PROCEDURE — 250N000013 HC RX MED GY IP 250 OP 250 PS 637

## 2025-09-03 PROCEDURE — 36415 COLL VENOUS BLD VENIPUNCTURE: CPT | Performed by: ORTHOPAEDIC SURGERY

## 2025-09-03 PROCEDURE — 82947 ASSAY GLUCOSE BLOOD QUANT: CPT | Performed by: ORTHOPAEDIC SURGERY

## 2025-09-03 RX ORDER — NALOXONE HYDROCHLORIDE 0.4 MG/ML
0.2 INJECTION, SOLUTION INTRAMUSCULAR; INTRAVENOUS; SUBCUTANEOUS
Status: DISCONTINUED | OUTPATIENT
Start: 2025-09-03 | End: 2025-09-03 | Stop reason: HOSPADM

## 2025-09-03 RX ORDER — OXYCODONE HYDROCHLORIDE 5 MG/1
10 TABLET ORAL EVERY 4 HOURS PRN
Refills: 0 | Status: DISCONTINUED | OUTPATIENT
Start: 2025-09-03 | End: 2025-09-03 | Stop reason: HOSPADM

## 2025-09-03 RX ORDER — NALOXONE HYDROCHLORIDE 0.4 MG/ML
0.4 INJECTION, SOLUTION INTRAMUSCULAR; INTRAVENOUS; SUBCUTANEOUS
Status: DISCONTINUED | OUTPATIENT
Start: 2025-09-03 | End: 2025-09-03 | Stop reason: HOSPADM

## 2025-09-03 RX ORDER — OXYCODONE HYDROCHLORIDE 5 MG/1
5 TABLET ORAL EVERY 4 HOURS PRN
Refills: 0 | Status: DISCONTINUED | OUTPATIENT
Start: 2025-09-03 | End: 2025-09-03 | Stop reason: HOSPADM

## 2025-09-03 RX ADMIN — LEVOTHYROXINE SODIUM 100 MCG: 0.1 TABLET ORAL at 05:31

## 2025-09-03 RX ADMIN — PANTOPRAZOLE SODIUM 40 MG: 40 TABLET, DELAYED RELEASE ORAL at 05:31

## 2025-09-03 RX ADMIN — ACETAMINOPHEN 975 MG: 325 TABLET ORAL at 09:29

## 2025-09-03 RX ADMIN — ASPIRIN 81 MG: 81 TABLET, DELAYED RELEASE ORAL at 07:41

## 2025-09-03 RX ADMIN — CEFAZOLIN 1 G: 1 INJECTION, POWDER, FOR SOLUTION INTRAMUSCULAR; INTRAVENOUS at 02:26

## 2025-09-03 RX ADMIN — ACETAMINOPHEN 975 MG: 325 TABLET ORAL at 02:23

## 2025-09-03 ASSESSMENT — ACTIVITIES OF DAILY LIVING (ADL)
ADLS_ACUITY_SCORE: 34
ADLS_ACUITY_SCORE: 33
ADLS_ACUITY_SCORE: 33
ADLS_ACUITY_SCORE: 34
ADLS_ACUITY_SCORE: 34
ADLS_ACUITY_SCORE: 33
ADLS_ACUITY_SCORE: 34
ADLS_ACUITY_SCORE: 33
ADLS_ACUITY_SCORE: 33

## (undated) DEVICE — DRILL BIT PERIPHERAL SCREW 3.2MM MWJ126

## (undated) DEVICE — DRAPE ARTHROSCOPY SHOULDER BEACHCHAIR 29369

## (undated) DEVICE — SUCTION MANIFOLD NEPTUNE 2 SYS 4 PORT 0702-020-000

## (undated) DEVICE — SLING ARM LG 0814-0064

## (undated) DEVICE — STOCKING SLEEVE COMPRESSION CALF MED

## (undated) DEVICE — SUCTION TIP POOLE K770

## (undated) DEVICE — SPONGE LAP 18X18" 1515

## (undated) DEVICE — SU STRATAFIX MONOCRYL 3-0 SPIRAL PS-2 30CM SXMP1B106

## (undated) DEVICE — GOWN IMPERVIOUS SPECIALTY XLG/XLONG 32474

## (undated) DEVICE — GLOVE BIOGEL PI MICRO SZ 8.0 48580

## (undated) DEVICE — TUBING SUCTION MEDI-VAC 1/4"X20' N620A

## (undated) DEVICE — DRAPE SHEET REV FOLD 3/4 9349

## (undated) DEVICE — GLOVE PROTEXIS BLUE W/NEU-THERA 8.0  2D73EB80

## (undated) DEVICE — PREP CHLORAPREP 26ML TINTED ORANGE  260815

## (undated) DEVICE — GLOVE PROTEXIS MICRO 6.5 LT BLUE 2D73PM65

## (undated) DEVICE — SOL WATER IRRIG 1000ML BOTTLE 07139-09

## (undated) DEVICE — SUCTION IRR SYSTEM W/TIP INTERPULSE

## (undated) DEVICE — BLADE SAW OSCIL/SAG STRK 11X90X1.19MM 4/2000 4111-119-090

## (undated) DEVICE — GLOVE BIOGEL PI MICRO SZ 6.5 48565

## (undated) DEVICE — PAD FLOOR SURGISAFE

## (undated) DEVICE — STRAP POSITIONING 60X31" BODY KNEE KBS 01

## (undated) DEVICE — DECANTER VIAL 2006S

## (undated) DEVICE — DRAPE IOBAN LG .375X23.5" 6648EZ

## (undated) DEVICE — GOWN LG DISP 9515

## (undated) DEVICE — SCREW BSPLT 35MM 6.5MM AQLS PRFRM GLND RVRS CNTR NS DWJ135: Type: IMPLANTABLE DEVICE | Site: SHOULDER | Status: NON-FUNCTIONAL

## (undated) DEVICE — SU ETHIBOND 0 CT-2 30" X412H

## (undated) DEVICE — ESU PENCIL SMOKE EVAC W/ROCKER SWITCH 0703-047-000

## (undated) DEVICE — SU ETHIBOND 5 V-37 4X30" MB66G

## (undated) DEVICE — SOL WATER IRRIG 1000ML BOTTLE 2F7114

## (undated) DEVICE — SOL NACL 0.9% IRRIG 3000ML BAG 07972-08

## (undated) DEVICE — GUIDEWIRE TORNIER AEQUALIS PERFORM +  2.5X220MM DWD017

## (undated) DEVICE — GLOVE PROTEXIS BLUE W/NEU-THERA 6.5  2D73EB65

## (undated) DEVICE — GLOVE BIOGEL PI MICRO INDICATOR UNDERGLOVE SZ 6.5 48965

## (undated) DEVICE — DRAPE BACK TABLE  44X90" 8377

## (undated) DEVICE — PACK SHOULDER

## (undated) DEVICE — GLOVE PROTEXIS MICRO 8.0 LT BLUE 2D73PM80

## (undated) DEVICE — SNARE OVAL SMALL DISP 100600

## (undated) DEVICE — BLADE SAW SAGITTAL STRK 18X90X1.37MM HD SYS 6 6118-137-090

## (undated) DEVICE — BLANKET BAIR HUGGER LOWER BODY 42568

## (undated) DEVICE — SU MONOCRYL 2-0 CT-1 36" UND Y945H

## (undated) DEVICE — IMM KIT SHOULDER TMAX MASK FACE 7210559

## (undated) DEVICE — BONE CLEANING TIP INTERPULSE  0210-010-000

## (undated) DEVICE — DRSG AQUACEL AG 3.5X12" HYDROFIBER 420670

## (undated) DEVICE — GLOVE BIOGEL PI MICRO INDICATOR UNDERGLOVE SZ 8.0 48980

## (undated) RX ORDER — TRANEXAMIC ACID 650 MG/1
TABLET ORAL
Status: DISPENSED
Start: 2025-09-02

## (undated) RX ORDER — BUPIVACAINE HYDROCHLORIDE 5 MG/ML
INJECTION, SOLUTION EPIDURAL; INTRACAUDAL; PERINEURAL
Status: DISPENSED
Start: 2025-09-02

## (undated) RX ORDER — DEXAMETHASONE SODIUM PHOSPHATE 10 MG/ML
INJECTION, SOLUTION INTRAMUSCULAR; INTRAVENOUS
Status: DISPENSED
Start: 2025-09-02

## (undated) RX ORDER — CEFAZOLIN SODIUM/WATER 2 G/20 ML
SYRINGE (ML) INTRAVENOUS
Status: DISPENSED
Start: 2025-09-02

## (undated) RX ORDER — DEXAMETHASONE SODIUM PHOSPHATE 10 MG/ML
INJECTION, SOLUTION INTRAMUSCULAR; INTRAVENOUS
Status: DISPENSED
Start: 2024-06-10

## (undated) RX ORDER — HYDROMORPHONE HYDROCHLORIDE 1 MG/ML
INJECTION, SOLUTION INTRAMUSCULAR; INTRAVENOUS; SUBCUTANEOUS
Status: DISPENSED
Start: 2025-09-02

## (undated) RX ORDER — ROPIVACAINE HYDROCHLORIDE 5 MG/ML
INJECTION, SOLUTION EPIDURAL; INFILTRATION; PERINEURAL
Status: DISPENSED
Start: 2024-06-10

## (undated) RX ORDER — GABAPENTIN 100 MG/1
CAPSULE ORAL
Status: DISPENSED
Start: 2025-09-02

## (undated) RX ORDER — BUPIVACAINE HYDROCHLORIDE 5 MG/ML
INJECTION, SOLUTION EPIDURAL; INTRACAUDAL
Status: DISPENSED
Start: 2024-06-10

## (undated) RX ORDER — PROPOFOL 10 MG/ML
INJECTION, EMULSION INTRAVENOUS
Status: DISPENSED
Start: 2024-06-10

## (undated) RX ORDER — HYDROXYZINE HYDROCHLORIDE 10 MG/1
TABLET, FILM COATED ORAL
Status: DISPENSED
Start: 2025-09-02

## (undated) RX ORDER — FENTANYL CITRATE 50 UG/ML
INJECTION, SOLUTION INTRAMUSCULAR; INTRAVENOUS
Status: DISPENSED
Start: 2025-09-02

## (undated) RX ORDER — ACETAMINOPHEN 325 MG/1
TABLET ORAL
Status: DISPENSED
Start: 2025-09-02

## (undated) RX ORDER — DEXAMETHASONE SODIUM PHOSPHATE 4 MG/ML
INJECTION, SOLUTION INTRA-ARTICULAR; INTRALESIONAL; INTRAMUSCULAR; INTRAVENOUS; SOFT TISSUE
Status: DISPENSED
Start: 2024-06-10

## (undated) RX ORDER — METHOCARBAMOL 500 MG/1
TABLET, FILM COATED ORAL
Status: DISPENSED
Start: 2025-09-02

## (undated) RX ORDER — FENTANYL CITRATE 50 UG/ML
INJECTION, SOLUTION INTRAMUSCULAR; INTRAVENOUS
Status: DISPENSED
Start: 2024-06-10

## (undated) RX ORDER — TRANEXAMIC ACID 650 MG/1
TABLET ORAL
Status: DISPENSED
Start: 2024-06-10

## (undated) RX ORDER — FENTANYL CITRATE-0.9 % NACL/PF 10 MCG/ML
PLASTIC BAG, INJECTION (ML) INTRAVENOUS
Status: DISPENSED
Start: 2025-09-02

## (undated) RX ORDER — LIDOCAINE HYDROCHLORIDE 10 MG/ML
INJECTION, SOLUTION EPIDURAL; INFILTRATION; INTRACAUDAL; PERINEURAL
Status: DISPENSED
Start: 2024-06-10

## (undated) RX ORDER — HYDROMORPHONE HCL IN WATER/PF 6 MG/30 ML
PATIENT CONTROLLED ANALGESIA SYRINGE INTRAVENOUS
Status: DISPENSED
Start: 2025-09-02

## (undated) RX ORDER — LIDOCAINE HYDROCHLORIDE 10 MG/ML
INJECTION, SOLUTION EPIDURAL; INFILTRATION; INTRACAUDAL; PERINEURAL
Status: DISPENSED
Start: 2025-09-02

## (undated) RX ORDER — PROPOFOL 10 MG/ML
INJECTION, EMULSION INTRAVENOUS
Status: DISPENSED
Start: 2025-09-02

## (undated) RX ORDER — ONDANSETRON 2 MG/ML
INJECTION INTRAMUSCULAR; INTRAVENOUS
Status: DISPENSED
Start: 2024-06-10

## (undated) RX ORDER — BUPIVACAINE HYDROCHLORIDE AND EPINEPHRINE 2.5; 5 MG/ML; UG/ML
INJECTION, SOLUTION EPIDURAL; INFILTRATION; INTRACAUDAL; PERINEURAL
Status: DISPENSED
Start: 2025-09-02

## (undated) RX ORDER — ONDANSETRON 2 MG/ML
INJECTION INTRAMUSCULAR; INTRAVENOUS
Status: DISPENSED
Start: 2025-09-02

## (undated) RX ORDER — CEFAZOLIN SODIUM/WATER 2 G/20 ML
SYRINGE (ML) INTRAVENOUS
Status: DISPENSED
Start: 2024-06-10

## (undated) RX ORDER — ACETAMINOPHEN 325 MG/1
TABLET ORAL
Status: DISPENSED
Start: 2024-06-10